# Patient Record
Sex: FEMALE | Race: WHITE | Employment: OTHER | URBAN - METROPOLITAN AREA
[De-identification: names, ages, dates, MRNs, and addresses within clinical notes are randomized per-mention and may not be internally consistent; named-entity substitution may affect disease eponyms.]

---

## 2017-02-21 ENCOUNTER — TELEPHONE (OUTPATIENT)
Dept: ORTHOPEDIC SURGERY | Age: 72
End: 2017-02-21

## 2017-04-11 ENCOUNTER — OFFICE VISIT (OUTPATIENT)
Dept: ORTHOPEDIC SURGERY | Age: 72
End: 2017-04-11

## 2017-04-11 VITALS — HEIGHT: 66 IN | BODY MASS INDEX: 41.77 KG/M2 | WEIGHT: 259.92 LBS

## 2017-04-11 DIAGNOSIS — M43.06 SPONDYLOLYSIS OF LUMBAR REGION: ICD-10-CM

## 2017-04-11 DIAGNOSIS — M25.562 CHRONIC PAIN OF BOTH KNEES: ICD-10-CM

## 2017-04-11 DIAGNOSIS — G89.29 CHRONIC PAIN OF BOTH KNEES: ICD-10-CM

## 2017-04-11 DIAGNOSIS — M22.42 CHONDROMALACIA OF BOTH PATELLAE: ICD-10-CM

## 2017-04-11 DIAGNOSIS — M17.0 PRIMARY OSTEOARTHRITIS OF BOTH KNEES: Primary | ICD-10-CM

## 2017-04-11 DIAGNOSIS — E11.42 DIABETIC POLYNEUROPATHY ASSOCIATED WITH TYPE 2 DIABETES MELLITUS (HCC): ICD-10-CM

## 2017-04-11 DIAGNOSIS — M25.561 CHRONIC PAIN OF BOTH KNEES: ICD-10-CM

## 2017-04-11 DIAGNOSIS — M22.41 CHONDROMALACIA OF BOTH PATELLAE: ICD-10-CM

## 2017-04-11 DIAGNOSIS — G89.29 CHRONIC BILATERAL LOW BACK PAIN WITHOUT SCIATICA: ICD-10-CM

## 2017-04-11 DIAGNOSIS — M54.50 CHRONIC BILATERAL LOW BACK PAIN WITHOUT SCIATICA: ICD-10-CM

## 2017-04-11 PROCEDURE — 99213 OFFICE O/P EST LOW 20 MIN: CPT | Performed by: FAMILY MEDICINE

## 2017-04-11 RX ORDER — METHYLPREDNISOLONE 4 MG/1
TABLET ORAL
Qty: 21 KIT | Refills: 0 | Status: SHIPPED | OUTPATIENT
Start: 2017-04-11 | End: 2019-08-01

## 2018-06-20 ENCOUNTER — ORTHOTIC/BRACE ENCOUNTER (OUTPATIENT)
Dept: ORTHOPEDIC SURGERY | Age: 73
End: 2018-06-20

## 2018-10-04 ENCOUNTER — OFFICE VISIT (OUTPATIENT)
Dept: ORTHOPEDIC SURGERY | Age: 73
End: 2018-10-04
Payer: MEDICARE

## 2018-10-04 ENCOUNTER — TELEPHONE (OUTPATIENT)
Dept: ORTHOPEDIC SURGERY | Age: 73
End: 2018-10-04

## 2018-10-04 VITALS — HEIGHT: 67 IN | BODY MASS INDEX: 39.24 KG/M2 | WEIGHT: 250 LBS

## 2018-10-04 DIAGNOSIS — M17.11 PRIMARY OSTEOARTHRITIS OF RIGHT KNEE: ICD-10-CM

## 2018-10-04 DIAGNOSIS — M25.561 CHRONIC PAIN OF RIGHT KNEE: Primary | ICD-10-CM

## 2018-10-04 DIAGNOSIS — M22.41 CHONDROMALACIA OF RIGHT PATELLA: ICD-10-CM

## 2018-10-04 DIAGNOSIS — G89.29 CHRONIC PAIN OF RIGHT KNEE: Primary | ICD-10-CM

## 2018-10-04 PROCEDURE — 4040F PNEUMOC VAC/ADMIN/RCVD: CPT | Performed by: FAMILY MEDICINE

## 2018-10-04 PROCEDURE — G8400 PT W/DXA NO RESULTS DOC: HCPCS | Performed by: FAMILY MEDICINE

## 2018-10-04 PROCEDURE — G8484 FLU IMMUNIZE NO ADMIN: HCPCS | Performed by: FAMILY MEDICINE

## 2018-10-04 PROCEDURE — 1101F PT FALLS ASSESS-DOCD LE1/YR: CPT | Performed by: FAMILY MEDICINE

## 2018-10-04 PROCEDURE — 1123F ACP DISCUSS/DSCN MKR DOCD: CPT | Performed by: FAMILY MEDICINE

## 2018-10-04 PROCEDURE — G8417 CALC BMI ABV UP PARAM F/U: HCPCS | Performed by: FAMILY MEDICINE

## 2018-10-04 PROCEDURE — G8427 DOCREV CUR MEDS BY ELIG CLIN: HCPCS | Performed by: FAMILY MEDICINE

## 2018-10-04 PROCEDURE — 3017F COLORECTAL CA SCREEN DOC REV: CPT | Performed by: FAMILY MEDICINE

## 2018-10-04 PROCEDURE — 1036F TOBACCO NON-USER: CPT | Performed by: FAMILY MEDICINE

## 2018-10-04 PROCEDURE — 99214 OFFICE O/P EST MOD 30 MIN: CPT | Performed by: FAMILY MEDICINE

## 2018-10-04 PROCEDURE — 1090F PRES/ABSN URINE INCON ASSESS: CPT | Performed by: FAMILY MEDICINE

## 2018-10-04 PROCEDURE — 20610 DRAIN/INJ JOINT/BURSA W/O US: CPT | Performed by: FAMILY MEDICINE

## 2018-10-04 NOTE — PROGRESS NOTES
symptoms have only improved minimally. She continues to have difficulty with squatting and getting up from a kneeling position. She is unable to exercise and reports no substantial pain with routine walking. It is simply with squatting and kneeling. She has been utilizing her topical diclofenac gel. Her sugars are under excellent control. She has had to modify some of her home exercises for VMO strengthening and hamstring flexibility. She is not really complaining of locking or catching. She was seen last in the office on 4/7/2016 for follow-up on her right knee severe chondromalacia patella with possible occult meniscus. She was continuing to have some difficulties however she could not afford an MRI or Visco supplementation and she states that her right knee symptoms are doing reasonably well with home exercises and use of her diclofenac gel. Her new complaint today is of ongoing pain to the anterior and lesser degree lateral portion of her left knee. She states that on 7/27/2016 she was sleeping and night and developed a charley horse and felt pain to the anterior lateral portion of her left knee. She was able to get back to sleep and the following day she awoke with limited ability to bear weight and swelling in her knee. She was having pain in the range of 7-8 out of 10 primarily anteriorly but also somewhat laterally. There is no history of trauma or fall. She did treat herself with icing consistently as well as her topical diclofenac. Her symptoms have improved substantially at least 80% but she does have ongoing pain with any attempted squatting and prolonged standing and climbing stairs. She is not complaining of active locking or catching. She is seen today for orthopedic and sports consultation. She was seen in the office on 8/4/2016 primarily for her left knee which was bothering her. She did receive a steroid injection states her left knee symptoms are doing very well.   Denies locking catching swelling or instability. Right knee was evaluated earlier this year and is known to have severe chondromalacia patella and concerns were raised earlier this year for a possible occult meniscus. Her symptoms did improve with conservative treatment. She states that on 10/10/2016 she was going down some stairs carrying laundry when she twisted awkwardly while wearing socks and sustained a weightbearing rotational injury involving her right knee. She is uncertain as to whether operative is a pop or crack of the she did have immediate pain. This is followed by swelling. Since that time she's had increasing pain about the anterior medial portion of her knee. She feels very stiff and is having achy pain at 4-5 out of 10 with sharper 8-9 out of 10 pain with attempted weightbearing and pivoting. Positional changes and getting up from a seated position or painful. She does have a history of renal disease and was advised not to take anti-inflammatory's. She has been taking Tylenol in her sugars are well controlled. She is also on Neurontin. She is having difficulty with sleeping. Due to her recurrent injury she is seen today for orthopedic and sports consultation with imaging. She has not had MRI imaging of his right knee and we had tried to get an MRI earlier this year but she could not afford it. She was last seen in the office on 10/13/2016 and was sent for an MRI of her knee given the ongoing pain to her right greater than left knee. Her MRI was obtained and did show evidence of grade 4 chondromalacia patella most prominent to the lateral patellofemoral facet. There was chronic tendinopathy to the quad tendon. As for now there was moderate chondral thinning to the medial compartment there is no evidence of displaced meniscus tear or ligamentous injury.   She continues to have pain with stiffness in motion loss and has been attempting to stretch and perform her home based exercises she

## 2018-10-05 ENCOUNTER — TELEPHONE (OUTPATIENT)
Dept: ORTHOPEDIC SURGERY | Age: 73
End: 2018-10-05

## 2018-10-11 ENCOUNTER — OFFICE VISIT (OUTPATIENT)
Dept: ORTHOPEDIC SURGERY | Age: 73
End: 2018-10-11
Payer: MEDICARE

## 2018-10-11 VITALS
WEIGHT: 250 LBS | BODY MASS INDEX: 39.24 KG/M2 | SYSTOLIC BLOOD PRESSURE: 121 MMHG | HEIGHT: 67 IN | DIASTOLIC BLOOD PRESSURE: 66 MMHG | HEART RATE: 80 BPM

## 2018-10-11 DIAGNOSIS — M25.561 CHRONIC PAIN OF RIGHT KNEE: ICD-10-CM

## 2018-10-11 DIAGNOSIS — M22.41 CHONDROMALACIA OF RIGHT PATELLA: ICD-10-CM

## 2018-10-11 DIAGNOSIS — G89.29 CHRONIC PAIN OF RIGHT KNEE: ICD-10-CM

## 2018-10-11 DIAGNOSIS — M17.11 PRIMARY OSTEOARTHRITIS OF RIGHT KNEE: Primary | ICD-10-CM

## 2018-10-11 PROCEDURE — G8417 CALC BMI ABV UP PARAM F/U: HCPCS | Performed by: FAMILY MEDICINE

## 2018-10-11 PROCEDURE — G8400 PT W/DXA NO RESULTS DOC: HCPCS | Performed by: FAMILY MEDICINE

## 2018-10-11 PROCEDURE — G8427 DOCREV CUR MEDS BY ELIG CLIN: HCPCS | Performed by: FAMILY MEDICINE

## 2018-10-11 PROCEDURE — 99213 OFFICE O/P EST LOW 20 MIN: CPT | Performed by: FAMILY MEDICINE

## 2018-10-11 PROCEDURE — G8484 FLU IMMUNIZE NO ADMIN: HCPCS | Performed by: FAMILY MEDICINE

## 2018-10-11 PROCEDURE — 4040F PNEUMOC VAC/ADMIN/RCVD: CPT | Performed by: FAMILY MEDICINE

## 2018-10-11 PROCEDURE — 1123F ACP DISCUSS/DSCN MKR DOCD: CPT | Performed by: FAMILY MEDICINE

## 2018-10-11 PROCEDURE — 3017F COLORECTAL CA SCREEN DOC REV: CPT | Performed by: FAMILY MEDICINE

## 2018-10-11 PROCEDURE — 1101F PT FALLS ASSESS-DOCD LE1/YR: CPT | Performed by: FAMILY MEDICINE

## 2018-10-11 PROCEDURE — 1090F PRES/ABSN URINE INCON ASSESS: CPT | Performed by: FAMILY MEDICINE

## 2018-10-11 PROCEDURE — 1036F TOBACCO NON-USER: CPT | Performed by: FAMILY MEDICINE

## 2018-10-11 PROCEDURE — 20610 DRAIN/INJ JOINT/BURSA W/O US: CPT | Performed by: FAMILY MEDICINE

## 2018-10-11 NOTE — PROGRESS NOTES
[address] [facility] 15mg/ML  [address]: [id number]  LOT #: [id number]  EXP: 2/29/20  LOCATION: Right Knee

## 2018-10-11 NOTE — PROGRESS NOTES
Chief Complaint  Knee Pain (Orthovisc #1 Right Knee)      Follow-up bilateral knee pain with known history of bilateral knee osteoarthritis and advanced chondromalacia status post completion of Visco supplementation 12/8/2016 with recurrent pain status post recent lumbar fusion 9/12/2018 by Dr. Susan Hernandez    History of Present Illness:  Yue Prakash is a 72 y.o. female is a very pleasant white female very nice patient of  Dr. Yennifer Calderon who is a reasonably well-controlled diabetic with her last hemoglobin A1c at 6.5 who has a history of renal disease and neuropathy as well who is being seen today in kind consultation from Dr. Yennifer Calderon for evaluation of a new orthopedic condition. Apparently several weeks ago she was kneeling down on her hands and knees to dust under an end table when she began to notice a very sharp pain to the retropatellar portion of her right knee. It is no definite history of injury or new activity. She is having pain in the range of 5-6 out of 10 really with only squatting and kneeling. When she is up and walking and exercising her knee is not causing her great difficulty although she has noticed patellofemoral crepitation. She's had some mild swelling. She cannot be on oral anti-inflammatories due to her history of renal disease. She was previously given topical diclofenac which she has been using. She is not really complaining of locking catching or instability. She does have very occasional pseudo-buckling but once again the majority of her pain is with squatting and kneeling. She reports little in the way of rest or night pain. She is seen today for orthopedic and sports consultation. She was seen in the office on 3/10/2016 and was started on conservative treatment for suspected right knee aggravation chondromalacia patella with mild underlying osteoarthritis with possible occult nonmechanical meniscus.   She presents back today stating that her symptoms have only swelling or instability. Right knee was evaluated earlier this year and is known to have severe chondromalacia patella and concerns were raised earlier this year for a possible occult meniscus. Her symptoms did improve with conservative treatment. She states that on 10/10/2016 she was going down some stairs carrying laundry when she twisted awkwardly while wearing socks and sustained a weightbearing rotational injury involving her right knee. She is uncertain as to whether operative is a pop or crack of the she did have immediate pain. This is followed by swelling. Since that time she's had increasing pain about the anterior medial portion of her knee. She feels very stiff and is having achy pain at 4-5 out of 10 with sharper 8-9 out of 10 pain with attempted weightbearing and pivoting. Positional changes and getting up from a seated position or painful. She does have a history of renal disease and was advised not to take anti-inflammatory's. She has been taking Tylenol in her sugars are well controlled. She is also on Neurontin. She is having difficulty with sleeping. Due to her recurrent injury she is seen today for orthopedic and sports consultation with imaging. She has not had MRI imaging of his right knee and we had tried to get an MRI earlier this year but she could not afford it. She was last seen in the office on 10/13/2016 and was sent for an MRI of her knee given the ongoing pain to her right greater than left knee. Her MRI was obtained and did show evidence of grade 4 chondromalacia patella most prominent to the lateral patellofemoral facet. There was chronic tendinopathy to the quad tendon. As for now there was moderate chondral thinning to the medial compartment there is no evidence of displaced meniscus tear or ligamentous injury.   She continues to have pain with stiffness in motion loss and has been attempting to stretch and perform her home based exercises she has been using her

## 2018-10-18 ENCOUNTER — OFFICE VISIT (OUTPATIENT)
Dept: ORTHOPEDIC SURGERY | Age: 73
End: 2018-10-18
Payer: MEDICARE

## 2018-10-18 VITALS
HEART RATE: 78 BPM | BODY MASS INDEX: 39.24 KG/M2 | WEIGHT: 250 LBS | DIASTOLIC BLOOD PRESSURE: 67 MMHG | SYSTOLIC BLOOD PRESSURE: 122 MMHG | HEIGHT: 67 IN

## 2018-10-18 DIAGNOSIS — M17.11 PRIMARY OSTEOARTHRITIS OF RIGHT KNEE: Primary | ICD-10-CM

## 2018-10-18 DIAGNOSIS — G89.29 CHRONIC PAIN OF RIGHT KNEE: ICD-10-CM

## 2018-10-18 DIAGNOSIS — M25.561 CHRONIC PAIN OF RIGHT KNEE: ICD-10-CM

## 2018-10-18 DIAGNOSIS — M22.41 CHONDROMALACIA OF RIGHT PATELLA: ICD-10-CM

## 2018-10-18 PROCEDURE — 20610 DRAIN/INJ JOINT/BURSA W/O US: CPT | Performed by: FAMILY MEDICINE

## 2018-10-18 PROCEDURE — 99999 PR OFFICE/OUTPT VISIT,PROCEDURE ONLY: CPT | Performed by: FAMILY MEDICINE

## 2018-10-25 ENCOUNTER — OFFICE VISIT (OUTPATIENT)
Dept: ORTHOPEDIC SURGERY | Age: 73
End: 2018-10-25
Payer: MEDICARE

## 2018-10-25 VITALS
HEART RATE: 76 BPM | WEIGHT: 250 LBS | HEIGHT: 67 IN | BODY MASS INDEX: 39.24 KG/M2 | DIASTOLIC BLOOD PRESSURE: 66 MMHG | SYSTOLIC BLOOD PRESSURE: 124 MMHG

## 2018-10-25 DIAGNOSIS — M22.41 CHONDROMALACIA OF RIGHT PATELLA: ICD-10-CM

## 2018-10-25 DIAGNOSIS — G89.29 CHRONIC PAIN OF RIGHT KNEE: ICD-10-CM

## 2018-10-25 DIAGNOSIS — M17.11 PRIMARY OSTEOARTHRITIS OF RIGHT KNEE: Primary | ICD-10-CM

## 2018-10-25 DIAGNOSIS — M25.561 CHRONIC PAIN OF RIGHT KNEE: ICD-10-CM

## 2018-10-25 PROCEDURE — 99999 PR OFFICE/OUTPT VISIT,PROCEDURE ONLY: CPT | Performed by: FAMILY MEDICINE

## 2018-10-25 PROCEDURE — 20610 DRAIN/INJ JOINT/BURSA W/O US: CPT | Performed by: FAMILY MEDICINE

## 2019-02-28 DIAGNOSIS — M25.561 CHRONIC PAIN OF RIGHT KNEE: ICD-10-CM

## 2019-02-28 DIAGNOSIS — M22.41 CHONDROMALACIA OF RIGHT PATELLA: ICD-10-CM

## 2019-02-28 DIAGNOSIS — M17.11 PRIMARY OSTEOARTHRITIS OF RIGHT KNEE: Primary | ICD-10-CM

## 2019-02-28 DIAGNOSIS — G89.29 CHRONIC PAIN OF RIGHT KNEE: ICD-10-CM

## 2019-06-21 DIAGNOSIS — M25.561 CHRONIC PAIN OF RIGHT KNEE: ICD-10-CM

## 2019-06-21 DIAGNOSIS — G89.29 CHRONIC PAIN OF RIGHT KNEE: ICD-10-CM

## 2019-06-21 DIAGNOSIS — M17.11 PRIMARY OSTEOARTHRITIS OF RIGHT KNEE: ICD-10-CM

## 2019-06-21 DIAGNOSIS — M22.41 CHONDROMALACIA OF RIGHT PATELLA: ICD-10-CM

## 2019-08-01 ENCOUNTER — APPOINTMENT (OUTPATIENT)
Dept: GENERAL RADIOLOGY | Age: 74
DRG: 871 | End: 2019-08-01
Payer: MEDICARE

## 2019-08-01 ENCOUNTER — HOSPITAL ENCOUNTER (INPATIENT)
Age: 74
LOS: 3 days | Discharge: HOME | DRG: 871 | End: 2019-08-04
Attending: EMERGENCY MEDICINE | Admitting: INTERNAL MEDICINE
Payer: MEDICARE

## 2019-08-01 DIAGNOSIS — N39.0 URINARY TRACT INFECTION WITHOUT HEMATURIA, SITE UNSPECIFIED: ICD-10-CM

## 2019-08-01 DIAGNOSIS — R65.20 SEVERE SEPSIS (HCC): Primary | ICD-10-CM

## 2019-08-01 DIAGNOSIS — A41.9 SEVERE SEPSIS (HCC): Primary | ICD-10-CM

## 2019-08-01 DIAGNOSIS — N17.9 AKI (ACUTE KIDNEY INJURY) (HCC): ICD-10-CM

## 2019-08-01 DIAGNOSIS — M17.0 PRIMARY OSTEOARTHRITIS OF BOTH KNEES: ICD-10-CM

## 2019-08-01 PROBLEM — R65.21 SEPTIC SHOCK (HCC): Status: ACTIVE | Noted: 2019-08-01

## 2019-08-01 PROBLEM — R57.9 SHOCK (HCC): Status: ACTIVE | Noted: 2019-08-01

## 2019-08-01 LAB
ANION GAP SERPL CALCULATED.3IONS-SCNC: 12 MMOL/L (ref 3–16)
ANION GAP SERPL CALCULATED.3IONS-SCNC: 16 MMOL/L (ref 3–16)
BACTERIA: ABNORMAL /HPF
BASOPHILS ABSOLUTE: 0 K/UL (ref 0–0.2)
BASOPHILS ABSOLUTE: 0 K/UL (ref 0–0.2)
BASOPHILS RELATIVE PERCENT: 0.2 %
BASOPHILS RELATIVE PERCENT: 0.2 %
BILIRUBIN URINE: NEGATIVE
BLOOD, URINE: ABNORMAL
BUN BLDV-MCNC: 60 MG/DL (ref 7–20)
BUN BLDV-MCNC: 64 MG/DL (ref 7–20)
CALCIUM SERPL-MCNC: 8.8 MG/DL (ref 8.3–10.6)
CALCIUM SERPL-MCNC: 9.7 MG/DL (ref 8.3–10.6)
CHLORIDE BLD-SCNC: 104 MMOL/L (ref 99–110)
CHLORIDE BLD-SCNC: 105 MMOL/L (ref 99–110)
CLARITY: ABNORMAL
CO2: 17 MMOL/L (ref 21–32)
CO2: 18 MMOL/L (ref 21–32)
COLOR: YELLOW
CREAT SERPL-MCNC: 3.4 MG/DL (ref 0.6–1.2)
CREAT SERPL-MCNC: 3.8 MG/DL (ref 0.6–1.2)
EKG ATRIAL RATE: 107 BPM
EKG DIAGNOSIS: NORMAL
EKG P AXIS: 60 DEGREES
EKG P-R INTERVAL: 144 MS
EKG Q-T INTERVAL: 326 MS
EKG QRS DURATION: 74 MS
EKG QTC CALCULATION (BAZETT): 435 MS
EKG R AXIS: -5 DEGREES
EKG T AXIS: 34 DEGREES
EKG VENTRICULAR RATE: 107 BPM
EOSINOPHILS ABSOLUTE: 0 K/UL (ref 0–0.6)
EOSINOPHILS ABSOLUTE: 0 K/UL (ref 0–0.6)
EOSINOPHILS RELATIVE PERCENT: 0.1 %
EOSINOPHILS RELATIVE PERCENT: 0.1 %
EPITHELIAL CELLS, UA: 0 /HPF (ref 0–5)
GFR AFRICAN AMERICAN: 14
GFR AFRICAN AMERICAN: 16
GFR NON-AFRICAN AMERICAN: 12
GFR NON-AFRICAN AMERICAN: 13
GLUCOSE BLD-MCNC: 124 MG/DL (ref 70–99)
GLUCOSE BLD-MCNC: 156 MG/DL (ref 70–99)
GLUCOSE BLD-MCNC: 254 MG/DL (ref 70–99)
GLUCOSE URINE: NEGATIVE MG/DL
HCT VFR BLD CALC: 27.9 % (ref 36–48)
HCT VFR BLD CALC: 33.4 % (ref 36–48)
HEMOGLOBIN: 11.2 G/DL (ref 12–16)
HEMOGLOBIN: 9.3 G/DL (ref 12–16)
HYALINE CASTS: 1 /LPF (ref 0–8)
KETONES, URINE: NEGATIVE MG/DL
LACTIC ACID, SEPSIS: 0.8 MMOL/L (ref 0.4–1.9)
LACTIC ACID, SEPSIS: 1.3 MMOL/L (ref 0.4–1.9)
LEUKOCYTE ESTERASE, URINE: ABNORMAL
LYMPHOCYTES ABSOLUTE: 0.3 K/UL (ref 1–5.1)
LYMPHOCYTES ABSOLUTE: 1.2 K/UL (ref 1–5.1)
LYMPHOCYTES RELATIVE PERCENT: 12.7 %
LYMPHOCYTES RELATIVE PERCENT: 3.9 %
MAGNESIUM: 2.2 MG/DL (ref 1.8–2.4)
MCH RBC QN AUTO: 31.3 PG (ref 26–34)
MCH RBC QN AUTO: 31.3 PG (ref 26–34)
MCHC RBC AUTO-ENTMCNC: 33.3 G/DL (ref 31–36)
MCHC RBC AUTO-ENTMCNC: 33.6 G/DL (ref 31–36)
MCV RBC AUTO: 93.1 FL (ref 80–100)
MCV RBC AUTO: 94 FL (ref 80–100)
MICROSCOPIC EXAMINATION: YES
MONOCYTES ABSOLUTE: 0.5 K/UL (ref 0–1.3)
MONOCYTES ABSOLUTE: 0.9 K/UL (ref 0–1.3)
MONOCYTES RELATIVE PERCENT: 6.2 %
MONOCYTES RELATIVE PERCENT: 9.5 %
NEUTROPHILS ABSOLUTE: 7.2 K/UL (ref 1.7–7.7)
NEUTROPHILS ABSOLUTE: 7.3 K/UL (ref 1.7–7.7)
NEUTROPHILS RELATIVE PERCENT: 77.5 %
NEUTROPHILS RELATIVE PERCENT: 89.6 %
NITRITE, URINE: NEGATIVE
PDW BLD-RTO: 16.6 % (ref 12.4–15.4)
PDW BLD-RTO: 16.8 % (ref 12.4–15.4)
PERFORMED ON: ABNORMAL
PH UA: 5 (ref 5–8)
PHOSPHORUS: 3.9 MG/DL (ref 2.5–4.9)
PLATELET # BLD: 196 K/UL (ref 135–450)
PLATELET # BLD: 214 K/UL (ref 135–450)
PMV BLD AUTO: 8.8 FL (ref 5–10.5)
PMV BLD AUTO: 8.8 FL (ref 5–10.5)
POTASSIUM REFLEX MAGNESIUM: 5.1 MMOL/L (ref 3.5–5.1)
POTASSIUM SERPL-SCNC: 5 MMOL/L (ref 3.5–5.1)
PRO-BNP: 819 PG/ML (ref 0–124)
PROTEIN UA: 30 MG/DL
RBC # BLD: 2.97 M/UL (ref 4–5.2)
RBC # BLD: 3.59 M/UL (ref 4–5.2)
RBC UA: 1 /HPF (ref 0–4)
SODIUM BLD-SCNC: 135 MMOL/L (ref 136–145)
SODIUM BLD-SCNC: 137 MMOL/L (ref 136–145)
SPECIFIC GRAVITY UA: 1.02 (ref 1–1.03)
TROPONIN: 0.03 NG/ML
URINE REFLEX TO CULTURE: YES
URINE TYPE: ABNORMAL
UROBILINOGEN, URINE: 0.2 E.U./DL
WBC # BLD: 8 K/UL (ref 4–11)
WBC # BLD: 9.5 K/UL (ref 4–11)
WBC UA: 37 /HPF (ref 0–5)

## 2019-08-01 PROCEDURE — 99291 CRITICAL CARE FIRST HOUR: CPT | Performed by: INTERNAL MEDICINE

## 2019-08-01 PROCEDURE — 71045 X-RAY EXAM CHEST 1 VIEW: CPT

## 2019-08-01 PROCEDURE — 85025 COMPLETE CBC W/AUTO DIFF WBC: CPT

## 2019-08-01 PROCEDURE — 94761 N-INVAS EAR/PLS OXIMETRY MLT: CPT

## 2019-08-01 PROCEDURE — 2580000003 HC RX 258: Performed by: NURSE PRACTITIONER

## 2019-08-01 PROCEDURE — 93010 ELECTROCARDIOGRAM REPORT: CPT | Performed by: INTERNAL MEDICINE

## 2019-08-01 PROCEDURE — 83735 ASSAY OF MAGNESIUM: CPT

## 2019-08-01 PROCEDURE — 87641 MR-STAPH DNA AMP PROBE: CPT

## 2019-08-01 PROCEDURE — 2500000003 HC RX 250 WO HCPCS: Performed by: INTERNAL MEDICINE

## 2019-08-01 PROCEDURE — 84484 ASSAY OF TROPONIN QUANT: CPT

## 2019-08-01 PROCEDURE — 6370000000 HC RX 637 (ALT 250 FOR IP): Performed by: INTERNAL MEDICINE

## 2019-08-01 PROCEDURE — 2580000003 HC RX 258: Performed by: EMERGENCY MEDICINE

## 2019-08-01 PROCEDURE — 96365 THER/PROPH/DIAG IV INF INIT: CPT

## 2019-08-01 PROCEDURE — 96361 HYDRATE IV INFUSION ADD-ON: CPT

## 2019-08-01 PROCEDURE — 83880 ASSAY OF NATRIURETIC PEPTIDE: CPT

## 2019-08-01 PROCEDURE — 84100 ASSAY OF PHOSPHORUS: CPT

## 2019-08-01 PROCEDURE — 71046 X-RAY EXAM CHEST 2 VIEWS: CPT

## 2019-08-01 PROCEDURE — 4500000026 HC ED CRITICAL CARE PROCEDURE

## 2019-08-01 PROCEDURE — 2000000000 HC ICU R&B

## 2019-08-01 PROCEDURE — 6370000000 HC RX 637 (ALT 250 FOR IP): Performed by: EMERGENCY MEDICINE

## 2019-08-01 PROCEDURE — 83605 ASSAY OF LACTIC ACID: CPT

## 2019-08-01 PROCEDURE — 51702 INSERT TEMP BLADDER CATH: CPT | Performed by: NURSE PRACTITIONER

## 2019-08-01 PROCEDURE — 51702 INSERT TEMP BLADDER CATH: CPT

## 2019-08-01 PROCEDURE — 81001 URINALYSIS AUTO W/SCOPE: CPT

## 2019-08-01 PROCEDURE — 96367 TX/PROPH/DG ADDL SEQ IV INF: CPT

## 2019-08-01 PROCEDURE — 87086 URINE CULTURE/COLONY COUNT: CPT

## 2019-08-01 PROCEDURE — 99291 CRITICAL CARE FIRST HOUR: CPT

## 2019-08-01 PROCEDURE — 80048 BASIC METABOLIC PNL TOTAL CA: CPT

## 2019-08-01 PROCEDURE — 2580000003 HC RX 258: Performed by: INTERNAL MEDICINE

## 2019-08-01 PROCEDURE — 93005 ELECTROCARDIOGRAM TRACING: CPT | Performed by: EMERGENCY MEDICINE

## 2019-08-01 PROCEDURE — 87040 BLOOD CULTURE FOR BACTERIA: CPT

## 2019-08-01 PROCEDURE — 36556 INSERT NON-TUNNEL CV CATH: CPT | Performed by: NURSE PRACTITIONER

## 2019-08-01 PROCEDURE — 6360000002 HC RX W HCPCS: Performed by: NURSE PRACTITIONER

## 2019-08-01 PROCEDURE — 6360000002 HC RX W HCPCS: Performed by: EMERGENCY MEDICINE

## 2019-08-01 RX ORDER — SODIUM CHLORIDE 9 MG/ML
INJECTION, SOLUTION INTRAVENOUS CONTINUOUS
Status: DISCONTINUED | OUTPATIENT
Start: 2019-08-01 | End: 2019-08-03

## 2019-08-01 RX ORDER — ACETAMINOPHEN 325 MG/1
650 TABLET ORAL ONCE
Status: COMPLETED | OUTPATIENT
Start: 2019-08-01 | End: 2019-08-01

## 2019-08-01 RX ORDER — HEPARIN SODIUM 5000 [USP'U]/ML
5000 INJECTION, SOLUTION INTRAVENOUS; SUBCUTANEOUS EVERY 8 HOURS SCHEDULED
Status: DISCONTINUED | OUTPATIENT
Start: 2019-08-01 | End: 2019-08-04 | Stop reason: HOSPADM

## 2019-08-01 RX ORDER — 0.9 % SODIUM CHLORIDE 0.9 %
30 INTRAVENOUS SOLUTION INTRAVENOUS ONCE
Status: COMPLETED | OUTPATIENT
Start: 2019-08-01 | End: 2019-08-01

## 2019-08-01 RX ORDER — GABAPENTIN 300 MG/1
600 CAPSULE ORAL NIGHTLY
Status: DISCONTINUED | OUTPATIENT
Start: 2019-08-01 | End: 2019-08-01

## 2019-08-01 RX ORDER — INSULIN ASPART 100 [IU]/ML
12 INJECTION, SOLUTION INTRAVENOUS; SUBCUTANEOUS
COMMUNITY
Start: 2019-05-27

## 2019-08-01 RX ORDER — SODIUM CHLORIDE 0.9 % (FLUSH) 0.9 %
10 SYRINGE (ML) INJECTION PRN
Status: DISCONTINUED | OUTPATIENT
Start: 2019-08-01 | End: 2019-08-04 | Stop reason: HOSPADM

## 2019-08-01 RX ORDER — GABAPENTIN 100 MG/1
200 CAPSULE ORAL NIGHTLY
Status: DISCONTINUED | OUTPATIENT
Start: 2019-08-01 | End: 2019-08-04 | Stop reason: HOSPADM

## 2019-08-01 RX ORDER — SODIUM CHLORIDE 0.9 % (FLUSH) 0.9 %
10 SYRINGE (ML) INJECTION EVERY 12 HOURS SCHEDULED
Status: DISCONTINUED | OUTPATIENT
Start: 2019-08-01 | End: 2019-08-04 | Stop reason: HOSPADM

## 2019-08-01 RX ORDER — INSULIN GLARGINE 100 [IU]/ML
30 INJECTION, SOLUTION SUBCUTANEOUS 2 TIMES DAILY
Status: DISCONTINUED | OUTPATIENT
Start: 2019-08-01 | End: 2019-08-04 | Stop reason: HOSPADM

## 2019-08-01 RX ORDER — LISINOPRIL 5 MG/1
5 TABLET ORAL DAILY
Status: DISCONTINUED | OUTPATIENT
Start: 2019-08-01 | End: 2019-08-01

## 2019-08-01 RX ORDER — ATORVASTATIN CALCIUM 40 MG/1
40 TABLET, FILM COATED ORAL DAILY
COMMUNITY
Start: 2019-07-09 | End: 2020-07-21

## 2019-08-01 RX ORDER — 0.9 % SODIUM CHLORIDE 0.9 %
1000 INTRAVENOUS SOLUTION INTRAVENOUS ONCE
Status: COMPLETED | OUTPATIENT
Start: 2019-08-01 | End: 2019-08-01

## 2019-08-01 RX ADMIN — CEFEPIME HYDROCHLORIDE 2 G: 2 INJECTION, POWDER, FOR SOLUTION INTRAVENOUS at 13:04

## 2019-08-01 RX ADMIN — VANCOMYCIN HYDROCHLORIDE 1250 MG: 10 INJECTION, POWDER, LYOPHILIZED, FOR SOLUTION INTRAVENOUS at 17:46

## 2019-08-01 RX ADMIN — ACETAMINOPHEN 650 MG: 325 TABLET ORAL at 12:40

## 2019-08-01 RX ADMIN — SODIUM CHLORIDE 3501 ML: 9 INJECTION, SOLUTION INTRAVENOUS at 12:39

## 2019-08-01 RX ADMIN — INSULIN GLARGINE 30 UNITS: 100 INJECTION, SOLUTION SUBCUTANEOUS at 21:26

## 2019-08-01 RX ADMIN — Medication 5 MCG/MIN: at 15:40

## 2019-08-01 RX ADMIN — SODIUM CHLORIDE, PRESERVATIVE FREE 10 ML: 5 INJECTION INTRAVENOUS at 21:26

## 2019-08-01 RX ADMIN — GABAPENTIN 200 MG: 100 CAPSULE ORAL at 21:26

## 2019-08-01 RX ADMIN — SODIUM CHLORIDE: 9 INJECTION, SOLUTION INTRAVENOUS at 16:59

## 2019-08-01 RX ADMIN — SODIUM CHLORIDE 1000 ML: 9 INJECTION, SOLUTION INTRAVENOUS at 17:27

## 2019-08-01 RX ADMIN — HEPARIN SODIUM 5000 UNITS: 5000 INJECTION INTRAVENOUS; SUBCUTANEOUS at 21:26

## 2019-08-01 ASSESSMENT — PAIN SCALES - GENERAL
PAINLEVEL_OUTOF10: 0

## 2019-08-01 NOTE — PROGRESS NOTES
4 Eyes Skin Assessment     The patient is being assess for  Admission    I agree that 2 RN's have performed a thorough Head to Toe Skin Assessment on the patient. ALL assessment sites listed below have been assessed. Areas assessed by both nurses:   [x]   Head, Face, and Ears   [x]   Shoulders, Back, and Chest  [x]   Arms, Elbows, and Hands   [x]   Coccyx, Sacrum, and IschIum  [x]   Legs, Feet, and Heels        Does the Patient have Skin Breakdown?   No         Oscar Prevention initiated:  Yes   Wound Care Orders initiated:  No      [facility] nurse consulted for Pressure Injury (Stage 3,4, Unstageable, DTI, NWPT, and Complex wounds), New and Established Ostomies:  No      Nurse 1 eSignature: Electronically signed by Purnima Manuel RN on 8/1/19 at 5:06 PM    **SHARE this note so that the co-signing nurse is able to place an eSignature**    Nurse 2 eSignature: Electronically signed by Gege Beasley RN on 8/1/19 at 6:57 PM

## 2019-08-01 NOTE — ED PROVIDER NOTES
Transportation needs:     Medical: Not on file     Non-medical: Not on file   Tobacco Use    Smoking status: Never Smoker    Smokeless tobacco: Never Used   Substance and Sexual Activity    Alcohol use: No     Alcohol/week: 0.0 standard drinks    Drug use: No    Sexual activity: Not Currently   Lifestyle    Physical activity:     Days per week: Not on file     Minutes per session: Not on file    Stress: Not on file   Relationships    Social connections:     Talks on phone: Not on file     Gets together: Not on file     Attends Scientologist service: Not on file     Active member of club or organization: Not on file     Attends meetings of clubs or organizations: Not on file     Relationship status: Not on file    Intimate partner violence:     Fear of current or ex partner: Not on file     Emotionally abused: Not on file     Physically abused: Not on file     Forced sexual activity: Not on file   Other Topics Concern    Not on file   Social History Narrative    Not on file     No current facility-administered medications for this encounter. Current Outpatient Medications   Medication Sig Dispense Refill    atorvastatin (LIPITOR) 40 MG tablet Take 40 mg by mouth daily      NOVOLOG FLEXPEN 100 UNIT/ML injection pen Inject 3-10 Units into the skin 3 times daily (with meals)      diclofenac (PENNSAID) 2 % SOLN Place 40 g onto the skin 2 times daily 2 pumps      lisinopril (PRINIVIL;ZESTRIL) 5 MG tablet Take 5 mg by mouth daily       LANTUS SOLOSTAR 100 UNIT/ML injection pen Inject 76 Units into the skin 2 times daily       gabapentin (NEURONTIN) 300 MG capsule Take 600 mg by mouth nightly.        fenofibrate 160 MG tablet Take 160 mg by mouth daily       BD PEN NEEDLE FANNIE U/F 32G X 4 MM MISC        Allergies   Allergen Reactions    Ace Inhibitors      Other reaction(s): Kidney Problem   Ok to take small dose of lisinopril    Oxycodone Itching       REVIEW OF SYSTEMS  10 systems reviewed, pertinent

## 2019-08-01 NOTE — ED NOTES
Dr. Kelle Kang and Dr. Alok Rivers made aware of low BP     EDMD at bedside      Orlin Ahumada, RN  08/01/19 0325

## 2019-08-01 NOTE — ED NOTES
Central line place and pt tolerated well pt awaiting chest xray to verify placement.       Hi Martini, RN  08/01/19 1100

## 2019-08-01 NOTE — ED NOTES
Pharmacy Medication Reconciliation Note     List of medications patient is currently taking is complete. Allergies:  Ace inhibitors and Oxycodone    Source of information:   1. Dispensing record  2. patient    Notes regarding home medications:   1. Reports taking her AM medications today, including both insulin types. 2. She is unsure why she has a reported allergy to ACE inhibitors. Believes the higher doses were problematic and now is just on 5 mg of lisinopril because of that.      Denies taking any other OTC or herbal medications    Miley Jain PharmD, BCPS  8/1/2019  2:41 PM

## 2019-08-01 NOTE — H&P
SOLOSTAR 100 UNIT/ML injection pen Inject 76 Units into the skin 2 times daily  12/11/14  Yes Historical Provider, MD   gabapentin (NEURONTIN) 300 MG capsule Take 600 mg by mouth nightly. 3/4/15  Yes Historical Provider, MD   fenofibrate 160 MG tablet Take 160 mg by mouth daily  2/12/15  Yes Historical Provider, MD   BD PEN NEEDLE FANNIE U/F 32G X 4 MM MISC  3/4/15   Historical Provider, MD       Allergies:  Ace inhibitors and Oxycodone    Social History:      The patient currently lives with family    TOBACCO:   reports that she has never smoked. She has never used smokeless tobacco.  ETOH:   reports that she does not drink alcohol. Family History:       Reviewed in detail and negative for DM, CAD, Cancer, CVA. Positive as follows:    History reviewed. No pertinent family history. REVIEW OF SYSTEMS:   Pertinent positives as noted in the HPI. All other systems reviewed and negative. PHYSICAL EXAM PERFORMED:    BP (!) 116/51   Pulse 76   Temp 98.1 °F (36.7 °C) (Oral)   Resp 16   Ht 5' 7\" (1.702 m)   Wt 260 lb 12.9 oz (118.3 kg)   SpO2 99%   BMI 40.85 kg/m²     General appearance:  No apparent distress, appears stated age and cooperative. HEENT:  Normal cephalic, atraumatic without obvious deformity. Pupils equal, round, and reactive to light. Extra ocular muscles intact. Conjunctivae/corneas clear. Oral cavity dry tacky mucous membranes  Neck: Supple, with full range of motion. No jugular venous distention. Trachea midline. Respiratory:  Normal respiratory effort. Clear to auscultation, bilaterally without Rales/Wheezes/Rhonchi. Cardiovascular:  Regular rate and rhythm with normal S1/S2 without murmurs, rubs or gallops. Abdomen: Soft, non-tender, non-distended with normal bowel sounds. Musculoskeletal:  No clubbing, cyanosis or edema bilaterally. Full range of motion without deformity. Skin: Skin color, texture, turgor normal.  No rashes or lesions.   Neurologic:  Neurovascularly intact without any focal sensory/motor deficits. Cranial nerves: II-XII intact, grossly non-focal.  Psychiatric:  Alert and oriented, thought content appropriate, normal insight  Capillary Refill: Brisk,< 3 seconds   Peripheral Pulses: +2 palpable, equal bilaterally       Labs:     Recent Labs     08/01/19  1207   WBC 8.0   HGB 11.2*   HCT 33.4*        Recent Labs     08/01/19  1208      K 5.1      CO2 17*   BUN 64*   CREATININE 3.8*   CALCIUM 9.7     No results for input(s): AST, ALT, BILIDIR, BILITOT, ALKPHOS in the last 72 hours. No results for input(s): INR in the last 72 hours. Recent Labs     08/01/19  1208   TROPONINI 0.03*       Urinalysis:      Lab Results   Component Value Date    NITRU Negative 08/01/2019    WBCUA 37 08/01/2019    BACTERIA RARE 08/01/2019    RBCUA 1 08/01/2019    BLOODU MODERATE 08/01/2019    SPECGRAV 1.017 08/01/2019    GLUCOSEU Negative 08/01/2019       Radiology:     XR CHEST PORTABLE   Final Result   Placement of right IJ central venous catheter with tip in the proximal SVC. No pneumothorax. Stable mild pulmonary vascular congestion without overt pulmonary edema. Stable cardiomegaly. XR CHEST STANDARD (2 VW)   Final Result   No acute process. ASSESSMENT:    Septic shock-possibly related to urinary source or other unidentified source. Fevers chills and hypotension seem to suggest infectious etiology    Plan  Continue broad-spectrum antibiotics cefepime  Await culture results of blood and urine  Serial lactates  Renew Levophed for now titrate for MAP greater than 60  Continue fluid resuscitation    Acute on chronic renal failure-patient's creatinine is elevated compared to baseline. Last creatinine 6/2019 was 1.6.   Patient sees Dr. Abi Orr from [facility]  Case discussed with Dr. Abi Orr  ACE inhibitor held diuretics on hold  He will see the patient in the morning  Continue to monitor urine output and avoid nephrotoxins    Diabetes mellitus type 2  Continue correction insulin per sliding scale            DVT Prophylaxis: heparin 5000 tid  Diet: DIET CARB CONTROL;  Code Status: Full Code        Dispo - 2-3 d       Louann White MD    Thank you Song Borrego MD for the opportunity to be involved in this patient's care. If you have any questions or concerns please feel free to contact me at [phone].

## 2019-08-02 LAB
ANION GAP SERPL CALCULATED.3IONS-SCNC: 13 MMOL/L (ref 3–16)
BASOPHILS ABSOLUTE: 0 K/UL (ref 0–0.2)
BASOPHILS RELATIVE PERCENT: 0.3 %
BUN BLDV-MCNC: 48 MG/DL (ref 7–20)
CALCIUM SERPL-MCNC: 8.8 MG/DL (ref 8.3–10.6)
CHLORIDE BLD-SCNC: 109 MMOL/L (ref 99–110)
CO2: 18 MMOL/L (ref 21–32)
CREAT SERPL-MCNC: 2.9 MG/DL (ref 0.6–1.2)
CREATININE URINE: 51.4 MG/DL (ref 28–259)
EOSINOPHILS ABSOLUTE: 0 K/UL (ref 0–0.6)
EOSINOPHILS RELATIVE PERCENT: 0 %
GFR AFRICAN AMERICAN: 19
GFR NON-AFRICAN AMERICAN: 16
GLUCOSE BLD-MCNC: 106 MG/DL (ref 70–99)
GLUCOSE BLD-MCNC: 110 MG/DL (ref 70–99)
GLUCOSE BLD-MCNC: 134 MG/DL (ref 70–99)
GLUCOSE BLD-MCNC: 151 MG/DL (ref 70–99)
GLUCOSE BLD-MCNC: 161 MG/DL (ref 70–99)
HCT VFR BLD CALC: 28.7 % (ref 36–48)
HEMOGLOBIN: 9.4 G/DL (ref 12–16)
LV EF: 58 %
LVEF MODALITY: NORMAL
LYMPHOCYTES ABSOLUTE: 1.4 K/UL (ref 1–5.1)
LYMPHOCYTES RELATIVE PERCENT: 14.1 %
MAGNESIUM: 2.1 MG/DL (ref 1.8–2.4)
MCH RBC QN AUTO: 30.4 PG (ref 26–34)
MCHC RBC AUTO-ENTMCNC: 32.6 G/DL (ref 31–36)
MCV RBC AUTO: 93.2 FL (ref 80–100)
MONOCYTES ABSOLUTE: 1.2 K/UL (ref 0–1.3)
MONOCYTES RELATIVE PERCENT: 12 %
MRSA SCREEN RT-PCR: NORMAL
NEUTROPHILS ABSOLUTE: 7.1 K/UL (ref 1.7–7.7)
NEUTROPHILS RELATIVE PERCENT: 73.6 %
PDW BLD-RTO: 16.6 % (ref 12.4–15.4)
PERFORMED ON: ABNORMAL
PHOSPHORUS: 3.1 MG/DL (ref 2.5–4.9)
PLATELET # BLD: 208 K/UL (ref 135–450)
PMV BLD AUTO: 9.1 FL (ref 5–10.5)
POTASSIUM SERPL-SCNC: 4.9 MMOL/L (ref 3.5–5.1)
RBC # BLD: 3.08 M/UL (ref 4–5.2)
SODIUM BLD-SCNC: 140 MMOL/L (ref 136–145)
SODIUM URINE: 126 MMOL/L
UREA NITROGEN, UR: 411.9 MG/DL (ref 800–1666)
URINE CULTURE, ROUTINE: NORMAL
WBC # BLD: 9.6 K/UL (ref 4–11)

## 2019-08-02 PROCEDURE — 2580000003 HC RX 258: Performed by: INTERNAL MEDICINE

## 2019-08-02 PROCEDURE — 97165 OT EVAL LOW COMPLEX 30 MIN: CPT

## 2019-08-02 PROCEDURE — 84540 ASSAY OF URINE/UREA-N: CPT

## 2019-08-02 PROCEDURE — 84100 ASSAY OF PHOSPHORUS: CPT

## 2019-08-02 PROCEDURE — 84300 ASSAY OF URINE SODIUM: CPT

## 2019-08-02 PROCEDURE — 83735 ASSAY OF MAGNESIUM: CPT

## 2019-08-02 PROCEDURE — 99291 CRITICAL CARE FIRST HOUR: CPT | Performed by: INTERNAL MEDICINE

## 2019-08-02 PROCEDURE — 1200000000 HC SEMI PRIVATE

## 2019-08-02 PROCEDURE — 82570 ASSAY OF URINE CREATININE: CPT

## 2019-08-02 PROCEDURE — 6370000000 HC RX 637 (ALT 250 FOR IP): Performed by: INTERNAL MEDICINE

## 2019-08-02 PROCEDURE — 85025 COMPLETE CBC W/AUTO DIFF WBC: CPT

## 2019-08-02 PROCEDURE — 6360000002 HC RX W HCPCS: Performed by: NURSE PRACTITIONER

## 2019-08-02 PROCEDURE — 94761 N-INVAS EAR/PLS OXIMETRY MLT: CPT

## 2019-08-02 PROCEDURE — 80048 BASIC METABOLIC PNL TOTAL CA: CPT

## 2019-08-02 PROCEDURE — 97530 THERAPEUTIC ACTIVITIES: CPT

## 2019-08-02 PROCEDURE — 93306 TTE W/DOPPLER COMPLETE: CPT

## 2019-08-02 PROCEDURE — 6360000002 HC RX W HCPCS: Performed by: INTERNAL MEDICINE

## 2019-08-02 RX ORDER — NICOTINE POLACRILEX 4 MG
15 LOZENGE BUCCAL PRN
Status: DISCONTINUED | OUTPATIENT
Start: 2019-08-02 | End: 2019-08-02

## 2019-08-02 RX ORDER — DEXTROSE MONOHYDRATE 25 G/50ML
12.5 INJECTION, SOLUTION INTRAVENOUS PRN
Status: DISCONTINUED | OUTPATIENT
Start: 2019-08-02 | End: 2019-08-02

## 2019-08-02 RX ORDER — DEXTROSE MONOHYDRATE 50 MG/ML
100 INJECTION, SOLUTION INTRAVENOUS PRN
Status: DISCONTINUED | OUTPATIENT
Start: 2019-08-02 | End: 2019-08-02

## 2019-08-02 RX ORDER — NICOTINE POLACRILEX 4 MG
15 LOZENGE BUCCAL PRN
Status: DISCONTINUED | OUTPATIENT
Start: 2019-08-02 | End: 2019-08-04 | Stop reason: HOSPADM

## 2019-08-02 RX ORDER — DEXTROSE MONOHYDRATE 25 G/50ML
12.5 INJECTION, SOLUTION INTRAVENOUS PRN
Status: DISCONTINUED | OUTPATIENT
Start: 2019-08-02 | End: 2019-08-04 | Stop reason: HOSPADM

## 2019-08-02 RX ORDER — DEXTROSE MONOHYDRATE 50 MG/ML
100 INJECTION, SOLUTION INTRAVENOUS PRN
Status: DISCONTINUED | OUTPATIENT
Start: 2019-08-02 | End: 2019-08-04 | Stop reason: HOSPADM

## 2019-08-02 RX ORDER — ACETAMINOPHEN 325 MG/1
650 TABLET ORAL EVERY 4 HOURS PRN
Status: DISCONTINUED | OUTPATIENT
Start: 2019-08-02 | End: 2019-08-04 | Stop reason: HOSPADM

## 2019-08-02 RX ADMIN — ACETAMINOPHEN 650 MG: 325 TABLET ORAL at 18:41

## 2019-08-02 RX ADMIN — CEFEPIME HYDROCHLORIDE 1 G: 1 INJECTION, POWDER, FOR SOLUTION INTRAMUSCULAR; INTRAVENOUS at 12:27

## 2019-08-02 RX ADMIN — SODIUM CHLORIDE: 9 INJECTION, SOLUTION INTRAVENOUS at 19:43

## 2019-08-02 RX ADMIN — GABAPENTIN 200 MG: 100 CAPSULE ORAL at 21:15

## 2019-08-02 RX ADMIN — HEPARIN SODIUM 5000 UNITS: 5000 INJECTION INTRAVENOUS; SUBCUTANEOUS at 12:27

## 2019-08-02 RX ADMIN — INSULIN GLARGINE 30 UNITS: 100 INJECTION, SOLUTION SUBCUTANEOUS at 08:56

## 2019-08-02 RX ADMIN — INSULIN GLARGINE 30 UNITS: 100 INJECTION, SOLUTION SUBCUTANEOUS at 21:16

## 2019-08-02 RX ADMIN — HEPARIN SODIUM 5000 UNITS: 5000 INJECTION INTRAVENOUS; SUBCUTANEOUS at 21:15

## 2019-08-02 RX ADMIN — HEPARIN SODIUM 5000 UNITS: 5000 INJECTION INTRAVENOUS; SUBCUTANEOUS at 06:14

## 2019-08-02 RX ADMIN — SODIUM CHLORIDE, PRESERVATIVE FREE 10 ML: 5 INJECTION INTRAVENOUS at 08:41

## 2019-08-02 RX ADMIN — INSULIN LISPRO 1 UNITS: 100 INJECTION, SOLUTION INTRAVENOUS; SUBCUTANEOUS at 17:28

## 2019-08-02 RX ADMIN — CEFEPIME HYDROCHLORIDE 1 G: 1 INJECTION, POWDER, FOR SOLUTION INTRAMUSCULAR; INTRAVENOUS at 00:41

## 2019-08-02 RX ADMIN — SODIUM CHLORIDE, PRESERVATIVE FREE 10 ML: 5 INJECTION INTRAVENOUS at 21:24

## 2019-08-02 RX ADMIN — SODIUM CHLORIDE: 9 INJECTION, SOLUTION INTRAVENOUS at 08:40

## 2019-08-02 ASSESSMENT — PAIN SCALES - GENERAL
PAINLEVEL_OUTOF10: 0
PAINLEVEL_OUTOF10: 0
PAINLEVEL_OUTOF10: 4
PAINLEVEL_OUTOF10: 0
PAINLEVEL_OUTOF10: 2

## 2019-08-02 ASSESSMENT — PAIN DESCRIPTION - PAIN TYPE: TYPE: ACUTE PAIN

## 2019-08-02 ASSESSMENT — PAIN DESCRIPTION - LOCATION: LOCATION: HEAD

## 2019-08-02 ASSESSMENT — PAIN DESCRIPTION - DESCRIPTORS: DESCRIPTORS: NAGGING;HEADACHE

## 2019-08-02 NOTE — PROGRESS NOTES
0612: Shift handoff completed with Concepcion Ugalde nightshift RN. No 4 eye skin assessment required, rey score >18. Pt resting quietly in bed.    0800: Pt's dgtr called, update given. Stated pt will be upset if she is not discharged today, aware that pt is still on BP support and likely cannot be discharged today. 6634: Critical care rounds completed with Dr Vinicio Hurst. Update given, will actively wean levo, MAP currently 70s-80s.     0856: AM meds administered. No AM SSI required. Pt encouraged to eat breakfast, rolled her eyes and went back to sleep. Upset she may not go home today. 0857: VSS on RA. SR on tele. Low dose levo, will wean. No S&S of pain at rest. Pt laying on left side, no interest in eating breakfast at this time. 1780: AM assessment completed. Bed alarm on to assure pt does not get OOB herself since she is ambulatory and independent at home and upset about no plan for discharge. 1012: Called echo to assure echo gets done this AM in case pt could be discharged later today. 1100: PCA warmed up pt breakfast, pt did not eat it. 1210: Dr Abi Orr met with pt. Update given. 1213: Reassessment completed. VSS on RA. Off levo. SR on tele with PVCs. Pt awake and laughing, ordered lunch. Pt denies pain. 1225: Perfect Serve paged Dr Fallon Herr to discuss downgrade, new orders received shortly after. 1255: Conley leaked. Changed linens. Malissa care and conley care provided. 1330: Dr Fallon Herr met with pt. No new orders, pt aware she is downgraded and could move rooms. 1400: Pt had incontinent stool, PCA cleaned pt up.     1430: Pt assisted OOBTC with occupational therapy. Chair alarm active. 1515: Pt's son at bedside. Update given. SOB normal per pt and pt's son. 1641: VSS on RA. SR on tele. No reassessment completed, MS tele border, stable. 1700: Pt to BSC for BM. Small incontinence prior to getting on BSC. Black stool, pt states she takes iron at home and this is normal for her.      1730: Assisted pt back to bed per her request.    1800: Pt resting quietly with eyes closed. 1830: Pt conley leaked again despite draining over 400 ml recently. Malissa care provided and bed pad changed. Pt set up for dinner. Pt c/o mild headache, PRN tylenol administered. 1929: Shift handoff completed with Marv Tilley, nightshift RN. No 4 eye skin assessment required, rey score >18. Pt resting quietly in bed.    1950: Pt has chills, temp checked, 98.6 orally, 98.9 axillary. HR low 100s. RR mid 20s. Notified primary RN Marv Tilley.     Electronically signed by Claudetta Gutter, RN on 8/2/2019 at 7:27 PM

## 2019-08-02 NOTE — PROGRESS NOTES
Pulmonary Progress Note    CC:  Follow up shock, MICHELLE    Subjective:  Remains on levophed  Urine output adequate  Cr improved  Afebrile  Feels very tired  NO new issues         Intake/Output Summary (Last 24 hours) at 8/2/2019 0700  Last data filed at 8/2/2019 0600  Gross per 24 hour   Intake 5976.23 ml   Output 2840 ml   Net 3136.23 ml         PHYSICAL EXAM:  Blood pressure 129/66, pulse 83, temperature 98.7 °F (37.1 °C), temperature source Oral, resp. rate 17, height 5' 7\" (1.702 m), weight 263 lb 14.3 oz (119.7 kg), SpO2 94 %.'  Gen: No distress. pale  Eyes: PERRL. No sclera icterus. No conjunctival injection. ENT: No discharge. Pharynx clear. External appearance of ears and nose normal.  Neck: Trachea midline. No obvious mass. Resp: No crackles. No wheezes. No rhonchi. No dullness on percussion. CV: Regular rate. Regular rhythm. No murmur or rub. GI: Non-tender. Non-distended. No hernia. Skin: Warm, dry, normal texture and turgor. No nodule on exposed extremities. Lymph: No cervical LAD. No supraclavicular LAD. M/S: No cyanosis. No clubbing. No joint deformity. Neuro: Moves all four extremities. CN 2-12 tested, no defect noted.   Ext:   + edema    Medications:    Scheduled Meds:   sodium chloride flush  10 mL Intravenous 2 times per day    cefepime  1 g Intravenous Q12H    heparin (porcine)  5,000 Units Subcutaneous 3 times per day    gabapentin  200 mg Oral Nightly    insulin glargine  30 Units Subcutaneous BID       Continuous Infusions:   sodium chloride 100 mL/hr at 08/01/19 1727    norepinephrine 5 mcg/min (08/02/19 0613)       PRN Meds:  sodium chloride flush, perflutren lipid microspheres    Labs:  CBC:   Recent Labs     08/01/19  1207 08/01/19 2009 08/02/19 0621   WBC 8.0 9.5 9.6   HGB 11.2* 9.3* 9.4*   HCT 33.4* 27.9* 28.7*   MCV 93.1 94.0 93.2    196 208     BMP:   Recent Labs     08/01/19  1208 08/01/19 2009    135*   K 5.1 5.0    105   CO2 17* 18*   PHOS

## 2019-08-03 ENCOUNTER — APPOINTMENT (OUTPATIENT)
Dept: GENERAL RADIOLOGY | Age: 74
DRG: 871 | End: 2019-08-03
Payer: MEDICARE

## 2019-08-03 LAB
ANION GAP SERPL CALCULATED.3IONS-SCNC: 17 MMOL/L (ref 3–16)
BASE EXCESS ARTERIAL: -8.8 MMOL/L (ref -3–3)
BASOPHILS ABSOLUTE: 0 K/UL (ref 0–0.2)
BASOPHILS RELATIVE PERCENT: 0.3 %
BUN BLDV-MCNC: 35 MG/DL (ref 7–20)
CALCIUM SERPL-MCNC: 9.3 MG/DL (ref 8.3–10.6)
CARBOXYHEMOGLOBIN ARTERIAL: 1.2 % (ref 0–1.5)
CHLORIDE BLD-SCNC: 107 MMOL/L (ref 99–110)
CO2: 16 MMOL/L (ref 21–32)
CREAT SERPL-MCNC: 2.1 MG/DL (ref 0.6–1.2)
EOSINOPHILS ABSOLUTE: 0 K/UL (ref 0–0.6)
EOSINOPHILS RELATIVE PERCENT: 0.3 %
FERRITIN: 519.4 NG/ML (ref 15–150)
GFR AFRICAN AMERICAN: 28
GFR NON-AFRICAN AMERICAN: 23
GLUCOSE BLD-MCNC: 112 MG/DL (ref 70–99)
GLUCOSE BLD-MCNC: 143 MG/DL (ref 70–99)
GLUCOSE BLD-MCNC: 147 MG/DL (ref 70–99)
GLUCOSE BLD-MCNC: 181 MG/DL (ref 70–99)
GLUCOSE BLD-MCNC: 185 MG/DL (ref 70–99)
HCO3 ARTERIAL: 15.6 MMOL/L (ref 21–29)
HCT VFR BLD CALC: 32 % (ref 36–48)
HEMOGLOBIN, ART, EXTENDED: 10.2 G/DL (ref 12–16)
HEMOGLOBIN: 10.6 G/DL (ref 12–16)
IRON SATURATION: 6 % (ref 15–50)
IRON: 14 UG/DL (ref 37–145)
LYMPHOCYTES ABSOLUTE: 0.9 K/UL (ref 1–5.1)
LYMPHOCYTES RELATIVE PERCENT: 9.6 %
MAGNESIUM: 1.8 MG/DL (ref 1.8–2.4)
MCH RBC QN AUTO: 30.7 PG (ref 26–34)
MCHC RBC AUTO-ENTMCNC: 33 G/DL (ref 31–36)
MCV RBC AUTO: 93.1 FL (ref 80–100)
METHEMOGLOBIN ARTERIAL: 0.7 %
MONOCYTES ABSOLUTE: 0.2 K/UL (ref 0–1.3)
MONOCYTES RELATIVE PERCENT: 2.7 %
NEUTROPHILS ABSOLUTE: 7.7 K/UL (ref 1.7–7.7)
NEUTROPHILS RELATIVE PERCENT: 87.1 %
O2 CONTENT ARTERIAL: 14 ML/DL
O2 SAT, ARTERIAL: 98.2 %
O2 THERAPY: ABNORMAL
PCO2 ARTERIAL: 29.9 MMHG (ref 35–45)
PDW BLD-RTO: 16.4 % (ref 12.4–15.4)
PERFORMED ON: ABNORMAL
PH ARTERIAL: 7.34 (ref 7.35–7.45)
PHOSPHORUS: 3.1 MG/DL (ref 2.5–4.9)
PLATELET # BLD: 200 K/UL (ref 135–450)
PMV BLD AUTO: 8.6 FL (ref 5–10.5)
PO2 ARTERIAL: 106 MMHG (ref 75–108)
POTASSIUM SERPL-SCNC: 4.5 MMOL/L (ref 3.5–5.1)
RBC # BLD: 3.44 M/UL (ref 4–5.2)
SODIUM BLD-SCNC: 140 MMOL/L (ref 136–145)
TCO2 ARTERIAL: 16.5 MMOL/L
TOTAL IRON BINDING CAPACITY: 224 UG/DL (ref 260–445)
WBC # BLD: 8.9 K/UL (ref 4–11)

## 2019-08-03 PROCEDURE — 83550 IRON BINDING TEST: CPT

## 2019-08-03 PROCEDURE — 36600 WITHDRAWAL OF ARTERIAL BLOOD: CPT

## 2019-08-03 PROCEDURE — 97530 THERAPEUTIC ACTIVITIES: CPT

## 2019-08-03 PROCEDURE — 94640 AIRWAY INHALATION TREATMENT: CPT

## 2019-08-03 PROCEDURE — 6360000002 HC RX W HCPCS: Performed by: HOSPITALIST

## 2019-08-03 PROCEDURE — 83735 ASSAY OF MAGNESIUM: CPT

## 2019-08-03 PROCEDURE — 84100 ASSAY OF PHOSPHORUS: CPT

## 2019-08-03 PROCEDURE — 6370000000 HC RX 637 (ALT 250 FOR IP): Performed by: INTERNAL MEDICINE

## 2019-08-03 PROCEDURE — 80048 BASIC METABOLIC PNL TOTAL CA: CPT

## 2019-08-03 PROCEDURE — 6360000002 HC RX W HCPCS: Performed by: INTERNAL MEDICINE

## 2019-08-03 PROCEDURE — 83540 ASSAY OF IRON: CPT

## 2019-08-03 PROCEDURE — 2580000003 HC RX 258: Performed by: INTERNAL MEDICINE

## 2019-08-03 PROCEDURE — 71045 X-RAY EXAM CHEST 1 VIEW: CPT

## 2019-08-03 PROCEDURE — 82728 ASSAY OF FERRITIN: CPT

## 2019-08-03 PROCEDURE — 6360000002 HC RX W HCPCS: Performed by: NURSE PRACTITIONER

## 2019-08-03 PROCEDURE — 97116 GAIT TRAINING THERAPY: CPT

## 2019-08-03 PROCEDURE — 1200000000 HC SEMI PRIVATE

## 2019-08-03 PROCEDURE — 85025 COMPLETE CBC W/AUTO DIFF WBC: CPT

## 2019-08-03 PROCEDURE — 97162 PT EVAL MOD COMPLEX 30 MIN: CPT

## 2019-08-03 PROCEDURE — 94760 N-INVAS EAR/PLS OXIMETRY 1: CPT

## 2019-08-03 PROCEDURE — 82803 BLOOD GASES ANY COMBINATION: CPT

## 2019-08-03 RX ORDER — FUROSEMIDE 10 MG/ML
20 INJECTION INTRAMUSCULAR; INTRAVENOUS ONCE
Status: COMPLETED | OUTPATIENT
Start: 2019-08-03 | End: 2019-08-03

## 2019-08-03 RX ORDER — MAGNESIUM SULFATE IN WATER 40 MG/ML
2 INJECTION, SOLUTION INTRAVENOUS ONCE
Status: COMPLETED | OUTPATIENT
Start: 2019-08-03 | End: 2019-08-03

## 2019-08-03 RX ORDER — ALBUTEROL SULFATE 2.5 MG/3ML
2.5 SOLUTION RESPIRATORY (INHALATION) EVERY 6 HOURS PRN
Status: DISCONTINUED | OUTPATIENT
Start: 2019-08-03 | End: 2019-08-04 | Stop reason: HOSPADM

## 2019-08-03 RX ADMIN — ACETAMINOPHEN 650 MG: 325 TABLET ORAL at 00:41

## 2019-08-03 RX ADMIN — MAGNESIUM SULFATE HEPTAHYDRATE 2 G: 40 INJECTION, SOLUTION INTRAVENOUS at 08:46

## 2019-08-03 RX ADMIN — CEFTRIAXONE 1 G: 1 INJECTION, POWDER, FOR SOLUTION INTRAMUSCULAR; INTRAVENOUS at 12:57

## 2019-08-03 RX ADMIN — INSULIN LISPRO 1 UNITS: 100 INJECTION, SOLUTION INTRAVENOUS; SUBCUTANEOUS at 08:46

## 2019-08-03 RX ADMIN — ALBUTEROL SULFATE 2.5 MG: 2.5 SOLUTION RESPIRATORY (INHALATION) at 06:37

## 2019-08-03 RX ADMIN — FUROSEMIDE 20 MG: 10 INJECTION, SOLUTION INTRAMUSCULAR; INTRAVENOUS at 06:27

## 2019-08-03 RX ADMIN — SODIUM CHLORIDE: 9 INJECTION, SOLUTION INTRAVENOUS at 07:32

## 2019-08-03 RX ADMIN — INSULIN LISPRO 1 UNITS: 100 INJECTION, SOLUTION INTRAVENOUS; SUBCUTANEOUS at 18:26

## 2019-08-03 RX ADMIN — HEPARIN SODIUM 5000 UNITS: 5000 INJECTION INTRAVENOUS; SUBCUTANEOUS at 06:27

## 2019-08-03 RX ADMIN — INSULIN GLARGINE 30 UNITS: 100 INJECTION, SOLUTION SUBCUTANEOUS at 08:46

## 2019-08-03 RX ADMIN — HEPARIN SODIUM 5000 UNITS: 5000 INJECTION INTRAVENOUS; SUBCUTANEOUS at 12:58

## 2019-08-03 RX ADMIN — ACETAMINOPHEN 650 MG: 325 TABLET ORAL at 23:09

## 2019-08-03 RX ADMIN — INSULIN GLARGINE 30 UNITS: 100 INJECTION, SOLUTION SUBCUTANEOUS at 22:59

## 2019-08-03 RX ADMIN — GABAPENTIN 200 MG: 100 CAPSULE ORAL at 23:09

## 2019-08-03 RX ADMIN — INSULIN LISPRO 1 UNITS: 100 INJECTION, SOLUTION INTRAVENOUS; SUBCUTANEOUS at 12:57

## 2019-08-03 RX ADMIN — INSULIN LISPRO 1 UNITS: 100 INJECTION, SOLUTION INTRAVENOUS; SUBCUTANEOUS at 23:00

## 2019-08-03 RX ADMIN — SODIUM CHLORIDE, PRESERVATIVE FREE 10 ML: 5 INJECTION INTRAVENOUS at 22:59

## 2019-08-03 RX ADMIN — HEPARIN SODIUM 5000 UNITS: 5000 INJECTION INTRAVENOUS; SUBCUTANEOUS at 23:00

## 2019-08-03 RX ADMIN — CEFEPIME HYDROCHLORIDE 1 G: 1 INJECTION, POWDER, FOR SOLUTION INTRAMUSCULAR; INTRAVENOUS at 00:37

## 2019-08-03 ASSESSMENT — PAIN SCALES - GENERAL
PAINLEVEL_OUTOF10: 0
PAINLEVEL_OUTOF10: 3
PAINLEVEL_OUTOF10: 0
PAINLEVEL_OUTOF10: 2
PAINLEVEL_OUTOF10: 0

## 2019-08-03 ASSESSMENT — PAIN DESCRIPTION - DESCRIPTORS
DESCRIPTORS: HEADACHE
DESCRIPTORS: NAGGING;HEADACHE

## 2019-08-03 ASSESSMENT — PAIN DESCRIPTION - LOCATION
LOCATION: HEAD
LOCATION: HEAD

## 2019-08-03 ASSESSMENT — PAIN DESCRIPTION - PAIN TYPE
TYPE: ACUTE PAIN
TYPE: ACUTE PAIN

## 2019-08-03 NOTE — PROGRESS NOTES
1915:  Received report/handoff from Marinell Humbles, [place]. Patient is sitting in bed, semi-fowlers. Denies c/o distress or discomfort. 2000:  Shift assessment completed. 2245:  incont of med amount of dark BM. Malissa-care/conley cath care completed. Patient tolerated well. 0000: patient is dozing, awakens easily. 0215:  Patient incont of small amount of BM. Malissa-care/conley cath care completed. Patient tolerated well. 0530:  Patient with c/o sob. RR 30. SpO2 05%. Lungs with faint, mod pitched wheezes. Placed on Oxygen 2L/nc, elevated HOB to high fowlers. 0540: perfect served Hospitalist.  Received orders for CXR, breathing TX, ABG. Contacted resp TX to advise of new orders. Contacted imaging to advise of new CXR order. 3851:  Dr Jessica Santiago at bedside. 0615: ABG completed and sent. 0903:  CXR completed. Patient tolerated well, RR 20's. SpO2 100%. 7974:  Lasix 20mg administered. 0700:  Patient is lying on her side, resp reg/easy, no wheezing noted. Pt is dozing intermittently.

## 2019-08-03 NOTE — PROGRESS NOTES
Current Treatment Recommendations: Functional Mobility Training  Safety Devices  Type of devices: Left in chair, Nurse notified, Chair alarm in place, Call light within reach, Patient at risk for falls     AM-PAC Score  AM-PAC Inpatient Mobility Raw Score : 18 (08/03/19 1435)  AM-PAC Inpatient T-Scale Score : 43.63 (08/03/19 1435)  Mobility Inpatient CMS 0-100% Score: 46.58 (08/03/19 1435)  Mobility Inpatient CMS G-Code Modifier : CK (08/03/19 1435)    Cynthia Kunz Credit scored a 18/24 on the AM-PAC short mobility form. Current research shows that an AM-PAC score of 18 or greater is typically associated with a discharge to the patient's home setting. Based on the patients AM-PAC score and their current functional mobility deficits, it is recommended that the patient have 2-3 sessions per week of Physical Therapy at d/c to increase the patients independence. Goals  Short term goals  Time Frame for Short term goals: by acute d/c:  Short term goal 1: bed mobiltiy S   Short term goal 2: transfers S   Short term goal 3: amb > 150 ft with RW and S, no lob. Short term goal 4: assess steps as needed for d/c     Patient Goals   Patient goals : pt's goal is home ASAP/TODAY! Therapy Time   Individual Concurrent Group Co-treatment   Time In 4786         Time Out 1435         Minutes 55           If patient is discharged prior to the next Physical Therapy visit, please see last written PT note for discharge status.     Sheldon Mas PT Electronically signed by Sheldon Mas PT on 8/3/2019 at 2:39 PM

## 2019-08-03 NOTE — PROGRESS NOTES
turgor normal.  No rashes or lesions. Neurologic:  Neurovascularly intact without any focal sensory/motor deficits. Cranial nerves: II-XII intact, grossly non-focal.  Psychiatric: Alert and oriented, thought content appropriate, normal insight  Capillary Refill: Brisk,< 3 seconds   Peripheral Pulses: +2 palpable, equal bilaterally       Labs:   Recent Labs     08/01/19 2009 08/02/19  0621 08/03/19  0550   WBC 9.5 9.6 8.9   HGB 9.3* 9.4* 10.6*   HCT 27.9* 28.7* 32.0*    208 200     Recent Labs     08/01/19 2009 08/02/19  0621 08/03/19  0550   * 140 140   K 5.0 4.9 4.5    109 107   CO2 18* 18* 16*   BUN 60* 48* 35*   CREATININE 3.4* 2.9* 2.1*   CALCIUM 8.8 8.8 9.3   PHOS 3.9 3.1 3.1     No results for input(s): AST, ALT, BILIDIR, BILITOT, ALKPHOS in the last 72 hours. No results for input(s): INR in the last 72 hours. Recent Labs     08/01/19  1208   TROPONINI 0.03*       Urinalysis:      Lab Results   Component Value Date    NITRU Negative 08/01/2019    WBCUA 37 08/01/2019    BACTERIA RARE 08/01/2019    RBCUA 1 08/01/2019    BLOODU MODERATE 08/01/2019    SPECGRAV 1.017 08/01/2019    GLUCOSEU Negative 08/01/2019       Radiology:  XR CHEST PORTABLE   Final Result   Findings are suggestive of pulmonary edema. Pneumonia is not excluded in the   appropriate clinical setting. XR CHEST PORTABLE   Final Result   Placement of right IJ central venous catheter with tip in the proximal SVC. No pneumothorax. Stable mild pulmonary vascular congestion without overt pulmonary edema. Stable cardiomegaly. XR CHEST STANDARD (2 VW)   Final Result   No acute process. Assessment/Plan:    Active Hospital Problems    Diagnosis    Sepsis ([address] [A41.9]    Shock ([address] [R57.9]     1  ARF on CRF-improved with ivf hydration  Follow renal recs     2  Sepsis-improved. Off pressors.  Continue abx      Poss uti as source or other    3  Dm type 2-continue insulin long acting and

## 2019-08-04 VITALS
HEART RATE: 60 BPM | OXYGEN SATURATION: 97 % | TEMPERATURE: 99.2 F | BODY MASS INDEX: 39.15 KG/M2 | WEIGHT: 249.4 LBS | DIASTOLIC BLOOD PRESSURE: 82 MMHG | SYSTOLIC BLOOD PRESSURE: 141 MMHG | HEIGHT: 67 IN | RESPIRATION RATE: 20 BRPM

## 2019-08-04 LAB
ANION GAP SERPL CALCULATED.3IONS-SCNC: 17 MMOL/L (ref 3–16)
BASOPHILS ABSOLUTE: 0 K/UL (ref 0–0.2)
BASOPHILS RELATIVE PERCENT: 0.3 %
BUN BLDV-MCNC: 35 MG/DL (ref 7–20)
CALCIUM SERPL-MCNC: 9.8 MG/DL (ref 8.3–10.6)
CHLORIDE BLD-SCNC: 103 MMOL/L (ref 99–110)
CO2: 18 MMOL/L (ref 21–32)
CREAT SERPL-MCNC: 2 MG/DL (ref 0.6–1.2)
EOSINOPHILS ABSOLUTE: 0.1 K/UL (ref 0–0.6)
EOSINOPHILS RELATIVE PERCENT: 0.5 %
GFR AFRICAN AMERICAN: 30
GFR NON-AFRICAN AMERICAN: 24
GLUCOSE BLD-MCNC: 199 MG/DL (ref 70–99)
GLUCOSE BLD-MCNC: 252 MG/DL (ref 70–99)
HCT VFR BLD CALC: 31.9 % (ref 36–48)
HEMOGLOBIN: 10.6 G/DL (ref 12–16)
LYMPHOCYTES ABSOLUTE: 0.6 K/UL (ref 1–5.1)
LYMPHOCYTES RELATIVE PERCENT: 5.6 %
MAGNESIUM: 2.2 MG/DL (ref 1.8–2.4)
MCH RBC QN AUTO: 30.7 PG (ref 26–34)
MCHC RBC AUTO-ENTMCNC: 33.3 G/DL (ref 31–36)
MCV RBC AUTO: 92.1 FL (ref 80–100)
MONOCYTES ABSOLUTE: 0.9 K/UL (ref 0–1.3)
MONOCYTES RELATIVE PERCENT: 8.2 %
NEUTROPHILS ABSOLUTE: 9.7 K/UL (ref 1.7–7.7)
NEUTROPHILS RELATIVE PERCENT: 85.4 %
PDW BLD-RTO: 15.9 % (ref 12.4–15.4)
PERFORMED ON: ABNORMAL
PHOSPHORUS: 2.8 MG/DL (ref 2.5–4.9)
PLATELET # BLD: 233 K/UL (ref 135–450)
PMV BLD AUTO: 8.5 FL (ref 5–10.5)
POTASSIUM SERPL-SCNC: 4.2 MMOL/L (ref 3.5–5.1)
RBC # BLD: 3.46 M/UL (ref 4–5.2)
SODIUM BLD-SCNC: 138 MMOL/L (ref 136–145)
WBC # BLD: 11.3 K/UL (ref 4–11)

## 2019-08-04 PROCEDURE — 6360000002 HC RX W HCPCS: Performed by: NURSE PRACTITIONER

## 2019-08-04 PROCEDURE — 6370000000 HC RX 637 (ALT 250 FOR IP): Performed by: INTERNAL MEDICINE

## 2019-08-04 PROCEDURE — 36415 COLL VENOUS BLD VENIPUNCTURE: CPT

## 2019-08-04 PROCEDURE — 83735 ASSAY OF MAGNESIUM: CPT

## 2019-08-04 PROCEDURE — 80048 BASIC METABOLIC PNL TOTAL CA: CPT

## 2019-08-04 PROCEDURE — 94760 N-INVAS EAR/PLS OXIMETRY 1: CPT

## 2019-08-04 PROCEDURE — 84100 ASSAY OF PHOSPHORUS: CPT

## 2019-08-04 PROCEDURE — 85025 COMPLETE CBC W/AUTO DIFF WBC: CPT

## 2019-08-04 RX ORDER — METHYLPREDNISOLONE 4 MG/1
TABLET ORAL
Qty: 21 KIT | Refills: 0 | Status: SHIPPED | OUTPATIENT
Start: 2019-08-04 | End: 2019-08-04 | Stop reason: HOSPADM

## 2019-08-04 RX ADMIN — INSULIN LISPRO 3 UNITS: 100 INJECTION, SOLUTION INTRAVENOUS; SUBCUTANEOUS at 09:33

## 2019-08-04 RX ADMIN — HEPARIN SODIUM 5000 UNITS: 5000 INJECTION INTRAVENOUS; SUBCUTANEOUS at 05:07

## 2019-08-04 RX ADMIN — INSULIN GLARGINE 30 UNITS: 100 INJECTION, SOLUTION SUBCUTANEOUS at 09:00

## 2019-08-04 ASSESSMENT — PAIN SCALES - GENERAL
PAINLEVEL_OUTOF10: 0
PAINLEVEL_OUTOF10: 0

## 2019-08-04 NOTE — DISCHARGE INSTR - COC
8/3/2019 2200  Gross per 24 hour   Intake 340 ml   Output 1750 ml   Net -1410 ml     I/O last 3 completed shifts: In:  [P.O.:240;  I.V.:1788; IV Piggyback:100]  Out: 2900 [Urine:2900]    Safety Concerns:     [address] W[address] Safety Concerns:715176934}    Impairments/Disabilities:      [address] MARVIN Impairments/Disabilities:905236414}    Nutrition Therapy:  Current Nutrition Therapy:   { MARVIN Diet List:968897665}    Routes of Feeding: {P DME Other Feedings:373880734}  Liquids: {Slp liquid thickness:92576}  Daily Fluid Restriction: {P DME Yes amt example:277704031}  Last Modified Barium Swallow with Video (Video Swallowing Test): {Done Not Done BEOL:350365614}    Treatments at the Time of Hospital Discharge:   Respiratory Treatments: ***  Oxygen Therapy:  {Therapy; copd oxygen:62277}  Ventilator:    { CC Vent SXVW:353897999}    Rehab Therapies: {THERAPEUTIC INTERVENTION:0633852023}  Weight Bearing Status/Restrictions: [address]  Weight Bearin}  Other Medical Equipment (for information only, NOT a DME order):  {EQUIPMENT:881686844}  Other Treatments: ***    Patient's personal belongings (please select all that are sent with patient):  {[facility] DME Belongings:102038039}    RN SIGNATURE:  {Esignature:174418490}    CASE MANAGEMENT/SOCIAL WORK SECTION    Inpatient Status Date: ***    Readmission Risk Assessment Score:  Readmission Risk              Risk of Unplanned Readmission:        14           Discharging to Facility/ Agency   · Name:   · Address:  · Phone:  · Fax:    Dialysis Facility (if applicable)   · Name:  · Address:  · Dialysis Schedule:  · Phone:  · Fax:    / signature: {Esignature:899510735}    PHYSICIAN SECTION    Prognosis: {Prognosis:2017125120}    Condition at Discharge: [address] Patient Condition:032480741}    Rehab Potential (if transferring to Rehab): {Prognosis:1205832322}    Recommended Labs or Other Treatments After Discharge: ***    Physician Certification: I certify the above

## 2019-08-06 LAB
BLOOD CULTURE, ROUTINE: NORMAL
CULTURE, BLOOD 2: NORMAL

## 2019-12-17 ENCOUNTER — OFFICE VISIT (OUTPATIENT)
Dept: ORTHOPEDIC SURGERY | Age: 74
End: 2019-12-17
Payer: MEDICARE

## 2019-12-17 VITALS — HEIGHT: 67 IN | WEIGHT: 249.34 LBS | BODY MASS INDEX: 39.13 KG/M2

## 2019-12-17 DIAGNOSIS — M17.12 PRIMARY OSTEOARTHRITIS OF LEFT KNEE: ICD-10-CM

## 2019-12-17 DIAGNOSIS — G89.29 CHRONIC PAIN OF LEFT KNEE: Primary | ICD-10-CM

## 2019-12-17 DIAGNOSIS — M25.562 CHRONIC PAIN OF LEFT KNEE: Primary | ICD-10-CM

## 2019-12-17 DIAGNOSIS — M22.42 CHONDROMALACIA PATELLAE OF LEFT KNEE: ICD-10-CM

## 2019-12-17 DIAGNOSIS — M25.561 ACUTE PAIN OF BOTH KNEES: ICD-10-CM

## 2019-12-17 DIAGNOSIS — M25.562 ACUTE PAIN OF BOTH KNEES: ICD-10-CM

## 2019-12-17 PROCEDURE — 1123F ACP DISCUSS/DSCN MKR DOCD: CPT | Performed by: FAMILY MEDICINE

## 2019-12-17 PROCEDURE — 20610 DRAIN/INJ JOINT/BURSA W/O US: CPT | Performed by: FAMILY MEDICINE

## 2019-12-17 PROCEDURE — 1090F PRES/ABSN URINE INCON ASSESS: CPT | Performed by: FAMILY MEDICINE

## 2019-12-17 PROCEDURE — G8417 CALC BMI ABV UP PARAM F/U: HCPCS | Performed by: FAMILY MEDICINE

## 2019-12-17 PROCEDURE — 1036F TOBACCO NON-USER: CPT | Performed by: FAMILY MEDICINE

## 2019-12-17 PROCEDURE — 99214 OFFICE O/P EST MOD 30 MIN: CPT | Performed by: FAMILY MEDICINE

## 2019-12-17 PROCEDURE — 4040F PNEUMOC VAC/ADMIN/RCVD: CPT | Performed by: FAMILY MEDICINE

## 2019-12-17 PROCEDURE — G8427 DOCREV CUR MEDS BY ELIG CLIN: HCPCS | Performed by: FAMILY MEDICINE

## 2019-12-17 PROCEDURE — 3017F COLORECTAL CA SCREEN DOC REV: CPT | Performed by: FAMILY MEDICINE

## 2019-12-17 PROCEDURE — G8400 PT W/DXA NO RESULTS DOC: HCPCS | Performed by: FAMILY MEDICINE

## 2019-12-17 PROCEDURE — G8484 FLU IMMUNIZE NO ADMIN: HCPCS | Performed by: FAMILY MEDICINE

## 2019-12-17 RX ORDER — BETAMETHASONE SODIUM PHOSPHATE AND BETAMETHASONE ACETATE 3; 3 MG/ML; MG/ML
12 INJECTION, SUSPENSION INTRA-ARTICULAR; INTRALESIONAL; INTRAMUSCULAR; SOFT TISSUE ONCE
Status: COMPLETED | OUTPATIENT
Start: 2019-12-17 | End: 2019-12-17

## 2019-12-17 RX ADMIN — BETAMETHASONE SODIUM PHOSPHATE AND BETAMETHASONE ACETATE 12 MG: 3; 3 INJECTION, SUSPENSION INTRA-ARTICULAR; INTRALESIONAL; INTRAMUSCULAR; SOFT TISSUE at 11:16

## 2020-01-14 ENCOUNTER — OFFICE VISIT (OUTPATIENT)
Dept: ORTHOPEDIC SURGERY | Age: 75
End: 2020-01-14
Payer: MEDICARE

## 2020-01-14 VITALS — WEIGHT: 249.34 LBS | BODY MASS INDEX: 39.13 KG/M2 | HEIGHT: 67 IN

## 2020-01-14 PROCEDURE — 1036F TOBACCO NON-USER: CPT | Performed by: FAMILY MEDICINE

## 2020-01-14 PROCEDURE — G8427 DOCREV CUR MEDS BY ELIG CLIN: HCPCS | Performed by: FAMILY MEDICINE

## 2020-01-14 PROCEDURE — 1123F ACP DISCUSS/DSCN MKR DOCD: CPT | Performed by: FAMILY MEDICINE

## 2020-01-14 PROCEDURE — G8484 FLU IMMUNIZE NO ADMIN: HCPCS | Performed by: FAMILY MEDICINE

## 2020-01-14 PROCEDURE — 1090F PRES/ABSN URINE INCON ASSESS: CPT | Performed by: FAMILY MEDICINE

## 2020-01-14 PROCEDURE — G8417 CALC BMI ABV UP PARAM F/U: HCPCS | Performed by: FAMILY MEDICINE

## 2020-01-14 PROCEDURE — 3017F COLORECTAL CA SCREEN DOC REV: CPT | Performed by: FAMILY MEDICINE

## 2020-01-14 PROCEDURE — G8400 PT W/DXA NO RESULTS DOC: HCPCS | Performed by: FAMILY MEDICINE

## 2020-01-14 PROCEDURE — 99213 OFFICE O/P EST LOW 20 MIN: CPT | Performed by: FAMILY MEDICINE

## 2020-01-14 PROCEDURE — 4040F PNEUMOC VAC/ADMIN/RCVD: CPT | Performed by: FAMILY MEDICINE

## 2020-01-14 RX ORDER — METHYLPREDNISOLONE 4 MG/1
TABLET ORAL
Qty: 21 KIT | Refills: 0 | Status: SHIPPED | OUTPATIENT
Start: 2020-01-14 | End: 2020-01-21 | Stop reason: ALTCHOICE

## 2020-01-14 NOTE — PROGRESS NOTES
Chief Complaint  Knee Pain (CK LEFT KNEE)      Janette Patel is a 69 y.o. female is a very pleasant white female very nice patient of  Dr. Jyoti Menon who is a reasonably well-controlled diabetic with her last hemoglobin A1c at 6.1 who has a history of renal disease and neuropathy as well who is being seen today in kind consultation from Dr. Jyoti Menon for evaluation of a new orthopedic condition to her left knee. We had seen her last in the office on 10/25/2018 will be completed Orthovisc to her right knee which is clinically doing well. History of Present Illness for Follow Up Patient:      Hamp Schlatter is being seen in follow up today for chronic left knee pain. The patient finds it difficult to rate their pain on a 0 to 10 scale today. She concludes her knee works but not without pain and difficulty with certain activities. Activities relieving current symptoms include rest and using topical compound cream. Treatment to date includes: rest, ice, NSAID- topical cream, physical therapy, home exercises, cortisone injection on 12/17/2019 which have been somewhat effective to date. Initially she would have rated her improvement about 80% however with therapy last week, they did have her on a NuStep machine in which she was engaging in repetitively flexing the knee beyond 90 degrees which is resulted in increasing knee pain at this time. She has continue with her Voltaren gel. She has been utilizing her knee brace and her Voltaren gel. Have a previous MRI which was performed at [facility] on back in 2016 which did show focal lateral pressure syndrome with focal grade 4 patellofemoral arthropathy with lesser degrees of weightbearing arthritic change          Attest: I have reviewed and attest the documentation of the HPI documented by my . I will make any changes if necessary.      Enc Date: 1/14/2020  Time: 1:00 PM  Provider: Uche Graf MD        Social History     Tobacco Use    Smoking status: instability.  Screening hip testing is benign.     Skin: There are no rashes, ulcerations or lesions. Distal vascular exam appears to be intact.  She does have decrease intact sensation to the dorsums of her feet secondary to her neuropathy without ulceration.     Gait: Fluid gait.  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     Reflex 1+ knees and ankles.     Additional Comments:      Additional Examinations:  Contralateral Exam: Examination of the right knee reveals intact skin.  There is no focal tenderness.  The patient demonstrates full painless range of motion with regards to flexion and extension.  Strength is 5/5 thorough out all planes.  Ligamentous stability is grossly intact.  She does have patellofemoral crepitation at best only a trace knee joint effusion on the right at this point. Vermell Manuel grind testing is not overwhelmingly positive and there is no substantial joint line tenderness.  Reasonable strength with tightness to her hamstrings.     Examination of the bilateral hip reveals intact skin.  The patient demonstrates full painless range of motion with regards to flexion, abduction, internal and external rotation.  There is not tenderness about the greater trochanter. Madison Cedar is a negative straight leg raise against resistance.  Strength is 5/5 thorough out all planes.     Right Lower Extremity: Examination of the right lower extremity does not show any tenderness, deformity or injury.  Range of motion is unremarkable. Madison Cedar is no gross instability.  There are no rashes, ulcerations or lesions.  Strength and tone are normal.  Left Lower Extremity: Examination of the left lower extremity does not show any tenderness, deformity or injury.  Range of motion is unremarkable. Madison Cedar is no gross instability.  There are no rashes, ulcerations or lesions.  Strength and tone are normal.        Diagnostic Test Findings:  Left knee AP and PA weightbearing sunrise and lateral films were once again reviewed from 12/17/2019 and does show mild to moderate medial compartment narrowing with patellofemoral arthropathy and spurring      Assessment & Plan:    Encounter Diagnoses   Name Primary?  Chronic pain of left knee     Chondromalacia patellae of left knee     Primary osteoarthritis of left knee Yes       Orders Placed This Encounter   Procedures    External Referral To Physical Therapy     Referral Priority:   Routine     Referral Type:   Eval and Treat     Referral Reason:   Specialty Services Required     Requested Specialty:   Physical Therapy     Number of Visits Requested:   1         Treatment Plan:  Treatment options were discussed with Nimo Howell. Clinically at this time she has been feeling very much improved up until attending therapy last week when they were having her flex her knee beyond 90 degrees repetitively during her warm up. Which seems to have aggravated her. We want her to continue with her knee rehab with modification in an effort to strengthen her quad and improve her flexibility. Her sugars are under excellent control but with her recent flare we did place her on a Medrol Dosepak to be followed by resuming her Voltaren gel 4 g 4 times daily to her knee. She may be a very well a good candidate for Visco supplementation with Orthovisc and she will think about this over the next couple of weeks. She will continue with use of her knee brace. She will contact us in the interim with questions or concerns we will see her back in a few weeks. This dictation was performed with a verbal recognition program (DRAGON) and it was checked for errors. It is possible that there are still dictated errors within this office note. If so, please bring any errors to my attention for an addendum. All efforts were made to ensure that this office note is accurate.

## 2020-01-16 ENCOUNTER — TELEPHONE (OUTPATIENT)
Dept: ORTHOPEDIC SURGERY | Age: 75
End: 2020-01-16

## 2020-01-16 NOTE — TELEPHONE ENCOUNTER
01/16/2020 [address]  (SERIES OF 3) LEFT KNEE. NO AUTHORIZATION REQUIRED. VALID & BILLABLE. CAN BUY& BILL. PER NOTES FOR THIS [employer].   AP

## 2020-01-16 NOTE — TELEPHONE ENCOUNTER
Spoke to patient and let them know that orthovisc injections have been approved for their left knee. Scheduled patient for those injections. Patient also wanted us to know that she communicated with PT about bike seat being too low and they corrected it.

## 2020-01-21 ENCOUNTER — OFFICE VISIT (OUTPATIENT)
Dept: ORTHOPEDIC SURGERY | Age: 75
End: 2020-01-21
Payer: MEDICARE

## 2020-01-21 VITALS — BODY MASS INDEX: 39.13 KG/M2 | WEIGHT: 249.34 LBS | HEIGHT: 67 IN

## 2020-01-21 PROCEDURE — 1090F PRES/ABSN URINE INCON ASSESS: CPT | Performed by: FAMILY MEDICINE

## 2020-01-21 PROCEDURE — 3017F COLORECTAL CA SCREEN DOC REV: CPT | Performed by: FAMILY MEDICINE

## 2020-01-21 PROCEDURE — 4040F PNEUMOC VAC/ADMIN/RCVD: CPT | Performed by: FAMILY MEDICINE

## 2020-01-21 PROCEDURE — G8400 PT W/DXA NO RESULTS DOC: HCPCS | Performed by: FAMILY MEDICINE

## 2020-01-21 PROCEDURE — 20610 DRAIN/INJ JOINT/BURSA W/O US: CPT | Performed by: FAMILY MEDICINE

## 2020-01-21 PROCEDURE — 99213 OFFICE O/P EST LOW 20 MIN: CPT | Performed by: FAMILY MEDICINE

## 2020-01-21 PROCEDURE — 1036F TOBACCO NON-USER: CPT | Performed by: FAMILY MEDICINE

## 2020-01-21 PROCEDURE — G8417 CALC BMI ABV UP PARAM F/U: HCPCS | Performed by: FAMILY MEDICINE

## 2020-01-21 PROCEDURE — 1123F ACP DISCUSS/DSCN MKR DOCD: CPT | Performed by: FAMILY MEDICINE

## 2020-01-21 PROCEDURE — G8427 DOCREV CUR MEDS BY ELIG CLIN: HCPCS | Performed by: FAMILY MEDICINE

## 2020-01-21 PROCEDURE — G8484 FLU IMMUNIZE NO ADMIN: HCPCS | Performed by: FAMILY MEDICINE

## 2020-01-21 NOTE — PROGRESS NOTES
HPI  Review of systems reviewed from Patient History Form dated on 1/21/2020 and available in the patient's chart under the Media tab. Vital Signs     Ht 5' 7.01\" (1.702 m)   Wt 249 lb 5.4 oz (113.1 kg)   BMI 39.04 kg/m²       General Exam:   Constitutional: Patient is adequately groomed with no evidence of malnutrition  DTRs: Deep tendon reflexes are intact  Mental Status: The patient is oriented to time, place and person. The patient's mood and affect are appropriate. Lymphatic: The lymphatic examination bilaterally reveals all areas to be without enlargement or induration. Vascular: Examination reveals no swelling or calf tenderness. Peripheral pulses are palpable and 2+. Neurological: The patient has good coordination. There is no weakness or sensory deficit. Left knee Examination  Inspection:  There is no high-grade deformity although she does have some patellofemoral crepitation With a trace to 1+ knee effusion.     Palpation:  She does have  is slightly less prominent area of focal tenderness over the medial and lateral patellofemoral facet.  She does have a mildly to moderately positive grind test and only mild pain over the lateral and medial joint line and to lesser degree IT band.  She does have held pain with medial Maria Luisa's testing.     Rang of Motion:  She is stiff the terminal 5-10° of extension with tightness to her hamstrings.  Flexion to 110.     Strength:  4+ out of 5 with knee flexion extension.     Special Tests: Ongoing mildly to moderately positive grind testing reproducing majority of her pain.  Less prominent pain with palpation of the medial joint line and seems to have less pain with medial Maria Luisa's.  Questionable click.  Mild pain with lateral Maria Luisa's. . No instability.  Screening hip testing is benign.     Skin: There are no rashes, ulcerations or lesions.  Distal vascular exam appears to be intact.  She does have decrease intact sensation to the dorsums of her feet secondary to her neuropathy without ulceration.     Gait: Fluid gait.  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     Reflex 1+ knees and ankles.     Additional Comments:      Additional Examinations:  Contralateral Exam: Examination of the right knee reveals intact skin.  There is no focal tenderness.  The patient demonstrates full painless range of motion with regards to flexion and extension.  Strength is 5/5 thorough out all planes.  Ligamentous stability is grossly intact. She does have patellofemoral crepitation at best only a trace knee joint effusion on the right at this point. Earma Fine grind testing is not overwhelmingly positive and there is no substantial joint line tenderness.  Reasonable strength with tightness to her hamstrings.     Examination of the bilateral hip reveals intact skin.  The patient demonstrates full painless range of motion with regards to flexion, abduction, internal and external rotation.  There is not tenderness about the greater trochanter. Jeralene Centers is a negative straight leg raise against resistance.  Strength is 5/5 thorough out all planes.     Right Lower Extremity: Examination of the right lower extremity does not show any tenderness, deformity or injury.  Range of motion is unremarkable. Jeralene Centers is no gross instability.  There are no rashes, ulcerations or lesions.  Strength and tone are normal.  Left Lower Extremity: Examination of the left lower extremity does not show any tenderness, deformity or injury.  Range of motion is unremarkable. Jeralene Centers is no gross instability.  There are no rashes, ulcerations or lesions.  Strength and tone are normal.        Diagnostic Test Findings:  Left knee AP and PA weightbearing sunrise and lateral films were once again reviewed from 12/17/2019 and does show mild to moderate medial compartment narrowing with patellofemoral arthropathy and spurring          Assessment & Plan:    Encounter Diagnoses   Name Primary?     Primary

## 2020-02-04 ENCOUNTER — OFFICE VISIT (OUTPATIENT)
Dept: ORTHOPEDIC SURGERY | Age: 75
End: 2020-02-04
Payer: MEDICARE

## 2020-02-04 VITALS — HEIGHT: 67 IN | WEIGHT: 249.34 LBS | BODY MASS INDEX: 39.13 KG/M2

## 2020-02-04 PROCEDURE — 20610 DRAIN/INJ JOINT/BURSA W/O US: CPT | Performed by: FAMILY MEDICINE

## 2020-02-04 NOTE — PROGRESS NOTES
CC:  FU Knee Osteoarthritis with Viscosupplementation       HPI: Erik Whittaker is a very pleasant 70-year-old white female reasonably well-controlled diabetic with history of neuropathy who is being seen today to continue with her Orthovisc series for her right knee underlying osteoarthritis with chondromalacia. She has noticed a moderate improvement overall of her knee pain symptoms. She did have to miss her appointment last week as she was at home sick with a stomach virus. She is here today for her second Orthovisc injection. She seems to be noticing less popping and grinding. She has not had locking or catching. She has done reasonably well with physical supplementation in the past.      PE: no substantial change in exam.    Assessment:  Knee Osteoarthritis with viscosupplementation      PROCEDURE NOTE:    PRE-PROCEDURE DIAGNOSIS: DJD knee    POST-PROCEDURE DIAGNOSIS: DJD knee    Injection #2 of Orthovisc . PROCEDURE:  With the patient's permission, her right knee was prepped in standard sterile fashion with Betadine and Alcohol and the prefilled 2cc injection of Orthovisc was injected intra-articularly into the right knee via a superolateral approach without difficulty. The patient tolerated this well without difficulty. A band-aid was applied. POST-PROCEDURE INSTRUCTIONS GIVEN TO PATIENT: The patient was advised to ice the knee for 15-20 minutes to relieve any injection site related pain. FOLLOW-UP: as directed. She will continue with her Voltaren gel as well as periodic bracing and physical therapy at [facility]. She'll be seen back next week to complete her Orthovisc series. She will contact us with questions or concerns in the interim.

## 2020-02-11 ENCOUNTER — OFFICE VISIT (OUTPATIENT)
Dept: ORTHOPEDIC SURGERY | Age: 75
End: 2020-02-11
Payer: MEDICARE

## 2020-02-11 VITALS — WEIGHT: 249.34 LBS | HEIGHT: 67 IN | BODY MASS INDEX: 39.13 KG/M2

## 2020-02-11 PROCEDURE — 20610 DRAIN/INJ JOINT/BURSA W/O US: CPT | Performed by: FAMILY MEDICINE

## 2020-02-11 NOTE — PROGRESS NOTES
CC:  FU Knee Osteoarthritis with Viscosupplementation       HPI: Pierce Calvillo is a very pleasant 73-year-old white female reasonably well-controlled diabetic with history of neuropathy who is being seen today to continue with her Orthovisc series for her right knee underlying osteoarthritis with chondromalacia. She has noticed a moderate to significant improvement overall of her knee pain symptoms. She is here today for her third Orthovisc injection. She seems to be noticing less popping and grinding. She has not had locking or catching. She has done reasonably well with physical supplementation in the past.      PE: no substantial change in exam.    Assessment:  Knee Osteoarthritis with viscosupplementation      PROCEDURE NOTE:    PRE-PROCEDURE DIAGNOSIS: DJD knee    POST-PROCEDURE DIAGNOSIS: DJD knee    Injection #3 of Orthovisc . PROCEDURE:  With the patient's permission, her right knee was prepped in standard sterile fashion with Betadine and Alcohol and the prefilled 2cc injection of Orthovisc was injected intra-articularly into the right knee via a superolateral approach without difficulty. The patient tolerated this well without difficulty. A band-aid was applied. POST-PROCEDURE INSTRUCTIONS GIVEN TO PATIENT: The patient was advised to ice the knee for 15-20 minutes to relieve any injection site related pain. FOLLOW-UP: as directed. She will continue with her Voltaren gel as well as periodic bracing and physical therapy at [facility]. She is aware that she can have repeat Visco supplementation in 6 months if necessary although her last round provided substantially longer pain relief. She will contact us with questions or concerns in the interim.

## 2020-05-20 ENCOUNTER — APPOINTMENT (OUTPATIENT)
Dept: CT IMAGING | Age: 75
DRG: 871 | End: 2020-05-20
Payer: MEDICARE

## 2020-05-20 ENCOUNTER — APPOINTMENT (OUTPATIENT)
Dept: GENERAL RADIOLOGY | Age: 75
DRG: 871 | End: 2020-05-20
Payer: MEDICARE

## 2020-05-20 ENCOUNTER — HOSPITAL ENCOUNTER (INPATIENT)
Age: 75
LOS: 4 days | Discharge: HOME HEALTH | DRG: 871 | End: 2020-05-25
Attending: EMERGENCY MEDICINE | Admitting: INTERNAL MEDICINE
Payer: MEDICARE

## 2020-05-20 LAB
A/G RATIO: 0.9 (ref 1.1–2.2)
ALBUMIN SERPL-MCNC: 3.9 G/DL (ref 3.4–5)
ALP BLD-CCNC: 83 U/L (ref 40–129)
ALT SERPL-CCNC: 9 U/L (ref 10–40)
ANION GAP SERPL CALCULATED.3IONS-SCNC: 16 MMOL/L (ref 3–16)
AST SERPL-CCNC: 10 U/L (ref 15–37)
BASE EXCESS VENOUS: -7 MMOL/L
BASOPHILS ABSOLUTE: 0 K/UL (ref 0–0.2)
BASOPHILS RELATIVE PERCENT: 0.1 %
BETA-HYDROXYBUTYRATE: 0.36 MMOL/L (ref 0–0.27)
BILIRUB SERPL-MCNC: 0.5 MG/DL (ref 0–1)
BUN BLDV-MCNC: 56 MG/DL (ref 7–20)
CALCIUM SERPL-MCNC: 9.4 MG/DL (ref 8.3–10.6)
CARBOXYHEMOGLOBIN: 1.6 %
CHLORIDE BLD-SCNC: 101 MMOL/L (ref 99–110)
CO2: 17 MMOL/L (ref 21–32)
CREAT SERPL-MCNC: 2.2 MG/DL (ref 0.6–1.2)
EOSINOPHILS ABSOLUTE: 0 K/UL (ref 0–0.6)
EOSINOPHILS RELATIVE PERCENT: 0 %
GFR AFRICAN AMERICAN: 26
GFR NON-AFRICAN AMERICAN: 22
GLOBULIN: 4.2 G/DL
GLUCOSE BLD-MCNC: 364 MG/DL (ref 70–99)
GLUCOSE BLD-MCNC: 369 MG/DL (ref 70–99)
HCO3 VENOUS: 18 MMOL/L (ref 23–29)
HCT VFR BLD CALC: 32.8 % (ref 36–48)
HEMOGLOBIN: 10.5 G/DL (ref 12–16)
LACTIC ACID: 2.3 MMOL/L (ref 0.4–2)
LIPASE: 78 U/L (ref 13–60)
LYMPHOCYTES ABSOLUTE: 0.7 K/UL (ref 1–5.1)
LYMPHOCYTES RELATIVE PERCENT: 5.9 %
MCH RBC QN AUTO: 29.9 PG (ref 26–34)
MCHC RBC AUTO-ENTMCNC: 32.1 G/DL (ref 31–36)
MCV RBC AUTO: 93.1 FL (ref 80–100)
METHEMOGLOBIN VENOUS: 0.5 %
MONOCYTES ABSOLUTE: 0.9 K/UL (ref 0–1.3)
MONOCYTES RELATIVE PERCENT: 7.6 %
NEUTROPHILS ABSOLUTE: 10.6 K/UL (ref 1.7–7.7)
NEUTROPHILS RELATIVE PERCENT: 86.4 %
O2 CONTENT, VEN: 13 ML/DL
O2 SAT, VEN: 91 %
O2 THERAPY: ABNORMAL
PCO2, VEN: 31.5 MMHG (ref 40–50)
PDW BLD-RTO: 16.5 % (ref 12.4–15.4)
PERFORMED ON: ABNORMAL
PH VENOUS: 7.36 (ref 7.35–7.45)
PLATELET # BLD: 223 K/UL (ref 135–450)
PMV BLD AUTO: 9.5 FL (ref 5–10.5)
PO2, VEN: 59 MMHG
POTASSIUM REFLEX MAGNESIUM: 4.1 MMOL/L (ref 3.5–5.1)
RBC # BLD: 3.52 M/UL (ref 4–5.2)
SODIUM BLD-SCNC: 134 MMOL/L (ref 136–145)
TCO2 CALC VENOUS: 19 MMOL/L
TOTAL PROTEIN: 8.1 G/DL (ref 6.4–8.2)
TROPONIN: <0.01 NG/ML
WBC # BLD: 12.2 K/UL (ref 4–11)

## 2020-05-20 PROCEDURE — 83690 ASSAY OF LIPASE: CPT

## 2020-05-20 PROCEDURE — 87186 SC STD MICRODIL/AGAR DIL: CPT

## 2020-05-20 PROCEDURE — 84484 ASSAY OF TROPONIN QUANT: CPT

## 2020-05-20 PROCEDURE — 93005 ELECTROCARDIOGRAM TRACING: CPT | Performed by: EMERGENCY MEDICINE

## 2020-05-20 PROCEDURE — 99285 EMERGENCY DEPT VISIT HI MDM: CPT

## 2020-05-20 PROCEDURE — 81001 URINALYSIS AUTO W/SCOPE: CPT

## 2020-05-20 PROCEDURE — 82803 BLOOD GASES ANY COMBINATION: CPT

## 2020-05-20 PROCEDURE — 80053 COMPREHEN METABOLIC PANEL: CPT

## 2020-05-20 PROCEDURE — 87077 CULTURE AEROBIC IDENTIFY: CPT

## 2020-05-20 PROCEDURE — 70450 CT HEAD/BRAIN W/O DYE: CPT

## 2020-05-20 PROCEDURE — 82010 KETONE BODYS QUAN: CPT

## 2020-05-20 PROCEDURE — 71045 X-RAY EXAM CHEST 1 VIEW: CPT

## 2020-05-20 PROCEDURE — 87086 URINE CULTURE/COLONY COUNT: CPT

## 2020-05-20 PROCEDURE — 85025 COMPLETE CBC W/AUTO DIFF WBC: CPT

## 2020-05-20 PROCEDURE — 83605 ASSAY OF LACTIC ACID: CPT

## 2020-05-20 PROCEDURE — 87184 SC STD DISK METHOD PER PLATE: CPT

## 2020-05-20 RX ORDER — 0.9 % SODIUM CHLORIDE 0.9 %
1000 INTRAVENOUS SOLUTION INTRAVENOUS ONCE
Status: COMPLETED | OUTPATIENT
Start: 2020-05-20 | End: 2020-05-21

## 2020-05-21 PROBLEM — I10 ESSENTIAL HYPERTENSION: Status: ACTIVE | Noted: 2020-05-21

## 2020-05-21 PROBLEM — R06.82 TACHYPNEA: Status: ACTIVE | Noted: 2020-05-21

## 2020-05-21 PROBLEM — D72.9 NEUTROPHILIC LEUKOCYTOSIS: Status: ACTIVE | Noted: 2020-05-21

## 2020-05-21 PROBLEM — D72.828 NEUTROPHILIC LEUKOCYTOSIS: Status: ACTIVE | Noted: 2020-05-21

## 2020-05-21 PROBLEM — E66.01 MORBID OBESITY DUE TO EXCESS CALORIES (HCC): Status: ACTIVE | Noted: 2020-05-21

## 2020-05-21 PROBLEM — R79.89 ELEVATED LACTIC ACID LEVEL: Status: ACTIVE | Noted: 2020-05-21

## 2020-05-21 PROBLEM — E78.5 HYPERLIPIDEMIA: Status: ACTIVE | Noted: 2020-05-21

## 2020-05-21 PROBLEM — R00.0 TACHYCARDIA: Status: ACTIVE | Noted: 2020-05-21

## 2020-05-21 PROBLEM — N18.30 STAGE 3 CHRONIC KIDNEY DISEASE (HCC): Status: ACTIVE | Noted: 2020-05-21

## 2020-05-21 PROBLEM — E11.9 DMII (DIABETES MELLITUS, TYPE 2) (HCC): Status: ACTIVE | Noted: 2020-05-21

## 2020-05-21 LAB
ANION GAP SERPL CALCULATED.3IONS-SCNC: 16 MMOL/L (ref 3–16)
BACTERIA: ABNORMAL /HPF
BASOPHILS ABSOLUTE: 0 K/UL (ref 0–0.2)
BASOPHILS RELATIVE PERCENT: 0.4 %
BILIRUBIN URINE: NEGATIVE
BLOOD, URINE: ABNORMAL
BUN BLDV-MCNC: 53 MG/DL (ref 7–20)
CALCIUM SERPL-MCNC: 8.8 MG/DL (ref 8.3–10.6)
CHLORIDE BLD-SCNC: 103 MMOL/L (ref 99–110)
CLARITY: ABNORMAL
CO2: 19 MMOL/L (ref 21–32)
COLOR: YELLOW
COMMENT UA: ABNORMAL
CREAT SERPL-MCNC: 2.4 MG/DL (ref 0.6–1.2)
EKG ATRIAL RATE: 94 BPM
EKG DIAGNOSIS: NORMAL
EKG P AXIS: 50 DEGREES
EKG P-R INTERVAL: 112 MS
EKG Q-T INTERVAL: 364 MS
EKG QRS DURATION: 82 MS
EKG QTC CALCULATION (BAZETT): 455 MS
EKG R AXIS: 7 DEGREES
EKG T AXIS: 46 DEGREES
EKG VENTRICULAR RATE: 94 BPM
EOSINOPHILS ABSOLUTE: 0 K/UL (ref 0–0.6)
EOSINOPHILS RELATIVE PERCENT: 0 %
EPITHELIAL CELLS, UA: 1 /HPF (ref 0–5)
ESTIMATED AVERAGE GLUCOSE: 145.6 MG/DL
GFR AFRICAN AMERICAN: 24
GFR NON-AFRICAN AMERICAN: 20
GLUCOSE BLD-MCNC: 202 MG/DL (ref 70–99)
GLUCOSE BLD-MCNC: 271 MG/DL (ref 70–99)
GLUCOSE BLD-MCNC: 306 MG/DL (ref 70–99)
GLUCOSE BLD-MCNC: 311 MG/DL (ref 70–99)
GLUCOSE BLD-MCNC: 350 MG/DL (ref 70–99)
GLUCOSE BLD-MCNC: 376 MG/DL (ref 70–99)
GLUCOSE URINE: >=1000 MG/DL
HBA1C MFR BLD: 6.7 %
HCT VFR BLD CALC: 30.9 % (ref 36–48)
HEMOGLOBIN: 10.2 G/DL (ref 12–16)
HYALINE CASTS: 4 /LPF (ref 0–8)
KETONES, URINE: NEGATIVE MG/DL
LACTIC ACID: 0.8 MMOL/L (ref 0.4–2)
LACTIC ACID: 1 MMOL/L (ref 0.4–2)
LACTIC ACID: 1.3 MMOL/L (ref 0.4–2)
LEUKOCYTE ESTERASE, URINE: ABNORMAL
LYMPHOCYTES ABSOLUTE: 0.9 K/UL (ref 1–5.1)
LYMPHOCYTES RELATIVE PERCENT: 8.7 %
MCH RBC QN AUTO: 30.6 PG (ref 26–34)
MCHC RBC AUTO-ENTMCNC: 33 G/DL (ref 31–36)
MCV RBC AUTO: 92.7 FL (ref 80–100)
MICROSCOPIC EXAMINATION: YES
MONOCYTES ABSOLUTE: 0.8 K/UL (ref 0–1.3)
MONOCYTES RELATIVE PERCENT: 7.8 %
NEUTROPHILS ABSOLUTE: 9.1 K/UL (ref 1.7–7.7)
NEUTROPHILS RELATIVE PERCENT: 83.1 %
NITRITE, URINE: NEGATIVE
PDW BLD-RTO: 16.5 % (ref 12.4–15.4)
PERFORMED ON: ABNORMAL
PH UA: 5.5 (ref 5–8)
PLATELET # BLD: 206 K/UL (ref 135–450)
PMV BLD AUTO: 9.5 FL (ref 5–10.5)
POTASSIUM REFLEX MAGNESIUM: 4 MMOL/L (ref 3.5–5.1)
PRO-BNP: 759 PG/ML (ref 0–124)
PROTEIN UA: 100 MG/DL
RBC # BLD: 3.34 M/UL (ref 4–5.2)
RBC UA: 1 /HPF (ref 0–4)
SODIUM BLD-SCNC: 138 MMOL/L (ref 136–145)
SPECIFIC GRAVITY UA: 1.02 (ref 1–1.03)
TOTAL CK: 78 U/L (ref 26–192)
URINE REFLEX TO CULTURE: YES
URINE TYPE: ABNORMAL
UROBILINOGEN, URINE: 1 E.U./DL
WBC # BLD: 10.9 K/UL (ref 4–11)
WBC UA: 631 /HPF (ref 0–5)

## 2020-05-21 PROCEDURE — 87040 BLOOD CULTURE FOR BACTERIA: CPT

## 2020-05-21 PROCEDURE — 97162 PT EVAL MOD COMPLEX 30 MIN: CPT

## 2020-05-21 PROCEDURE — 83880 ASSAY OF NATRIURETIC PEPTIDE: CPT

## 2020-05-21 PROCEDURE — 6370000000 HC RX 637 (ALT 250 FOR IP): Performed by: NURSE PRACTITIONER

## 2020-05-21 PROCEDURE — 82550 ASSAY OF CK (CPK): CPT

## 2020-05-21 PROCEDURE — 83605 ASSAY OF LACTIC ACID: CPT

## 2020-05-21 PROCEDURE — 97166 OT EVAL MOD COMPLEX 45 MIN: CPT

## 2020-05-21 PROCEDURE — 6370000000 HC RX 637 (ALT 250 FOR IP): Performed by: INTERNAL MEDICINE

## 2020-05-21 PROCEDURE — 97530 THERAPEUTIC ACTIVITIES: CPT

## 2020-05-21 PROCEDURE — 6360000002 HC RX W HCPCS: Performed by: INTERNAL MEDICINE

## 2020-05-21 PROCEDURE — 85025 COMPLETE CBC W/AUTO DIFF WBC: CPT

## 2020-05-21 PROCEDURE — 2580000003 HC RX 258: Performed by: INTERNAL MEDICINE

## 2020-05-21 PROCEDURE — 87150 DNA/RNA AMPLIFIED PROBE: CPT

## 2020-05-21 PROCEDURE — 2580000003 HC RX 258: Performed by: EMERGENCY MEDICINE

## 2020-05-21 PROCEDURE — 36415 COLL VENOUS BLD VENIPUNCTURE: CPT

## 2020-05-21 PROCEDURE — 80048 BASIC METABOLIC PNL TOTAL CA: CPT

## 2020-05-21 PROCEDURE — 83036 HEMOGLOBIN GLYCOSYLATED A1C: CPT

## 2020-05-21 PROCEDURE — 93010 ELECTROCARDIOGRAM REPORT: CPT | Performed by: INTERNAL MEDICINE

## 2020-05-21 PROCEDURE — 6360000002 HC RX W HCPCS: Performed by: EMERGENCY MEDICINE

## 2020-05-21 PROCEDURE — 1200000000 HC SEMI PRIVATE

## 2020-05-21 RX ORDER — ERGOCALCIFEROL 1.25 MG/1
50000 CAPSULE ORAL WEEKLY
COMMUNITY

## 2020-05-21 RX ORDER — DEXTROSE MONOHYDRATE 50 MG/ML
100 INJECTION, SOLUTION INTRAVENOUS PRN
Status: DISCONTINUED | OUTPATIENT
Start: 2020-05-21 | End: 2020-05-25 | Stop reason: HOSPADM

## 2020-05-21 RX ORDER — HEPARIN SODIUM 5000 [USP'U]/ML
5000 INJECTION, SOLUTION INTRAVENOUS; SUBCUTANEOUS EVERY 8 HOURS SCHEDULED
Status: DISCONTINUED | OUTPATIENT
Start: 2020-05-21 | End: 2020-05-25 | Stop reason: HOSPADM

## 2020-05-21 RX ORDER — SODIUM CHLORIDE 0.9 % (FLUSH) 0.9 %
10 SYRINGE (ML) INJECTION PRN
Status: DISCONTINUED | OUTPATIENT
Start: 2020-05-21 | End: 2020-05-25 | Stop reason: HOSPADM

## 2020-05-21 RX ORDER — PATIROMER 8.4 G/1
8.4 POWDER, FOR SUSPENSION ORAL DAILY
COMMUNITY

## 2020-05-21 RX ORDER — NICOTINE POLACRILEX 4 MG
15 LOZENGE BUCCAL PRN
Status: DISCONTINUED | OUTPATIENT
Start: 2020-05-21 | End: 2020-05-25 | Stop reason: HOSPADM

## 2020-05-21 RX ORDER — 0.9 % SODIUM CHLORIDE 0.9 %
200 INTRAVENOUS SOLUTION INTRAVENOUS ONCE
Status: DISCONTINUED | OUTPATIENT
Start: 2020-05-21 | End: 2020-05-25 | Stop reason: HOSPADM

## 2020-05-21 RX ORDER — ACETAMINOPHEN 325 MG/1
650 TABLET ORAL EVERY 4 HOURS PRN
Status: DISCONTINUED | OUTPATIENT
Start: 2020-05-21 | End: 2020-05-25 | Stop reason: HOSPADM

## 2020-05-21 RX ORDER — POLYETHYLENE GLYCOL 3350 17 G/17G
17 POWDER, FOR SOLUTION ORAL DAILY PRN
Status: DISCONTINUED | OUTPATIENT
Start: 2020-05-21 | End: 2020-05-25 | Stop reason: HOSPADM

## 2020-05-21 RX ORDER — ATORVASTATIN CALCIUM 40 MG/1
40 TABLET, FILM COATED ORAL DAILY
Status: DISCONTINUED | OUTPATIENT
Start: 2020-05-21 | End: 2020-05-25 | Stop reason: HOSPADM

## 2020-05-21 RX ORDER — ACETAMINOPHEN 650 MG/1
650 SUPPOSITORY RECTAL EVERY 4 HOURS PRN
Status: DISCONTINUED | OUTPATIENT
Start: 2020-05-21 | End: 2020-05-25 | Stop reason: HOSPADM

## 2020-05-21 RX ORDER — INSULIN GLARGINE 100 [IU]/ML
76 INJECTION, SOLUTION SUBCUTANEOUS 2 TIMES DAILY
Status: DISCONTINUED | OUTPATIENT
Start: 2020-05-21 | End: 2020-05-25 | Stop reason: HOSPADM

## 2020-05-21 RX ORDER — SODIUM CHLORIDE 0.9 % (FLUSH) 0.9 %
10 SYRINGE (ML) INJECTION EVERY 12 HOURS SCHEDULED
Status: DISCONTINUED | OUTPATIENT
Start: 2020-05-21 | End: 2020-05-25 | Stop reason: HOSPADM

## 2020-05-21 RX ORDER — LISINOPRIL 5 MG/1
5 TABLET ORAL DAILY
Status: DISCONTINUED | OUTPATIENT
Start: 2020-05-21 | End: 2020-05-21 | Stop reason: ALTCHOICE

## 2020-05-21 RX ORDER — DEXTROSE MONOHYDRATE 25 G/50ML
12.5 INJECTION, SOLUTION INTRAVENOUS PRN
Status: DISCONTINUED | OUTPATIENT
Start: 2020-05-21 | End: 2020-05-25 | Stop reason: HOSPADM

## 2020-05-21 RX ORDER — SODIUM CHLORIDE 9 MG/ML
INJECTION, SOLUTION INTRAVENOUS CONTINUOUS
Status: DISCONTINUED | OUTPATIENT
Start: 2020-05-21 | End: 2020-05-22

## 2020-05-21 RX ORDER — ONDANSETRON 2 MG/ML
4 INJECTION INTRAMUSCULAR; INTRAVENOUS EVERY 4 HOURS PRN
Status: DISCONTINUED | OUTPATIENT
Start: 2020-05-21 | End: 2020-05-25 | Stop reason: HOSPADM

## 2020-05-21 RX ADMIN — SODIUM CHLORIDE: 9 INJECTION, SOLUTION INTRAVENOUS at 03:06

## 2020-05-21 RX ADMIN — INSULIN LISPRO 5 UNITS: 100 INJECTION, SOLUTION INTRAVENOUS; SUBCUTANEOUS at 21:13

## 2020-05-21 RX ADMIN — ATORVASTATIN CALCIUM 40 MG: 40 TABLET, FILM COATED ORAL at 09:05

## 2020-05-21 RX ADMIN — SODIUM CHLORIDE, PRESERVATIVE FREE 10 ML: 5 INJECTION INTRAVENOUS at 21:14

## 2020-05-21 RX ADMIN — SODIUM CHLORIDE 1000 ML: 9 INJECTION, SOLUTION INTRAVENOUS at 00:12

## 2020-05-21 RX ADMIN — SODIUM CHLORIDE: 9 INJECTION, SOLUTION INTRAVENOUS at 18:26

## 2020-05-21 RX ADMIN — ACETAMINOPHEN 650 MG: 325 TABLET, FILM COATED ORAL at 16:33

## 2020-05-21 RX ADMIN — INSULIN LISPRO 8 UNITS: 100 INJECTION, SOLUTION INTRAVENOUS; SUBCUTANEOUS at 09:04

## 2020-05-21 RX ADMIN — SODIUM CHLORIDE, PRESERVATIVE FREE 10 ML: 5 INJECTION INTRAVENOUS at 09:12

## 2020-05-21 RX ADMIN — LISINOPRIL 5 MG: 5 TABLET ORAL at 09:05

## 2020-05-21 RX ADMIN — INSULIN GLARGINE 76 UNITS: 100 INJECTION, SOLUTION SUBCUTANEOUS at 09:04

## 2020-05-21 RX ADMIN — CEFEPIME HYDROCHLORIDE 1 G: 1 INJECTION, POWDER, FOR SOLUTION INTRAMUSCULAR; INTRAVENOUS at 13:55

## 2020-05-21 RX ADMIN — HEPARIN SODIUM 5000 UNITS: 5000 INJECTION INTRAVENOUS; SUBCUTANEOUS at 21:14

## 2020-05-21 RX ADMIN — INSULIN LISPRO 12 UNITS: 100 INJECTION, SOLUTION INTRAVENOUS; SUBCUTANEOUS at 12:19

## 2020-05-21 RX ADMIN — HEPARIN SODIUM 5000 UNITS: 5000 INJECTION INTRAVENOUS; SUBCUTANEOUS at 06:41

## 2020-05-21 RX ADMIN — INSULIN LISPRO 6 UNITS: 100 INJECTION, SOLUTION INTRAVENOUS; SUBCUTANEOUS at 17:24

## 2020-05-21 RX ADMIN — INSULIN GLARGINE 76 UNITS: 100 INJECTION, SOLUTION SUBCUTANEOUS at 21:13

## 2020-05-21 RX ADMIN — CEFEPIME HYDROCHLORIDE 1 G: 1 INJECTION, POWDER, FOR SOLUTION INTRAMUSCULAR; INTRAVENOUS at 01:26

## 2020-05-21 RX ADMIN — INSULIN GLARGINE 76 UNITS: 100 INJECTION, SOLUTION SUBCUTANEOUS at 03:06

## 2020-05-21 RX ADMIN — INSULIN LISPRO 5 UNITS: 100 INJECTION, SOLUTION INTRAVENOUS; SUBCUTANEOUS at 03:06

## 2020-05-21 RX ADMIN — HEPARIN SODIUM 5000 UNITS: 5000 INJECTION INTRAVENOUS; SUBCUTANEOUS at 13:55

## 2020-05-21 ASSESSMENT — PAIN SCALES - GENERAL
PAINLEVEL_OUTOF10: 0
PAINLEVEL_OUTOF10: 9

## 2020-05-21 ASSESSMENT — PAIN DESCRIPTION - DESCRIPTORS: DESCRIPTORS: ACHING

## 2020-05-21 ASSESSMENT — PAIN DESCRIPTION - LOCATION
LOCATION: KNEE
LOCATION: KNEE;LEG

## 2020-05-21 ASSESSMENT — PAIN DESCRIPTION - PAIN TYPE
TYPE: CHRONIC PAIN
TYPE: CHRONIC PAIN

## 2020-05-21 NOTE — CARE COORDINATION
INITIAL CASE MANAGEMENT ASSESSMENT    Reviewed chart, met with patient to assess possible discharge needs. Explained Case Management role/services. Living Situation: Patient lives in an apartment in a 4 family with 2 steps to enter. ADLs: Independent     DME: CPAP, Glucometer, Walker, shower chair    PT/OT Recs: no eval.     Active Services: none     Transportation: Does not drive/walks or family will transport     Medications: [employer] and [employer] order/no barriers    PCP: Dr. Camilla Bain. Bridget      HD/PD: N/A    PLAN/COMMENTS: Patient is currently lethargic and unable to answer questions. CM spoke with her DIL. Follow for needs. SW/CM provided contact information for patient or family to call with any questions. SW/CM will follow and assist as needed. Electronically signed by Amy Metzger RN on 5/21/2020 at 2:49 PM

## 2020-05-21 NOTE — ED NOTES
Patient presents to ER, altered, alert to self only. Refusing to answer most questions by MD at bedside. Assist x3 to transport patient from wheelchair to bed. Family brought patient to ER, they have not seen the patient since Monday, attempted to call her multiple times, tonight they found the patient in her chair, altered, covered in urine and brought patient to be evaluated.     MD immediately at bedside to see patient     Donell López RN  05/21/20 0626

## 2020-05-21 NOTE — PLAN OF CARE
Problem: Falls - Risk of:  Goal: Will remain free from falls  Description: Will remain free from falls  Outcome: Ongoing  Goal: Absence of physical injury  Description: Absence of physical injury  Outcome: Ongoing     Problem: Pain:  Goal: Control of chronic pain  Description: Control of chronic pain  Outcome: Ongoing   PRN Tylenol given x1 for chronic knee pain    Problem: Discharge Planning:  Goal: Discharged to appropriate level of care  Description: Discharged to appropriate level of care  Outcome: Ongoing

## 2020-05-21 NOTE — PROGRESS NOTES
clear.  Neck: Supple, with full range of motion. No jugular venous distention. Trachea midline. Respiratory:  Normal respiratory effort. Clear to auscultation, bilaterally without Rales/Wheezes/Rhonchi. Cardiovascular: Regular rate and rhythm with normal S1/S2 without murmurs, rubs or gallops. Abdomen: Soft, non-tender, non-distended with normal bowel sounds. Musculoskeletal: No clubbing, cyanosis or edema bilaterally. Full range of motion without deformity. Tenderness to bilateral posterior knees  Skin: Skin color, texture, turgor normal.  No rashes or lesions. Neurologic:  Neurovascularly intact without any focal sensory/motor deficits. Cranial nerves: II-XII intact, grossly non-focal.  Psychiatric: Alert and oriented to self and place but mildly confused  Capillary Refill: Brisk,< 3 seconds   Peripheral Pulses: +2 palpable, equal bilaterally       Labs:   Recent Labs     05/20/20 2312 05/21/20  0400   WBC 12.2* 10.9   HGB 10.5* 10.2*   HCT 32.8* 30.9*    206     Recent Labs     05/20/20 2312 05/21/20  0400   * 138   K 4.1 4.0    103   CO2 17* 19*   BUN 56* 53*   CREATININE 2.2* 2.4*   CALCIUM 9.4 8.8     Recent Labs     05/20/20 2312   AST 10*   ALT 9*   BILITOT 0.5   ALKPHOS 83     No results for input(s): INR in the last 72 hours. Recent Labs     05/20/20 2312   TROPONINI <0.01       Urinalysis:      Lab Results   Component Value Date    NITRU Negative 05/20/2020    WBCUA 631 05/20/2020    BACTERIA 2+ 05/20/2020    RBCUA 1 05/20/2020    BLOODU SMALL 05/20/2020    SPECGRAV 1.020 05/20/2020    GLUCOSEU >=1000 05/20/2020       Radiology:  CT Head WO Contrast   Final Result   No acute intracranial abnormality. XR CHEST PORTABLE   Final Result   No acute cardiopulmonary disease.                  Assessment/Plan:    Active Hospital Problems    Diagnosis Date Noted    Tachycardia [R00.0] 05/21/2020    Tachypnea [R06.82] 42/65/4214    Neutrophilic leukocytosis [B18.7] 05/21/2020  Elevated lactic acid level [R79.89] 05/21/2020    DMII (diabetes mellitus, type 2) ([facility] [E11.9] 05/21/2020    Hyperlipidemia [E78.5] 05/21/2020    Essential hypertension [I10] 05/21/2020    Morbid obesity due to excess calories ([facility] [E66.01] 05/21/2020    Stage 3 chronic kidney disease ([facility] [N18.3] 05/21/2020    Sepsis ([facility] [A41.9] 08/01/2019    Acute cystitis without hematuria [N30.00]     Acute metabolic encephalopathy [N76.81]      Acute cystitis without significant hematuria   - patient was started on Cefepime empirically. -Urine culture and sensitivities have been ordered  - antibiotic therapy will be adjusted as necessary based upon those results.      Sepsis ([facility]   due to above with Tachycardia, Tachypnea, Neutrophilic Leukocytosis. - blood pressure will be monitored closely  - Blood cultures x 2 have been ordered  -UA Pending     Acute metabolic encephalopathy   likely due to acute illness.   -Will monitor and provide supportive care. -OT/PT consulted  -Soc Work consulted- lives alone     Diabetes mellitus II-chronic and stable  - Lantus, SSI and CCD  -5/21 A1C 6.7     Essential (primary) hypertension-chronic and stable  - continue home meds   -Lisinopril on hold creat 2.4 on adm. /61 will need to be restarted on DC     Hyperlipidemia-chronic and stable   - No current evidence of Rhabdomyolysis or other adverse effects. Continue statin therapy while in the hospital   -Lipitor  Chronic Renal Failure stage III -chronic and stable  - Renal function is at baseline and is stable; Monitor renal function and avoid Nephrotoxic agents as able      Morbid obesity due to excess calories (Body mass index is 45.73 kg/m². )   - Complicating assessment and treatment. Placing patient at risk for multiple co-morbidities as well as early death and contributing to the patient's presentation.   on weight loss when appropriate    DVT Prophylaxis: heparin  Diet: DIET CARB CONTROL;  Code Status:

## 2020-05-21 NOTE — ED PROVIDER NOTES
[facility]  [facility]      Pt Name: Sylvain Zaragoza  MRN: [id number]  [place] 1945  Date ofevaluation: 5/20/2020  Provider: Melanie Tinsley MD    CHIEF COMPLAINT       Chief Complaint   Patient presents with    Altered Mental Status     patient presents to ER altered, alert to self only. refusing to answer questions. family states they have been unable to get ahold of her since monday. sent her to be evaluated         HISTORY OF PRESENT ILLNESS   (Location/Symptom, Timing/Onset,Context/Setting, Quality, Duration, Modifying Factors, Severity)  Note limiting factors. Sylvain Zaragoza is a 69 y.o. female  who  has a past medical history of Arthritis, Back pain, Diabetes ([address] Hyperlipidemia, and Hypertension. who presents to the emergency department for evaluation of altered mental status. Family reports that they have not been to contact the patient since the previous Monday. They went to visit the patient today and family ported that the patient's apartment was in [address] [facility] and she had apparently not been out of her chair in multiple days. He states she was covered in urine and appeared to be confused. Family reported that the patient has had a presentation like this before when she had urinary tract infections. On arrival to the ED the patient is following commands and does answer questions with short answers but would not give further information. HPI    NursingNotes were reviewed. REVIEW OF SYSTEMS    (2-9 systems for level 4, 10 or more for level 5)     Review of Systems   Unable to perform ROS: Mental status change       Except as noted above the remainder of the review of systems was reviewed and negative.        PAST MEDICAL HISTORY     Past Medical History:   Diagnosis Date    Arthritis     Back pain     Diabetes ([address]     Hyperlipidemia     Hypertension          SURGICALHISTORY       Past Surgical History:   Procedure Laterality Date  ABDOMEN SURGERY      BACK SURGERY      neck surgery too    CHOLECYSTECTOMY  01/01/2017    HYSTERECTOMY           CURRENT MEDICATIONS       Previous Medications    ATORVASTATIN (LIPITOR) 40 MG TABLET    Take 40 mg by mouth daily    BD PEN NEEDLE FANNIE U/F 32G X 4 MM MISC        DICLOFENAC (PENNSAID) 2 % SOLN    Place 40 g onto the skin 2 times daily 2 pumps    FENOFIBRATE 160 MG TABLET    Take 160 mg by mouth daily     GABAPENTIN (NEURONTIN) 300 MG CAPSULE    Take 600 mg by mouth nightly. LANTUS SOLOSTAR 100 UNIT/ML INJECTION PEN    Inject 76 Units into the skin 2 times daily     LISINOPRIL (PRINIVIL;ZESTRIL) 5 MG TABLET    Take 5 mg by mouth daily     NOVOLOG FLEXPEN 100 UNIT/ML INJECTION PEN    Inject 3-10 Units into the skin 3 times daily (with meals)            Ace inhibitors; Duloxetine hcl; and Oxycodone    FAMILY HISTORY     No family history on file.        SOCIAL HISTORY       Social History     Socioeconomic History    Marital status:      Spouse name: Not on file    Number of children: Not on file    Years of education: Not on file    Highest education level: Not on file   Occupational History    Not on file   Social Needs    Financial resource strain: Not on file    Food insecurity     Worry: Not on file     Inability: Not on file    Transportation needs     Medical: Not on file     Non-medical: Not on file   Tobacco Use    Smoking status: Never Smoker    Smokeless tobacco: Never Used   Substance and Sexual Activity    Alcohol use: No     Alcohol/week: 0.0 standard drinks    Drug use: No    Sexual activity: Not Currently   Lifestyle    Physical activity     Days per week: Not on file     Minutes per session: Not on file    Stress: Not on file   Relationships    Social connections     Talks on phone: Not on file     Gets together: Not on file     Attends [employer] service: Not on file     Active member of club or organization: Not on file     Attends meetings of clubs or organizations: Not on file     Relationship status: Not on file    Intimate partner violence     Fear of current or ex partner: Not on file     Emotionally abused: Not on file     Physically abused: Not on file     Forced sexual activity: Not on file   Other Topics Concern    Not on file   Social History Narrative    Not on file       SCREENINGS             PHYSICAL EXAM    (up to 7 for level 4, 8 or more for level 5)     ED Triage Vitals   BP Temp Temp src Pulse Resp SpO2 Height Weight   05/20/20 2340 05/20/20 2346 -- 05/20/20 2345 05/20/20 2345 05/20/20 2340 -- 05/21/20 0030   (!) 141/57 99.6 °F (37.6 °C)  95 27 97 %  258 lb 2.5 oz (117.1 kg)       Physical Exam  Vitals signs and nursing note reviewed. Constitutional:       Appearance: She is well-developed. HENT:      Head: Normocephalic and atraumatic. Mouth/Throat:      Mouth: Mucous membranes are moist.      Pharynx: Oropharynx is clear. Eyes:      Extraocular Movements: Extraocular movements intact. Conjunctiva/sclera: Conjunctivae normal.      Pupils: Pupils are equal, round, and reactive to light. Neck:      Musculoskeletal: Normal range of motion. Trachea: No tracheal deviation. Cardiovascular:      Rate and Rhythm: Normal rate and regular rhythm. Heart sounds: Normal heart sounds. Pulmonary:      Effort: Pulmonary effort is normal.      Breath sounds: Normal breath sounds. Abdominal:      General: There is no distension. Palpations: Abdomen is soft. Tenderness: There is no abdominal tenderness. There is no guarding or rebound. Musculoskeletal: Normal range of motion. Skin:     General: Skin is warm and dry. Capillary Refill: Capillary refill takes less than 2 seconds. Neurological:      General: No focal deficit present. Mental Status: She is alert and oriented to person, place, and time. Mental status is at baseline. Cranial Nerves: No cranial nerve deficit.       Sensory: No sensory deficit. Motor: No weakness. RESULTS     EKG: All EKG's are interpreted by the Emergency Department Physician who either signs or Co-signsthis chart in the absence of a cardiologist.    EKG shows a sinus rhythm with a ventricular of 94 bpm.  Patient's IA interval and QTc interval are within acceptable range. Patient has a normal axis. There are no significant ST elevations depressions EKG is nondiagnostic for ACS. Compared EKG from 8/1/2019 did not appreciate significant changes. RADIOLOGY:   Non-plain filmimages such as CT, Ultrasound and MRI are read by the radiologist. Plain radiographic images are visualized and preliminarily interpreted by the emergency physician with the below findings:      Interpretation per the Radiologist below, if available at the time ofthis note:    CT Head WO Contrast   Final Result   No acute intracranial abnormality. XR CHEST PORTABLE   Final Result   No acute cardiopulmonary disease.                ED BEDSIDE ULTRASOUND:   Performed by ED Physician - none    LABS:  Labs Reviewed   LACTIC ACID, PLASMA - Abnormal; Notable for the following components:       Result Value    Lactic Acid 2.3 (*)     All other components within normal limits    Narrative:     Performed at:  [facility]  [address] [place], [place] [address]   Phone [phone]   URINE RT REFLEX TO CULTURE - Abnormal; Notable for the following components:    Clarity, UA TURBID (*)     Glucose, Ur >=1000 (*)     Blood, Urine SMALL (*)     Protein,  (*)     Leukocyte Esterase, Urine MODERATE (*)     All other components within normal limits    Narrative:     Performed at:  [facility]  [address] [place], [place] [address]   Phone [phone]   BLOOD GAS, VENOUS - Abnormal; Notable for the following components:    pCO2, Nikos 31.5 (*)     HCO3, Venous 18 (*)     All other components within normal limits Phone [phone]   POCT GLUCOSE - Abnormal; Notable for the following components:    POC Glucose 364 (*)     All other components within normal limits    Narrative:     Performed at:  [facility]  [address] [place], [place] [address]   Phone [phone]   CULTURE, BLOOD 1   CULTURE, BLOOD 2   CULTURE, URINE   TROPONIN    Narrative:     Performed at:  [facility]  [address] [place], [place] [address]   Phone [phone]   BRAIN NATRIURETIC PEPTIDE   LACTIC ACID, PLASMA   LACTIC ACID, PLASMA       All other labs were within normal range or not returned as of this dictation. EMERGENCY DEPARTMENT COURSE and DIFFERENTIAL DIAGNOSIS/MDM:   Vitals:    Vitals:    05/20/20 2347 05/21/20 0000 05/21/20 0001 05/21/20 0030   BP: 133/74  (!) 152/62    Pulse: 95 92 94    Resp: 26 24 28    Temp:       SpO2: 96% 96% 97%    Weight:    258 lb 2.5 oz (117.1 kg)       Patient was given thefollowing medications:  Medications   0.9 % sodium chloride bolus (1,000 mLs Intravenous New Bag 5/21/20 0012)   cefepime (MAXIPIME) 1 g in sterile water 10 mL IV syringe (has no administration in time range)   0.9 % sodium chloride bolus (has no administration in time range)       ED COURSE & MEDICAL DECISION MAKING    Pertinent Labs & Imaging studies reviewed. (See chart for details)   -  Patient seen and evaluated in the emergency department. -  Triage and nursing notes reviewed and incorporated. -  Old chart records reviewed and incorporated.   -  Differential diagnosis includes: Differential Diagnosis:    Hypoxemia/ischemic encephalopathy, hepatic encephalopathy   Seizure or postictal state   Alterations of glucose such as hypoglycemia and hyperglycemia    Alterations in perfusions such as hypotension and hypoperfusion    Alterations in electrolytes such as disturbances in sodium or calcium   Infectious processes such as sepsis from a pneumonia or urinary 05/21/2020 12:29:59 AM      PATIENT REFERREDTO:  Yamilet Patel MD  [address] [facility] [address]  [phone]            DISCHARGEMEDICATIONS:  New Prescriptions    No medications on file          (Please note that portions of this note were completed with a voice recognition program.  Efforts were made to edit the dictations but occasionally words are mis-transcribed.)    Monroe Dao MD (electronically signed)  Attending Emergency Physician          Monroe Dao MD  05/21/20 0778

## 2020-05-21 NOTE — PLAN OF CARE
Pt free from falls this shift. Fall precautions in place at all times. Call light always within reach. Pt able and agreeable to contact for safety appropriately. Pt able to express presence/absence of pain and rate pain appropriately using numerical scale. Pain/discomfort being managed with PRN analgesics per MD orders (see MAR). Pain assessed every shift and after interventions. Continuing to work with patient and health care team on discharge plan. Discharge instructions and medication management will be reviewed prior to discharge.

## 2020-05-21 NOTE — H&P
Hospital Medicine  History and Physical    PCP: Chan Daniels MD  Patient Name: Andrea Stevens    Date of Service: Pt seen/examined on 05/21/2020 and admitted to Inpatient with expected LOS greater than two midnights due to medical therapy    CHIEF COMPLAINT:  Pt found at home confused, covered in urine  HISTORY OF PRESENT ILLNESS: Patient is a poor historian at this time, and information for this report is derived from conversation with the emergency room physician and review of the records. Patient is a 68-year-old Woman with a history of hypertension, hyperlipidemia and diabetes mellitus who presents to the emergency room for evaluation after being found at home with an altered mental status and covered in urine. Her family last with in contact with her on Monday and she was normal at that time. In the emergency room she was found to have a urinary tract infection and to be alert to self only. She is being admitted for further evaluation and treatment. Past Medical History:        Diagnosis Date    Arthritis     Back pain     Diabetes ([address]     Hyperlipidemia     Hypertension        Past Surgical History:        Procedure Laterality Date    ABDOMEN SURGERY      BACK SURGERY      neck surgery too    CHOLECYSTECTOMY  01/01/2017    HYSTERECTOMY         Allergies:  Ace inhibitors; Duloxetine hcl; and Oxycodone    Medications Prior to Admission:    Prior to Admission medications    Medication Sig Start Date End Date Taking?  Authorizing Provider   atorvastatin (LIPITOR) 40 MG tablet Take 40 mg by mouth daily 7/9/19   Historical Provider, MD   NOVOLOG FLEXPEN 100 UNIT/ML injection pen Inject 3-10 Units into the skin 3 times daily (with meals) 5/27/19   Historical Provider, MD   diclofenac (PENNSAID) 2 % SOLN Place 40 g onto the skin 2 times daily 2 pumps    Historical Provider, MD   lisinopril (PRINIVIL;ZESTRIL) 5 MG tablet Take 5 mg by mouth daily  2/12/15   Historical Provider, MD   BD PEN NEEDLE easily felt, not easily obliterated with pressures   Skin: Skin color, texture, turgor normal. No rashes or lesions on exposed skin  Neurologic: Neurovascularly intact without any focal sensory/motor deficits. Cranial nerves: II-XII intact, grossly non-focal. Gait was not tested. Psychiatric: Alert and oriented to self only; evasive, poor insight    CBC:   Recent Labs     05/20/20 2312   WBC 12.2*   HGB 10.5*        BMP:    Recent Labs     05/20/20 2312   *   K 4.1      CO2 17*   BUN 56*   CREATININE 2.2*   GLUCOSE 369*     Troponin:   Recent Labs     05/20/20 2312   TROPONINI <0.01     PT/INR:  No results found for: PTINR  U/A:    Lab Results   Component Value Date    LEUKOCYTESUR MODERATE 05/20/2020    RBCUA 1 05/20/2020    SPECGRAV 1.020 05/20/2020    UROBILINOGEN 1.0 05/20/2020    BILIRUBINUR Negative 05/20/2020    BLOODU SMALL 05/20/2020    GLUCOSEU >=1000 05/20/2020    PROTEINU 100 05/20/2020         RAD:   I have independently reviewed and interpreted the imaging studies below and based my recommendations to the patient on those findings. Ct Head Wo Contrast    Result Date: 5/20/2020  EXAMINATION: CT OF THE HEAD WITHOUT CONTRAST  5/20/2020 10:17 pm TECHNIQUE: CT of the head was performed without the administration of intravenous contrast. Dose modulation, iterative reconstruction, and/or weight based adjustment of the mA/kV was utilized to reduce the radiation dose to as low as reasonably achievable. COMPARISON: None. HISTORY: ORDERING SYSTEM PROVIDED HISTORY: ams TECHNOLOGIST PROVIDED HISTORY: Reason for exam:->ams Has a \"code stroke\" or \"stroke alert\" been called? ->No Reason for Exam: ams FINDINGS: BRAIN/VENTRICLES: There is no acute intracranial hemorrhage, mass effect or midline shift. No abnormal extra-axial fluid collection. The gray-white differentiation is maintained without evidence of an acute infarct. There is no evidence of hydrocephalus.  ORBITS: The visualized portion of the orbits demonstrate no acute abnormality. SINUSES: The visualized paranasal sinuses and mastoid air cells demonstrate no acute abnormality. SOFT TISSUES/SKULL:  No acute abnormality of the visualized skull or soft tissues. No acute intracranial abnormality. Xr Chest Portable    Result Date: 5/20/2020  EXAMINATION: ONE XRAY VIEW OF THE CHEST 5/20/2020 11:06 pm COMPARISON: 08/03/2019 HISTORY: ORDERING SYSTEM PROVIDED HISTORY: sob TECHNOLOGIST PROVIDED HISTORY: Reason for exam:->sob Reason for Exam: low blood sugar, sob Acuity: Acute Type of Exam: Initial FINDINGS: Single view of the chest was obtained. No confluent focal pulmonary opacities to suggest acute airspace disease. No evidence of pleural effusion or pneumothorax. Cardiac and mediastinal silhouettes are within normal limits. Cervical fusion hardware. No acute cardiopulmonary disease. EKG:   Read by ER in the absence of a Cardiologist shows sinus rhythm with a ventricular of 94 bpm.  Patient's ND interval and QTc interval are within acceptable range. Patient has a normal axis. There are no significant ST elevations depressions EKG is nondiagnostic for ACS. Compared EKG from 8/1/2019 did not appreciate significant changes. Assessment:   Principal Problem:    Acute cystitis without hematuria  Active Problems:    Sepsis ([address]    Acute metabolic encephalopathy    Tachycardia    Tachypnea    Neutrophilic leukocytosis    Elevated lactic acid level    DMII (diabetes mellitus, type 2) ([facility])    Hyperlipidemia    Essential hypertension    Morbid obesity due to excess calories ([facility])    Stage 3 chronic kidney disease ([address]  Resolved Problems:    * No resolved hospital problems. *      Plan:       Acute cystitis without significant hematuria - patient was started on Cefepime empirically. Urine culture and sensitivities have been ordered, and antibiotic therapy will be adjusted as necessary based upon those results.      Sepsis ([address] due to above with Tachycardia, Tachypnea, Neutrophilic Leukocytosis. Initial Lactic Acid was elevated. - Pt will be resuscitated with 30 cc/Kg IV Fluids and blood pressure will be monitored closely  - Blood cultures x 2 have been ordered    Acute metabolic encephalopathy - likely due to acute illness. Will monitor and provide supportive care. Diabetes mellitus II - Lantus, SSI and CCD    Essential (primary) hypertension - continue home meds and monitor blood pressure    Hyperlipidemia - No current evidence of Rhabdomyolysis or other adverse effects. Continue statin therapy while in the hospital    Chronic Renal Failure stage III - Renal function is at baseline and is stable; Monitor renal function and avoid Nephrotoxic agents as able     Morbid obesity due to excess calories (Body mass index is 45.73 kg/m². ) - Complicating assessment and treatment. Placing patient at risk for multiple co-morbidities as well as early death and contributing to the patient's presentation.  on weight loss when appropriate. DVT Prophylaxis: Heparin  Diet: DIET CARB CONTROL;  Code Status: Full Code  (Advanced care planning has been discussed with patient and/or responsible family member and is reflected in the code status. Further orders associated with this have been entered if appropriate)    Disposition: Anticipate that patient will remain in the hospital for 2 to 3 days depending on further evaluation and clinical course.      Dennis Salgado MD

## 2020-05-21 NOTE — ED NOTES
PIV placed to inside of R wrist on 3rd attempt, patient tolerated well, labs obtained and sent     Chon Correa RN  05/20/20 3479

## 2020-05-21 NOTE — PROGRESS NOTES
level  Home Access: Stairs to enter with rails  Entrance Stairs - Number of Steps: 4  Entrance Stairs - Rails: Both  Bathroom Shower/Tub: Tub/Shower unit, Shower chair with back  Bathroom Toilet: Standard  ADL Assistance: Independent  Homemaking Assistance: Independent  Homemaking Responsibilities: Yes  Ambulation Assistance: Independent  Transfer Assistance: Independent  Active : No  Mode of Transportation: Bus  Occupation: Retired  Leisure & Hobbies: going to [employer]  Additional Comments: Patient states she walks to the [employer]. Cognition   Cognition  Overall Cognitive Status: Exceptions  Arousal/Alertness: Appropriate responses to stimuli  Following Commands: Follows one step commands with increased time  Attention Span: Attends with cues to redirect  Memory: Decreased recall of recent events;Decreased short term memory  Safety Judgement: Decreased awareness of need for assistance;Decreased awareness of need for safety  Insights: Decreased awareness of deficits    Objective     Strength RLE  Strength RLE: Exception  Comment: mild generalized weakness  Strength LLE  Strength LLE: Exception  Comment: mild generalized weakness  Motor Control  Gross Motor?: WNL     Bed mobility  Supine to Sit: Stand by assistance(HOB elevated, use of bedrail)  Sit to Supine: Unable to assess(Pt in chair at end of session)  Transfers  Sit to Stand: Contact guard assistance  Stand to sit: Contact guard assistance  Comment: slow and effortful - c/o bilat knee pain.   Ambulation  Ambulation?: Yes  Ambulation 1  Surface: level tile  Device: Rolling Walker  Assistance: Contact guard assistance  Quality of Gait: slow and effortful  Gait Deviations: Slow Virgie;Decreased step height;Decreased step length  Distance: 15'  Comments: steady throughout  Stairs/Curb  Stairs?: No     Balance  Sitting - Static: Good  Sitting - Dynamic: Good  Standing - Static: Fair;+(with RW)  Standing - Dynamic: Fair;+(with RW)        Plan

## 2020-05-22 LAB
ANION GAP SERPL CALCULATED.3IONS-SCNC: 13 MMOL/L (ref 3–16)
BASOPHILS ABSOLUTE: 0 K/UL (ref 0–0.2)
BASOPHILS RELATIVE PERCENT: 0.2 %
BUN BLDV-MCNC: 53 MG/DL (ref 7–20)
C DIFF TOXIN/ANTIGEN: NORMAL
CALCIUM SERPL-MCNC: 9.1 MG/DL (ref 8.3–10.6)
CHLORIDE BLD-SCNC: 107 MMOL/L (ref 99–110)
CO2: 17 MMOL/L (ref 21–32)
CREAT SERPL-MCNC: 2.1 MG/DL (ref 0.6–1.2)
EOSINOPHILS ABSOLUTE: 0.2 K/UL (ref 0–0.6)
EOSINOPHILS RELATIVE PERCENT: 1.6 %
GFR AFRICAN AMERICAN: 28
GFR NON-AFRICAN AMERICAN: 23
GLUCOSE BLD-MCNC: 124 MG/DL (ref 70–99)
GLUCOSE BLD-MCNC: 131 MG/DL (ref 70–99)
GLUCOSE BLD-MCNC: 144 MG/DL (ref 70–99)
GLUCOSE BLD-MCNC: 149 MG/DL (ref 70–99)
GLUCOSE BLD-MCNC: 158 MG/DL (ref 70–99)
HCT VFR BLD CALC: 29.9 % (ref 36–48)
HEMOGLOBIN: 9.8 G/DL (ref 12–16)
LYMPHOCYTES ABSOLUTE: 0.9 K/UL (ref 1–5.1)
LYMPHOCYTES RELATIVE PERCENT: 9.6 %
MCH RBC QN AUTO: 30 PG (ref 26–34)
MCHC RBC AUTO-ENTMCNC: 32.7 G/DL (ref 31–36)
MCV RBC AUTO: 91.8 FL (ref 80–100)
MONOCYTES ABSOLUTE: 0.9 K/UL (ref 0–1.3)
MONOCYTES RELATIVE PERCENT: 9 %
NEUTROPHILS ABSOLUTE: 7.6 K/UL (ref 1.7–7.7)
NEUTROPHILS RELATIVE PERCENT: 79.6 %
PDW BLD-RTO: 16.5 % (ref 12.4–15.4)
PERFORMED ON: ABNORMAL
PLATELET # BLD: 258 K/UL (ref 135–450)
PMV BLD AUTO: 8.9 FL (ref 5–10.5)
POTASSIUM REFLEX MAGNESIUM: 3.6 MMOL/L (ref 3.5–5.1)
RBC # BLD: 3.26 M/UL (ref 4–5.2)
REPORT: NORMAL
SODIUM BLD-SCNC: 137 MMOL/L (ref 136–145)
WBC # BLD: 9.6 K/UL (ref 4–11)

## 2020-05-22 PROCEDURE — 1200000000 HC SEMI PRIVATE

## 2020-05-22 PROCEDURE — 97530 THERAPEUTIC ACTIVITIES: CPT

## 2020-05-22 PROCEDURE — 80048 BASIC METABOLIC PNL TOTAL CA: CPT

## 2020-05-22 PROCEDURE — 2580000003 HC RX 258: Performed by: INTERNAL MEDICINE

## 2020-05-22 PROCEDURE — 6370000000 HC RX 637 (ALT 250 FOR IP): Performed by: INTERNAL MEDICINE

## 2020-05-22 PROCEDURE — 6360000002 HC RX W HCPCS: Performed by: INTERNAL MEDICINE

## 2020-05-22 PROCEDURE — 85025 COMPLETE CBC W/AUTO DIFF WBC: CPT

## 2020-05-22 PROCEDURE — 87493 C DIFF AMPLIFIED PROBE: CPT

## 2020-05-22 PROCEDURE — 97535 SELF CARE MNGMENT TRAINING: CPT

## 2020-05-22 PROCEDURE — 87449 NOS EACH ORGANISM AG IA: CPT

## 2020-05-22 PROCEDURE — 6370000000 HC RX 637 (ALT 250 FOR IP): Performed by: NURSE PRACTITIONER

## 2020-05-22 PROCEDURE — 87324 CLOSTRIDIUM AG IA: CPT

## 2020-05-22 RX ADMIN — HEPARIN SODIUM 5000 UNITS: 5000 INJECTION INTRAVENOUS; SUBCUTANEOUS at 14:33

## 2020-05-22 RX ADMIN — CEFEPIME HYDROCHLORIDE 1 G: 1 INJECTION, POWDER, FOR SOLUTION INTRAMUSCULAR; INTRAVENOUS at 14:33

## 2020-05-22 RX ADMIN — INSULIN GLARGINE 76 UNITS: 100 INJECTION, SOLUTION SUBCUTANEOUS at 21:49

## 2020-05-22 RX ADMIN — ACETAMINOPHEN 650 MG: 325 TABLET, FILM COATED ORAL at 22:00

## 2020-05-22 RX ADMIN — HEPARIN SODIUM 5000 UNITS: 5000 INJECTION INTRAVENOUS; SUBCUTANEOUS at 21:38

## 2020-05-22 RX ADMIN — SODIUM CHLORIDE, PRESERVATIVE FREE 10 ML: 5 INJECTION INTRAVENOUS at 09:18

## 2020-05-22 RX ADMIN — CEFEPIME HYDROCHLORIDE 1 G: 1 INJECTION, POWDER, FOR SOLUTION INTRAMUSCULAR; INTRAVENOUS at 02:49

## 2020-05-22 RX ADMIN — SODIUM CHLORIDE, PRESERVATIVE FREE 10 ML: 5 INJECTION INTRAVENOUS at 21:38

## 2020-05-22 RX ADMIN — INSULIN LISPRO 3 UNITS: 100 INJECTION, SOLUTION INTRAVENOUS; SUBCUTANEOUS at 17:29

## 2020-05-22 RX ADMIN — HEPARIN SODIUM 5000 UNITS: 5000 INJECTION INTRAVENOUS; SUBCUTANEOUS at 06:52

## 2020-05-22 RX ADMIN — ATORVASTATIN CALCIUM 40 MG: 40 TABLET, FILM COATED ORAL at 09:17

## 2020-05-22 RX ADMIN — INSULIN LISPRO 3 UNITS: 100 INJECTION, SOLUTION INTRAVENOUS; SUBCUTANEOUS at 12:22

## 2020-05-22 RX ADMIN — INSULIN GLARGINE 76 UNITS: 100 INJECTION, SOLUTION SUBCUTANEOUS at 09:17

## 2020-05-22 RX ADMIN — SODIUM CHLORIDE: 9 INJECTION, SOLUTION INTRAVENOUS at 09:18

## 2020-05-22 RX ADMIN — ACETAMINOPHEN 650 MG: 325 TABLET, FILM COATED ORAL at 09:17

## 2020-05-22 RX ADMIN — ACETAMINOPHEN 650 MG: 325 TABLET, FILM COATED ORAL at 05:15

## 2020-05-22 RX ADMIN — ACETAMINOPHEN 650 MG: 325 TABLET, FILM COATED ORAL at 15:33

## 2020-05-22 ASSESSMENT — PAIN DESCRIPTION - PAIN TYPE
TYPE: ACUTE PAIN
TYPE: ACUTE PAIN
TYPE: CHRONIC PAIN

## 2020-05-22 ASSESSMENT — PAIN DESCRIPTION - ONSET
ONSET: ON-GOING

## 2020-05-22 ASSESSMENT — PAIN DESCRIPTION - ORIENTATION
ORIENTATION: RIGHT;LEFT
ORIENTATION: POSTERIOR
ORIENTATION: LEFT;RIGHT

## 2020-05-22 ASSESSMENT — PAIN - FUNCTIONAL ASSESSMENT
PAIN_FUNCTIONAL_ASSESSMENT: PREVENTS OR INTERFERES SOME ACTIVE ACTIVITIES AND ADLS

## 2020-05-22 ASSESSMENT — PAIN DESCRIPTION - PROGRESSION
CLINICAL_PROGRESSION: NOT CHANGED
CLINICAL_PROGRESSION: GRADUALLY WORSENING

## 2020-05-22 ASSESSMENT — PAIN SCALES - GENERAL
PAINLEVEL_OUTOF10: 9
PAINLEVEL_OUTOF10: 3
PAINLEVEL_OUTOF10: 0
PAINLEVEL_OUTOF10: 7
PAINLEVEL_OUTOF10: 7
PAINLEVEL_OUTOF10: 6
PAINLEVEL_OUTOF10: 4
PAINLEVEL_OUTOF10: 0

## 2020-05-22 ASSESSMENT — PAIN DESCRIPTION - DESCRIPTORS
DESCRIPTORS: ACHING;SORE
DESCRIPTORS: SORE;ACHING
DESCRIPTORS: ACHING

## 2020-05-22 ASSESSMENT — PAIN DESCRIPTION - LOCATION
LOCATION: KNEE

## 2020-05-22 ASSESSMENT — PAIN DESCRIPTION - FREQUENCY
FREQUENCY: CONTINUOUS
FREQUENCY: INTERMITTENT
FREQUENCY: CONTINUOUS

## 2020-05-22 NOTE — PROGRESS NOTES
Occupational Therapy  Facility/Department: [facility]  Daily Treatment Note  NAME: Lynne Gonsalez  : 1945  MRN: [id number]    Date of Service: 2020    Discharge Recommendations:  Patient would benefit from continued therapy after discharge, 3-5 sessions per week  OT Equipment Recommendations  Other: defer to [facility] scored a 16/24 on the AM-PAC ADL Inpatient form. Current research shows that an AM-PAC score of 17 or less is typically not associated with a discharge to the patient's home setting. Based on the patient's AM-PAC score and their current ADL deficits, it is recommended that the patient have 3-5 sessions per week of Occupational Therapy at d/c to increase the patient's independence. At this time, this patient lacks the endurance, and/or tolerance for 3 hours of therapy/day, 5-7x/wk and would benefit most from a follow up treatment frequency of 3-5x/wk. Please see assessment section for further patient specific details. If patient discharges prior to next session this note will serve as a discharge summary. Please see below for the latest assessment towards goals. Assessment   Performance deficits / Impairments: Decreased functional mobility ; Decreased strength;Decreased endurance;Decreased ADL status; Decreased safe awareness;Decreased cognition;Decreased balance;Decreased high-level IADLs  Assessment: 68 y/o female admitted  with UTI and sepsis. PTA pt lived at home alone and was independent with ADLs and functional mobility. Today, pt ambulated around room multiple times with RW and SBA- no LOB but slow and reuqired inc time. Pt progressing with standing tolerane for standing components of ADLs. Pt with complain of pain behind her knees limiting mobility. Anticipate pt will require up to max A for LB ADls at this time. Pt is functioning below baseline and would benefit from skilled therapy at this time.  Pt would have to negotiate 4 steps to enter home

## 2020-05-22 NOTE — PROGRESS NOTES
stated age and cooperative. confused  HEENT: Pupils equal, round, and reactive to light. Conjunctivae/corneas clear. Neck: Supple, with full range of motion. No jugular venous distention. Trachea midline. Respiratory:  Normal respiratory effort. Clear to auscultation, bilaterally without Rales/Wheezes/Rhonchi. Cardiovascular: Regular rate and rhythm with normal S1/S2 without murmurs, rubs or gallops. Abdomen: Soft, non-tender, non-distended with normal bowel sounds. Musculoskeletal: No clubbing, cyanosis or edema bilaterally. Full range of motion without deformity. Tenderness to bilateral posterior knees  Skin: Skin color, texture, turgor normal.  No rashes or lesions. Neurologic:  Neurovascularly intact without any focal sensory/motor deficits. Cranial nerves: II-XII intact, grossly non-focal.  Psychiatric: Alert and oriented , thought process nml  Capillary Refill: Brisk,< 3 seconds   Peripheral Pulses: +2 palpable, equal bilaterally       Labs:   Recent Labs     05/20/20 2312 05/21/20 0400 05/22/20  0529   WBC 12.2* 10.9 9.6   HGB 10.5* 10.2* 9.8*   HCT 32.8* 30.9* 29.9*    206 258     Recent Labs     05/20/20 2312 05/21/20 0400 05/22/20  0529   * 138 137   K 4.1 4.0 3.6    103 107   CO2 17* 19* 17*   BUN 56* 53* 53*   CREATININE 2.2* 2.4* 2.1*   CALCIUM 9.4 8.8 9.1     Recent Labs     05/20/20 2312   AST 10*   ALT 9*   BILITOT 0.5   ALKPHOS 83     No results for input(s): INR in the last 72 hours. Recent Labs     05/20/20 2312 05/21/20  0409   CKTOTAL  --  78   TROPONINI <0.01  --        Urinalysis:      Lab Results   Component Value Date    NITRU Negative 05/20/2020    WBCUA 631 05/20/2020    BACTERIA 2+ 05/20/2020    RBCUA 1 05/20/2020    BLOODU SMALL 05/20/2020    SPECGRAV 1.020 05/20/2020    GLUCOSEU >=1000 05/20/2020       Radiology:  CT Head WO Contrast   Final Result   No acute intracranial abnormality.          XR CHEST PORTABLE   Final Result   No acute cardiopulmonary

## 2020-05-22 NOTE — PROGRESS NOTES
Physical Therapy  Facility/Department: [address] MED SURG  Daily Treatment Note  NAME: Jameel Gutiérrez  : 1945  MRN: [id number]    Date of Service: 2020    Discharge Recommendations:  Patient would benefit from continued therapy after discharge, 3-5 sessions per week   PT Equipment Recommendations  Equipment Needed: (will continue to monitor)  Jameel Gutiérrez scored a 14/24 on the AM-PAC short mobility form. Current research shows that an AM-PAC score of 17 or less is typically not associated with a discharge to the patient's home setting. Based on the patient's AM-PAC score and their current functional mobility deficits, it is recommended that the patient have 3-5 sessions per week of Physical Therapy at d/c to increase the patient's independence. At this time, this patient lacks the endurance, and/or tolerance for 3 hours of therapy/day, 5-7x/wk and would benefit most from a follow up treatment frequency of 3-5x/wk. Please see assessment section for further patient specific details. If patient discharges prior to next session this note will serve as a discharge summary. Please see below for the latest assessment towards goals. Assessment   Body structures, Functions, Activity limitations: Decreased functional mobility   Assessment: Pt is a 69 y.o. female who presented to the ED on 20 with AMS, UTI, sepsis - patient found in chair covered in urine. R/O rhabdo. Patient reports she was independent with all functional mobility prior to admission. Today the patient was quite limited with her movement -c/o bilat knee pain - has had multiple injections since 2016. Movement continues to be slow and effortful this date - unsafe to perform the 4 steps needed to enter apt - poor insight into deficits - patient doesn't understand why she needs to get oob when she claims she does not stay in her bed all day at home - unsafe to go home.  Anticipate she will require continued skilled therapy 3-5x/week. Treatment Diagnosis: impaired mobility  Prognosis: Fair  Decision Making: Medium Complexity  History: see below  Exam: see below  Clinical Presentation: evolving  PT Education: PT Role;Plan of Care  Barriers to Learning: cognition  REQUIRES PT FOLLOW UP: Yes  Activity Tolerance  Activity Tolerance: Patient Tolerated treatment well;Patient limited by cognitive status; Patient limited by pain     Patient Diagnosis(es): The primary encounter diagnosis was Altered mental status, unspecified altered mental status type. Diagnoses of Acute cystitis without hematuria, Lactic acidosis, and Hyperglycemia were also pertinent to this visit. has a past medical history of Arthritis, Back pain, Diabetes ([facility] [address] Hyperlipidemia, and Hypertension. has a past surgical history that includes Cholecystectomy (01/01/2017); Hysterectomy; Abdomen surgery; and back surgery. Restrictions  Restrictions/Precautions  Restrictions/Precautions: Fall Risk  Position Activity Restriction  Other position/activity restrictions: Checking for C-diff     Subjective   General  Chart Reviewed: Yes  Additional Pertinent Hx: Pt is a 69 y.o. female who presented to the ED on 5/20/20 with AMS, UTI, sepsis - patient found in chair covered in urine. R/o rhabdo. Response To Previous Treatment: Patient with no complaints from previous session. Family / Caregiver Present: No  Referring Practitioner: LOLIS Brunner CNP  Subjective  Subjective: Pt is agreeable to PT. c/o pain in bilat knee. c/o sitting in the recliner. Orientation  Orientation  Overall Orientation Status: Impaired  Orientation Level: Oriented to place;Oriented to person;Disoriented to time;Disoriented to situation     Cognition   Cognition  Overall Cognitive Status: Exceptions  Arousal/Alertness: Appropriate responses to stimuli  Following Commands:  Follows one step commands with increased time  Attention Span: Attends with cues to redirect  Memory: Decreased

## 2020-05-22 NOTE — PLAN OF CARE
Problem: Falls - Risk of:  Goal: Will remain free from falls  Description: Will remain free from falls  Outcome: Ongoing  Goal: Absence of physical injury  Description: Absence of physical injury  Outcome: Ongoing  Fall risk assessed. Precautions in place. Bed low and locked with side rails up x2. Nonskid socks on when OOB. Bed/chair alarm utilized. Call light within reach. Frequent checks performed. No falls at this time. Problem: Pain:  Description: Pain management should include both nonpharmacologic and pharmacologic interventions.   Goal: Control of chronic pain  Description: Control of chronic pain  Outcome: Ongoing     Problem: Discharge Planning:  Goal: Discharged to appropriate level of care  Description: Discharged to appropriate level of care  Outcome: Ongoing     Problem: Urinary Elimination:  Goal: Signs and symptoms of infection will decrease  Description: Signs and symptoms of infection will decrease  Outcome: Ongoing  Goal: Ability to reestablish a normal urinary elimination pattern will improve - after catheter removal  Description: Ability to reestablish a normal urinary elimination pattern will improve  Outcome: Ongoing  Goal: Complications related to the disease process, condition or treatment will be avoided or minimized  Description: Complications related to the disease process, condition or treatment will be avoided or minimized  Outcome: Ongoing

## 2020-05-23 LAB
ANION GAP SERPL CALCULATED.3IONS-SCNC: 12 MMOL/L (ref 3–16)
BASOPHILS ABSOLUTE: 0 K/UL (ref 0–0.2)
BASOPHILS RELATIVE PERCENT: 0.4 %
BUN BLDV-MCNC: 43 MG/DL (ref 7–20)
C. DIFFICILE TOXIN MOLECULAR: ABNORMAL
CALCIUM SERPL-MCNC: 9.1 MG/DL (ref 8.3–10.6)
CHLORIDE BLD-SCNC: 110 MMOL/L (ref 99–110)
CO2: 18 MMOL/L (ref 21–32)
CREAT SERPL-MCNC: 1.7 MG/DL (ref 0.6–1.2)
EOSINOPHILS ABSOLUTE: 0.2 K/UL (ref 0–0.6)
EOSINOPHILS RELATIVE PERCENT: 2.2 %
GFR AFRICAN AMERICAN: 36
GFR NON-AFRICAN AMERICAN: 29
GLUCOSE BLD-MCNC: 107 MG/DL (ref 70–99)
GLUCOSE BLD-MCNC: 125 MG/DL (ref 70–99)
GLUCOSE BLD-MCNC: 152 MG/DL (ref 70–99)
GLUCOSE BLD-MCNC: 66 MG/DL (ref 70–99)
GLUCOSE BLD-MCNC: 74 MG/DL (ref 70–99)
GLUCOSE BLD-MCNC: 88 MG/DL (ref 70–99)
HCT VFR BLD CALC: 29.1 % (ref 36–48)
HEMOGLOBIN: 9.6 G/DL (ref 12–16)
LYMPHOCYTES ABSOLUTE: 1.3 K/UL (ref 1–5.1)
LYMPHOCYTES RELATIVE PERCENT: 13.9 %
MAGNESIUM: 2.4 MG/DL (ref 1.8–2.4)
MCH RBC QN AUTO: 30.4 PG (ref 26–34)
MCHC RBC AUTO-ENTMCNC: 32.9 G/DL (ref 31–36)
MCV RBC AUTO: 92.4 FL (ref 80–100)
MONOCYTES ABSOLUTE: 0.9 K/UL (ref 0–1.3)
MONOCYTES RELATIVE PERCENT: 9.4 %
NEUTROPHILS ABSOLUTE: 6.7 K/UL (ref 1.7–7.7)
NEUTROPHILS RELATIVE PERCENT: 74.1 %
ORGANISM: ABNORMAL
PDW BLD-RTO: 16.5 % (ref 12.4–15.4)
PERFORMED ON: ABNORMAL
PERFORMED ON: NORMAL
PLATELET # BLD: 255 K/UL (ref 135–450)
PMV BLD AUTO: 8.9 FL (ref 5–10.5)
POTASSIUM REFLEX MAGNESIUM: 3.3 MMOL/L (ref 3.5–5.1)
RBC # BLD: 3.15 M/UL (ref 4–5.2)
SODIUM BLD-SCNC: 140 MMOL/L (ref 136–145)
WBC # BLD: 9.1 K/UL (ref 4–11)

## 2020-05-23 PROCEDURE — 1200000000 HC SEMI PRIVATE

## 2020-05-23 PROCEDURE — 6370000000 HC RX 637 (ALT 250 FOR IP): Performed by: NURSE PRACTITIONER

## 2020-05-23 PROCEDURE — 6370000000 HC RX 637 (ALT 250 FOR IP): Performed by: INTERNAL MEDICINE

## 2020-05-23 PROCEDURE — 83735 ASSAY OF MAGNESIUM: CPT

## 2020-05-23 PROCEDURE — 85025 COMPLETE CBC W/AUTO DIFF WBC: CPT

## 2020-05-23 PROCEDURE — 80048 BASIC METABOLIC PNL TOTAL CA: CPT

## 2020-05-23 PROCEDURE — 6360000002 HC RX W HCPCS: Performed by: INTERNAL MEDICINE

## 2020-05-23 PROCEDURE — 2580000003 HC RX 258: Performed by: INTERNAL MEDICINE

## 2020-05-23 PROCEDURE — 36415 COLL VENOUS BLD VENIPUNCTURE: CPT

## 2020-05-23 RX ORDER — VANCOMYCIN HYDROCHLORIDE 125 MG/1
125 CAPSULE ORAL EVERY 6 HOURS SCHEDULED
Status: DISCONTINUED | OUTPATIENT
Start: 2020-05-23 | End: 2020-05-25 | Stop reason: HOSPADM

## 2020-05-23 RX ADMIN — HEPARIN SODIUM 5000 UNITS: 5000 INJECTION INTRAVENOUS; SUBCUTANEOUS at 05:46

## 2020-05-23 RX ADMIN — SODIUM CHLORIDE, PRESERVATIVE FREE 10 ML: 5 INJECTION INTRAVENOUS at 21:19

## 2020-05-23 RX ADMIN — VANCOMYCIN HYDROCHLORIDE 125 MG: 125 CAPSULE ORAL at 09:49

## 2020-05-23 RX ADMIN — VANCOMYCIN HYDROCHLORIDE 125 MG: 125 CAPSULE ORAL at 15:32

## 2020-05-23 RX ADMIN — ACETAMINOPHEN 650 MG: 325 TABLET, FILM COATED ORAL at 09:49

## 2020-05-23 RX ADMIN — ATORVASTATIN CALCIUM 40 MG: 40 TABLET, FILM COATED ORAL at 09:49

## 2020-05-23 RX ADMIN — HEPARIN SODIUM 5000 UNITS: 5000 INJECTION INTRAVENOUS; SUBCUTANEOUS at 21:18

## 2020-05-23 RX ADMIN — INSULIN GLARGINE 76 UNITS: 100 INJECTION, SOLUTION SUBCUTANEOUS at 21:18

## 2020-05-23 RX ADMIN — ACETAMINOPHEN 650 MG: 325 TABLET, FILM COATED ORAL at 02:44

## 2020-05-23 RX ADMIN — CEFEPIME HYDROCHLORIDE 1 G: 1 INJECTION, POWDER, FOR SOLUTION INTRAMUSCULAR; INTRAVENOUS at 02:12

## 2020-05-23 RX ADMIN — VANCOMYCIN HYDROCHLORIDE 125 MG: 125 CAPSULE ORAL at 18:39

## 2020-05-23 RX ADMIN — INSULIN LISPRO 3 UNITS: 100 INJECTION, SOLUTION INTRAVENOUS; SUBCUTANEOUS at 18:40

## 2020-05-23 RX ADMIN — HEPARIN SODIUM 5000 UNITS: 5000 INJECTION INTRAVENOUS; SUBCUTANEOUS at 15:31

## 2020-05-23 RX ADMIN — SODIUM CHLORIDE, PRESERVATIVE FREE 10 ML: 5 INJECTION INTRAVENOUS at 15:39

## 2020-05-23 RX ADMIN — CEFEPIME HYDROCHLORIDE 1 G: 1 INJECTION, POWDER, FOR SOLUTION INTRAMUSCULAR; INTRAVENOUS at 15:31

## 2020-05-23 RX ADMIN — ACETAMINOPHEN 650 MG: 325 TABLET, FILM COATED ORAL at 15:32

## 2020-05-23 ASSESSMENT — PAIN DESCRIPTION - LOCATION: LOCATION: KNEE

## 2020-05-23 ASSESSMENT — PAIN DESCRIPTION - PAIN TYPE: TYPE: CHRONIC PAIN

## 2020-05-23 ASSESSMENT — PAIN SCALES - GENERAL
PAINLEVEL_OUTOF10: 7
PAINLEVEL_OUTOF10: 7
PAINLEVEL_OUTOF10: 4
PAINLEVEL_OUTOF10: 5
PAINLEVEL_OUTOF10: 0
PAINLEVEL_OUTOF10: 0

## 2020-05-23 ASSESSMENT — PAIN DESCRIPTION - DESCRIPTORS: DESCRIPTORS: ACHING

## 2020-05-23 ASSESSMENT — PAIN - FUNCTIONAL ASSESSMENT: PAIN_FUNCTIONAL_ASSESSMENT: PREVENTS OR INTERFERES SOME ACTIVE ACTIVITIES AND ADLS

## 2020-05-23 ASSESSMENT — PAIN DESCRIPTION - ONSET: ONSET: ON-GOING

## 2020-05-23 ASSESSMENT — PAIN DESCRIPTION - FREQUENCY: FREQUENCY: INTERMITTENT

## 2020-05-23 NOTE — PLAN OF CARE
Problem: Falls - Risk of:  Goal: Will remain free from falls  Description: Will remain free from falls  Outcome: Ongoing  Note: Pt free from falls this shift. Fall precautions in place at all times. Call light always within reach. Pt able and agreeable to contact for safety appropriately. Problem: Discharge Planning:  Goal: Discharged to appropriate level of care  Description: Discharged to appropriate level of care  Outcome: Ongoing  Note: Case management and or  will be consulted to assist pt with any discharge needs for home.       Problem: Urinary Elimination:  Goal: Ability to reestablish a normal urinary elimination pattern will improve - after catheter removal  Description: Ability to reestablish a normal urinary elimination pattern will improve  Outcome: Ongoing

## 2020-05-23 NOTE — PROGRESS NOTES
Pt alert and oriented. Pt c/o chronic arthritis pain in knees at this time. Pt medicated per orders. Pt enc to cough, deep breath, and call for assistance when needed. Fall precautions in place. Call light with in reach. Will cont to monitor.

## 2020-05-24 LAB
ANION GAP SERPL CALCULATED.3IONS-SCNC: 12 MMOL/L (ref 3–16)
BASOPHILS ABSOLUTE: 0 K/UL (ref 0–0.2)
BASOPHILS RELATIVE PERCENT: 0.3 %
BUN BLDV-MCNC: 34 MG/DL (ref 7–20)
CALCIUM SERPL-MCNC: 9.9 MG/DL (ref 8.3–10.6)
CHLORIDE BLD-SCNC: 108 MMOL/L (ref 99–110)
CO2: 19 MMOL/L (ref 21–32)
CREAT SERPL-MCNC: 1.8 MG/DL (ref 0.6–1.2)
CULTURE, BLOOD 2: ABNORMAL
CULTURE, BLOOD 2: ABNORMAL
EOSINOPHILS ABSOLUTE: 0.1 K/UL (ref 0–0.6)
EOSINOPHILS RELATIVE PERCENT: 1.3 %
GFR AFRICAN AMERICAN: 33
GFR NON-AFRICAN AMERICAN: 28
GLUCOSE BLD-MCNC: 146 MG/DL (ref 70–99)
GLUCOSE BLD-MCNC: 69 MG/DL (ref 70–99)
GLUCOSE BLD-MCNC: 82 MG/DL (ref 70–99)
GLUCOSE BLD-MCNC: 82 MG/DL (ref 70–99)
GLUCOSE BLD-MCNC: 99 MG/DL (ref 70–99)
HCT VFR BLD CALC: 30.5 % (ref 36–48)
HEMOGLOBIN: 10.1 G/DL (ref 12–16)
LYMPHOCYTES ABSOLUTE: 1.1 K/UL (ref 1–5.1)
LYMPHOCYTES RELATIVE PERCENT: 11.4 %
MCH RBC QN AUTO: 30.2 PG (ref 26–34)
MCHC RBC AUTO-ENTMCNC: 33.2 G/DL (ref 31–36)
MCV RBC AUTO: 90.9 FL (ref 80–100)
MONOCYTES ABSOLUTE: 0.8 K/UL (ref 0–1.3)
MONOCYTES RELATIVE PERCENT: 8.3 %
NEUTROPHILS ABSOLUTE: 7.5 K/UL (ref 1.7–7.7)
NEUTROPHILS RELATIVE PERCENT: 78.7 %
ORGANISM: ABNORMAL
PDW BLD-RTO: 16.3 % (ref 12.4–15.4)
PERFORMED ON: ABNORMAL
PERFORMED ON: NORMAL
PLATELET # BLD: 273 K/UL (ref 135–450)
PMV BLD AUTO: 8.8 FL (ref 5–10.5)
POTASSIUM REFLEX MAGNESIUM: 3.7 MMOL/L (ref 3.5–5.1)
RBC # BLD: 3.36 M/UL (ref 4–5.2)
SODIUM BLD-SCNC: 139 MMOL/L (ref 136–145)
URINE CULTURE, ROUTINE: ABNORMAL
WBC # BLD: 9.5 K/UL (ref 4–11)

## 2020-05-24 PROCEDURE — 6370000000 HC RX 637 (ALT 250 FOR IP): Performed by: NURSE PRACTITIONER

## 2020-05-24 PROCEDURE — 2580000003 HC RX 258: Performed by: INTERNAL MEDICINE

## 2020-05-24 PROCEDURE — 6370000000 HC RX 637 (ALT 250 FOR IP): Performed by: INTERNAL MEDICINE

## 2020-05-24 PROCEDURE — 36415 COLL VENOUS BLD VENIPUNCTURE: CPT

## 2020-05-24 PROCEDURE — 80048 BASIC METABOLIC PNL TOTAL CA: CPT

## 2020-05-24 PROCEDURE — 1200000000 HC SEMI PRIVATE

## 2020-05-24 PROCEDURE — 6360000002 HC RX W HCPCS: Performed by: INTERNAL MEDICINE

## 2020-05-24 PROCEDURE — 85025 COMPLETE CBC W/AUTO DIFF WBC: CPT

## 2020-05-24 RX ADMIN — ACETAMINOPHEN 650 MG: 325 TABLET, FILM COATED ORAL at 05:31

## 2020-05-24 RX ADMIN — ATORVASTATIN CALCIUM 40 MG: 40 TABLET, FILM COATED ORAL at 09:13

## 2020-05-24 RX ADMIN — VANCOMYCIN HYDROCHLORIDE 125 MG: 125 CAPSULE ORAL at 00:40

## 2020-05-24 RX ADMIN — HEPARIN SODIUM 5000 UNITS: 5000 INJECTION INTRAVENOUS; SUBCUTANEOUS at 22:38

## 2020-05-24 RX ADMIN — CEFEPIME HYDROCHLORIDE 1 G: 1 INJECTION, POWDER, FOR SOLUTION INTRAMUSCULAR; INTRAVENOUS at 02:05

## 2020-05-24 RX ADMIN — SODIUM CHLORIDE, PRESERVATIVE FREE 10 ML: 5 INJECTION INTRAVENOUS at 09:15

## 2020-05-24 RX ADMIN — VANCOMYCIN HYDROCHLORIDE 125 MG: 125 CAPSULE ORAL at 23:35

## 2020-05-24 RX ADMIN — SODIUM CHLORIDE, PRESERVATIVE FREE 10 ML: 5 INJECTION INTRAVENOUS at 22:43

## 2020-05-24 RX ADMIN — INSULIN GLARGINE 76 UNITS: 100 INJECTION, SOLUTION SUBCUTANEOUS at 09:13

## 2020-05-24 RX ADMIN — CEFEPIME HYDROCHLORIDE 1 G: 1 INJECTION, POWDER, FOR SOLUTION INTRAMUSCULAR; INTRAVENOUS at 15:29

## 2020-05-24 RX ADMIN — HEPARIN SODIUM 5000 UNITS: 5000 INJECTION INTRAVENOUS; SUBCUTANEOUS at 15:30

## 2020-05-24 RX ADMIN — VANCOMYCIN HYDROCHLORIDE 125 MG: 125 CAPSULE ORAL at 18:15

## 2020-05-24 RX ADMIN — INSULIN GLARGINE 76 UNITS: 100 INJECTION, SOLUTION SUBCUTANEOUS at 22:38

## 2020-05-24 RX ADMIN — HEPARIN SODIUM 5000 UNITS: 5000 INJECTION INTRAVENOUS; SUBCUTANEOUS at 05:32

## 2020-05-24 RX ADMIN — VANCOMYCIN HYDROCHLORIDE 125 MG: 125 CAPSULE ORAL at 05:31

## 2020-05-24 RX ADMIN — VANCOMYCIN HYDROCHLORIDE 125 MG: 125 CAPSULE ORAL at 12:49

## 2020-05-24 ASSESSMENT — PAIN SCALES - GENERAL
PAINLEVEL_OUTOF10: 0
PAINLEVEL_OUTOF10: 0
PAINLEVEL_OUTOF10: 2

## 2020-05-24 NOTE — PROGRESS NOTES
Hospitalist Progress Note      PCP: Jody Lema MD    Date of Admission: 5/20/2020    Chief Complaint: Pt found at home confused, covered in urine    Hospital Course:   poor historian at this time, and information for this report is derived from conversation with the emergency room physician and review of the records. Patient is a 70-year-old Woman with a history of hypertension, hyperlipidemia and diabetes mellitus who presents to the emergency room for evaluation after being found at home with an altered mental status and covered in urine. Her family last with in contact with her on Monday and she was normal at that time. 5/21  laying in bd, slow to answer but aware she is in the hospital, complains of legs hurting behind her knees. Subjective: Sitting up in chair awake and alert updated on waiting for urine culture    Medications:  Reviewed    Infusion Medications    dextrose       Scheduled Medications    vancomycin  125 mg Oral 4 times per day    insulin glargine  76 Units Subcutaneous BID    atorvastatin  40 mg Oral Daily    sodium chloride flush  10 mL Intravenous 2 times per day    sodium chloride  200 mL Intravenous Once    cefepime  1 g Intravenous Q12H    heparin (porcine)  5,000 Units Subcutaneous 3 times per day    insulin lispro  0-18 Units Subcutaneous TID WC    insulin lispro  0-9 Units Subcutaneous Nightly     PRN Meds: sodium chloride flush, acetaminophen **OR** acetaminophen, polyethylene glycol, ondansetron, glucose, dextrose, glucagon (rDNA), dextrose      Intake/Output Summary (Last 24 hours) at 5/24/2020 0821  Last data filed at 5/24/2020 0220  Gross per 24 hour   Intake 240 ml   Output 300 ml   Net -60 ml       Exam:    BP (!) 147/61   Pulse 75   Temp 98.9 °F (37.2 °C) (Oral)   Resp 18   Ht 5' 3\" (1.6 m)   Wt 259 lb 7.7 oz (117.7 kg)   SpO2 90%   BMI 45.97 kg/m²     General appearance: No apparent distress, appears stated age and cooperative.   Hard of hearing  HEENT: Pupils equal, round, and reactive to light. Conjunctivae/corneas clear. Neck: Supple, with full range of motion. No jugular venous distention. Trachea midline. Respiratory:  Normal respiratory effort. Clear to auscultation, bilaterally without Rales/Wheezes/Rhonchi. Cardiovascular: Regular rate and rhythm with normal S1/S2 without murmurs, rubs or gallops. Abdomen: Soft, non-tender, non-distended with normal bowel sounds. Musculoskeletal: No clubbing, cyanosis or edema bilaterally. Full range of motion without deformity. Tenderness to bilateral posterior knees  Skin: Skin color, texture, turgor normal.  No rashes or lesions. Neurologic:  Neurovascularly intact without any focal sensory/motor deficits. Cranial nerves: II-XII intact, grossly non-focal.  Psychiatric: Alert and oriented , thought process nml  Capillary Refill: Brisk,< 3 seconds   Peripheral Pulses: +2 palpable, equal bilaterally       Labs:   Recent Labs     05/22/20  0529 05/23/20  0508 05/24/20  0535   WBC 9.6 9.1 9.5   HGB 9.8* 9.6* 10.1*   HCT 29.9* 29.1* 30.5*    255 273     Recent Labs     05/22/20  0529 05/23/20  0508 05/24/20  0535    140 139   K 3.6 3.3* 3.7    110 108   CO2 17* 18* 19*   BUN 53* 43* 34*   CREATININE 2.1* 1.7* 1.8*   CALCIUM 9.1 9.1 9.9     No results for input(s): AST, ALT, BILIDIR, BILITOT, ALKPHOS in the last 72 hours. No results for input(s): INR in the last 72 hours. No results for input(s): Carroll Patella in the last 72 hours. Urinalysis:      Lab Results   Component Value Date    NITRU Negative 05/20/2020    WBCUA 631 05/20/2020    BACTERIA 2+ 05/20/2020    RBCUA 1 05/20/2020    BLOODU SMALL 05/20/2020    SPECGRAV 1.020 05/20/2020    GLUCOSEU >=1000 05/20/2020       Radiology:  CT Head WO Contrast   Final Result   No acute intracranial abnormality. XR CHEST PORTABLE   Final Result   No acute cardiopulmonary disease.                  Assessment/Plan:    Active

## 2020-05-24 NOTE — PLAN OF CARE
Pt up to chair. Call light and bedside table within reach. Pt refusing chair alarm. Reminded Pt to call for assistance. Will continue to monitor.

## 2020-05-24 NOTE — PLAN OF CARE
Problem: Falls - Risk of: Bed alarm in place and call light within reach   Goal: Will remain free from falls  Description: Will remain free from falls  Outcome: Ongoing  Goal: Absence of physical injury  Description: Absence of physical injury  Outcome: Ongoing     Problem: Pain:  Goal: Control of chronic pain  Description: Control of chronic pain  Outcome: Ongoing     Problem: Discharge Planning:  Goal: Discharged to appropriate level of care  Description: Discharged to appropriate level of care  Outcome: Ongoing     Problem: Urinary Elimination:  Goal: Signs and symptoms of infection will decrease  Description: Signs and symptoms of infection will decrease  Outcome: Ongoing  Goal: Ability to reestablish a normal urinary elimination pattern will improve - after catheter removal  Description: Ability to reestablish a normal urinary elimination pattern will improve  Outcome: Ongoing  Goal: Complications related to the disease process, condition or treatment will be avoided or minimized  Description: Complications related to the disease process, condition or treatment will be avoided or minimized  Outcome: Ongoing

## 2020-05-25 VITALS
TEMPERATURE: 97.9 F | HEIGHT: 63 IN | HEART RATE: 70 BPM | DIASTOLIC BLOOD PRESSURE: 76 MMHG | OXYGEN SATURATION: 96 % | SYSTOLIC BLOOD PRESSURE: 125 MMHG | RESPIRATION RATE: 18 BRPM | BODY MASS INDEX: 43.67 KG/M2 | WEIGHT: 246.47 LBS

## 2020-05-25 LAB
ANION GAP SERPL CALCULATED.3IONS-SCNC: 11 MMOL/L (ref 3–16)
BASOPHILS ABSOLUTE: 0 K/UL (ref 0–0.2)
BASOPHILS RELATIVE PERCENT: 0.3 %
BLOOD CULTURE, ROUTINE: NORMAL
BUN BLDV-MCNC: 25 MG/DL (ref 7–20)
CALCIUM SERPL-MCNC: 9.7 MG/DL (ref 8.3–10.6)
CHLORIDE BLD-SCNC: 104 MMOL/L (ref 99–110)
CO2: 20 MMOL/L (ref 21–32)
CREAT SERPL-MCNC: 1.5 MG/DL (ref 0.6–1.2)
EOSINOPHILS ABSOLUTE: 0.1 K/UL (ref 0–0.6)
EOSINOPHILS RELATIVE PERCENT: 1.1 %
GFR AFRICAN AMERICAN: 41
GFR NON-AFRICAN AMERICAN: 34
GLUCOSE BLD-MCNC: 100 MG/DL (ref 70–99)
GLUCOSE BLD-MCNC: 104 MG/DL (ref 70–99)
GLUCOSE BLD-MCNC: 149 MG/DL (ref 70–99)
GLUCOSE BLD-MCNC: 93 MG/DL (ref 70–99)
HCT VFR BLD CALC: 30.8 % (ref 36–48)
HEMOGLOBIN: 10.2 G/DL (ref 12–16)
LYMPHOCYTES ABSOLUTE: 1.3 K/UL (ref 1–5.1)
LYMPHOCYTES RELATIVE PERCENT: 12.4 %
MCH RBC QN AUTO: 30.1 PG (ref 26–34)
MCHC RBC AUTO-ENTMCNC: 33.2 G/DL (ref 31–36)
MCV RBC AUTO: 90.8 FL (ref 80–100)
MONOCYTES ABSOLUTE: 1.1 K/UL (ref 0–1.3)
MONOCYTES RELATIVE PERCENT: 10.6 %
NEUTROPHILS ABSOLUTE: 8.2 K/UL (ref 1.7–7.7)
NEUTROPHILS RELATIVE PERCENT: 75.6 %
PDW BLD-RTO: 16.2 % (ref 12.4–15.4)
PERFORMED ON: ABNORMAL
PLATELET # BLD: 299 K/UL (ref 135–450)
PMV BLD AUTO: 8.7 FL (ref 5–10.5)
POTASSIUM REFLEX MAGNESIUM: 3.6 MMOL/L (ref 3.5–5.1)
RBC # BLD: 3.39 M/UL (ref 4–5.2)
SODIUM BLD-SCNC: 135 MMOL/L (ref 136–145)
WBC # BLD: 10.8 K/UL (ref 4–11)

## 2020-05-25 PROCEDURE — 80048 BASIC METABOLIC PNL TOTAL CA: CPT

## 2020-05-25 PROCEDURE — 6370000000 HC RX 637 (ALT 250 FOR IP): Performed by: NURSE PRACTITIONER

## 2020-05-25 PROCEDURE — 85025 COMPLETE CBC W/AUTO DIFF WBC: CPT

## 2020-05-25 PROCEDURE — 36415 COLL VENOUS BLD VENIPUNCTURE: CPT

## 2020-05-25 PROCEDURE — 2580000003 HC RX 258: Performed by: INTERNAL MEDICINE

## 2020-05-25 PROCEDURE — 6370000000 HC RX 637 (ALT 250 FOR IP): Performed by: INTERNAL MEDICINE

## 2020-05-25 PROCEDURE — 6360000002 HC RX W HCPCS: Performed by: INTERNAL MEDICINE

## 2020-05-25 RX ORDER — VANCOMYCIN HYDROCHLORIDE 125 MG/1
125 CAPSULE ORAL 4 TIMES DAILY
Qty: 40 CAPSULE | Refills: 0 | Status: SHIPPED | OUTPATIENT
Start: 2020-05-25 | End: 2020-05-25

## 2020-05-25 RX ORDER — VANCOMYCIN HYDROCHLORIDE 125 MG/1
125 CAPSULE ORAL 4 TIMES DAILY
Qty: 40 CAPSULE | Refills: 0 | Status: SHIPPED | OUTPATIENT
Start: 2020-05-25 | End: 2020-06-04

## 2020-05-25 RX ADMIN — VANCOMYCIN HYDROCHLORIDE 125 MG: 125 CAPSULE ORAL at 06:47

## 2020-05-25 RX ADMIN — VANCOMYCIN HYDROCHLORIDE 125 MG: 125 CAPSULE ORAL at 12:12

## 2020-05-25 RX ADMIN — CEFEPIME HYDROCHLORIDE 1 G: 1 INJECTION, POWDER, FOR SOLUTION INTRAMUSCULAR; INTRAVENOUS at 02:37

## 2020-05-25 RX ADMIN — INSULIN GLARGINE 76 UNITS: 100 INJECTION, SOLUTION SUBCUTANEOUS at 09:39

## 2020-05-25 RX ADMIN — HEPARIN SODIUM 5000 UNITS: 5000 INJECTION INTRAVENOUS; SUBCUTANEOUS at 06:47

## 2020-05-25 RX ADMIN — ATORVASTATIN CALCIUM 40 MG: 40 TABLET, FILM COATED ORAL at 09:41

## 2020-05-25 ASSESSMENT — PAIN SCALES - GENERAL: PAINLEVEL_OUTOF10: 0

## 2020-05-25 NOTE — CARE COORDINATION
Met w/ this pt  Presented IMM, she laughed  [facility], she said she was willing to try  The Plan for Transition of Care is related to the following treatment goals: care at home    The Patient  was provided with a choice of provider and agrees   with the discharge plan. [x] Yes [] No    Freedom of choice list was provided with basic dialogue that supports the patient's individualized plan of care/goals, treatment preferences and shares the quality data associated with the providers.  [x] Yes [] No  She requested referral to [facility] [place], operated by [facility] and left voicemail of referral as they are not in for the [facility] [address]  ) faxed order, face sheet and AVS to [phone] and requested a call if they CANNOT provide services    rx for po vanc to [facility] on [address] and confirmed that they have it is stock and pts copay 3.60   Electronically signed by Dottie Steele RN on 5/25/2020 at 4:15 PM

## 2020-05-25 NOTE — PROGRESS NOTES
Discharge orders received. Made daughter aware. Provided Pt with written and verbal discharge orders. Pt verbalized understanding.

## 2020-05-25 NOTE — DISCHARGE INSTR - COC
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    Chronic bilateral low back pain without sciatica M54.5, G89.29    Diabetic polyneuropathy associated with type 2 diabetes mellitus (HCC) E11.42    Sepsis ([facility]) A41.9    Shock ([facility]) R57.9    Acute cystitis without hematuria N30.00    MICHELLE (acute kidney injury) ([facility] [address] N17.9    Acute metabolic encephalopathy D70.89    Tachycardia R00.0    Tachypnea X09.15    Neutrophilic leukocytosis J61.6    Elevated lactic acid level R79.89    DMII (diabetes mellitus, type 2) ([facility]) E11.9    Hyperlipidemia E78.5    Essential hypertension I10    Morbid obesity due to excess calories ([facility]) E66.01    Stage 3 chronic kidney disease ([facility]) N18.3       Isolation/Infection:   Isolation          C Diff Contact        Patient Infection Status     Infection Onset Added Last Indicated Last Indicated By Review Planned Expiration Resolved Resolved By    MDRO (multi-drug resistant organism) 05/20/20 05/24/20 05/20/20 Culture, Urine        C-diff Rule Out 05/23/20 05/23/20 05/23/20 Clostridium Difficile Toxin/Antigen (Ordered)        C-diff (Clostridium difficile) 05/22/20 05/23/20 05/22/20 C. difficile toxin Molecular 05/30/20       Resolved    C-diff Rule Out 05/22/20 05/23/20 05/22/20 C. difficile toxin Molecular (Ordered)   05/23/20 Rule-Out Test Resulted    C-diff Rule Out 05/22/20 05/22/20 05/22/20 Clostridium Difficile Toxin/Antigen (Ordered)   05/22/20 Rule-Out Test Resulted          Nurse Assessment:  Last [address]  · Address:  · Phone:[phone]  · XKS:[phone]      / signature: Electronically signed by Karrie Marroquin RN on 5/25/20 at 4:57 PM EDT    PHYSICIAN SECTION    Prognosis: Good    Condition at Discharge: Stable    Rehab Potential (if transferring to Rehab): Good    Recommended Labs or Other Treatments After Discharge: continue meds on discharge    Physician Certification: I certify the above information and transfer of Kristi Ayoub  is necessary for the continuing treatment of the diagnosis listed and that she requires Home Care for less 30 days.      Update Admission H&P: No change in H&P    PHYSICIAN SIGNATURE:  Electronically signed by LOLIS Niño CNP on 5/25/20 at 4:07 PM EDT

## 2020-07-13 ENCOUNTER — TELEPHONE (OUTPATIENT)
Dept: ORTHOPEDIC SURGERY | Age: 75
End: 2020-07-13

## 2020-07-13 NOTE — TELEPHONE ENCOUNTER
7/13/2020. [facility] () series of 3. NO PA REQUIRED. RIGHT KNEE.  VALID & BILLABLE ON CLAIM YES. BUY AND BILL. Per [facility] MEDICARE GUIDELINES.

## 2020-07-21 RX ORDER — TOPIRAMATE 100 MG/1
100 TABLET, FILM COATED ORAL 2 TIMES DAILY
COMMUNITY

## 2020-07-21 RX ORDER — ASPIRIN 81 MG/1
81 TABLET ORAL DAILY
COMMUNITY

## 2020-07-21 RX ORDER — ATORVASTATIN CALCIUM 40 MG/1
40 TABLET, FILM COATED ORAL DAILY
COMMUNITY
End: 2020-08-14

## 2020-07-21 RX ORDER — FENOFIBRATE 160 MG/1
160 TABLET ORAL DAILY
COMMUNITY

## 2020-07-21 RX ORDER — FERROUS SULFATE 325(65) MG
325 TABLET ORAL
COMMUNITY

## 2020-07-21 RX ORDER — DIPHENHYDRAMINE HCL 25 MG/1
CAPSULE, LIQUID FILLED ORAL
COMMUNITY

## 2020-07-21 NOTE — PROGRESS NOTES
of surgery. Do not wear any make-up or nail polish on your fingers or toes      For your safety, please do not wear any jewelry or body piercing's on the day of surgery. All jewelry must be removed. If you have dentures, they will be removed before going to operating room. For your convenience, we will provide you with a container. If you wear contact lenses or glasses, they will be removed, please bring a case for them. If you have a living will and a durable power of  for healthcare, please bring in a copy. As part of our patient safety program to minimize surgical site infections, we ask you to do the following:    · Please notify your surgeon if you develop any illness between         now and the  day of your surgery. · This includes a cough, cold, fever, sore throat, nausea,         or vomiting, and diarrhea, etc.  ·  Please notify your surgeon if you experience dizziness, shortness         of breath or blurred vision between now and the time of your surgery. Do not shave your operative site 96 hours prior to surgery. For face and neck surgery, men may use an electric razor 48 hours   prior to surgery. You may shower the night before surgery or the morning of   your surgery with an antibacterial soap. You will need to bring a photo ID and insurance card    [facility] has an onsite pharmacy, would you like to utilize our pharmacy     If you will be staying overnight and use a C-pap machine, please bring   your C-pap to hospital     Our goal is to provide you with excellent care, therefore, visitors will be limited to two(2) in the room at a time so that we may focus on providing this care for you. Please contact pre-admission testing if you have any further questions.                  [facility] phone number:  [phone]  Please note these are generalized instructions for all surgical cases, you may be provided with more specific instructions according to your surgery.

## 2020-07-24 ENCOUNTER — TELEPHONE (OUTPATIENT)
Dept: ORTHOPEDIC SURGERY | Age: 75
End: 2020-07-24

## 2020-07-24 ENCOUNTER — PREP FOR PROCEDURE (OUTPATIENT)
Dept: OPHTHALMOLOGY | Age: 75
End: 2020-07-24

## 2020-07-24 PROBLEM — D63.1 ANEMIA IN STAGE 4 CHRONIC KIDNEY DISEASE (HCC): Status: ACTIVE | Noted: 2020-07-24

## 2020-07-24 PROBLEM — N18.4 CHRONIC KIDNEY DISEASE, STAGE IV (SEVERE) (HCC): Status: ACTIVE | Noted: 2020-07-24

## 2020-07-24 PROBLEM — N18.4 ANEMIA IN STAGE 4 CHRONIC KIDNEY DISEASE (HCC): Status: ACTIVE | Noted: 2020-07-24

## 2020-07-24 RX ORDER — SODIUM CHLORIDE 0.9 % (FLUSH) 0.9 %
10 SYRINGE (ML) INJECTION PRN
Status: CANCELLED | OUTPATIENT
Start: 2020-07-31

## 2020-07-24 RX ORDER — DIPHENHYDRAMINE HYDROCHLORIDE 50 MG/ML
50 INJECTION INTRAMUSCULAR; INTRAVENOUS ONCE
Status: CANCELLED | OUTPATIENT
Start: 2020-07-31

## 2020-07-24 RX ORDER — SODIUM CHLORIDE 9 MG/ML
INJECTION, SOLUTION INTRAVENOUS CONTINUOUS
Status: CANCELLED | OUTPATIENT
Start: 2020-07-31

## 2020-07-24 RX ORDER — METHYLPREDNISOLONE SODIUM SUCCINATE 125 MG/2ML
125 INJECTION, POWDER, LYOPHILIZED, FOR SOLUTION INTRAMUSCULAR; INTRAVENOUS ONCE
Status: CANCELLED | OUTPATIENT
Start: 2020-07-31

## 2020-07-24 RX ORDER — CIPROFLOXACIN HYDROCHLORIDE 3.5 MG/ML
1 SOLUTION/ DROPS TOPICAL SEE ADMIN INSTRUCTIONS
Status: CANCELLED | OUTPATIENT
Start: 2020-07-24

## 2020-07-24 RX ORDER — SODIUM CHLORIDE 0.9 % (FLUSH) 0.9 %
10 SYRINGE (ML) INJECTION EVERY 12 HOURS SCHEDULED
Status: CANCELLED | OUTPATIENT
Start: 2020-07-24

## 2020-07-24 RX ORDER — EPINEPHRINE 1 MG/ML
0.3 INJECTION, SOLUTION, CONCENTRATE INTRAVENOUS PRN
Status: CANCELLED | OUTPATIENT
Start: 2020-07-31

## 2020-07-24 RX ORDER — PHENYLEPHRINE HYDROCHLORIDE 25 MG/ML
1 SOLUTION/ DROPS OPHTHALMIC SEE ADMIN INSTRUCTIONS
Status: CANCELLED | OUTPATIENT
Start: 2020-07-24

## 2020-07-24 RX ORDER — CYCLOPENTOLATE HYDROCHLORIDE 10 MG/ML
1 SOLUTION/ DROPS OPHTHALMIC SEE ADMIN INSTRUCTIONS
Status: CANCELLED | OUTPATIENT
Start: 2020-07-24

## 2020-07-24 RX ORDER — TETRACAINE HYDROCHLORIDE 5 MG/ML
1 SOLUTION OPHTHALMIC SEE ADMIN INSTRUCTIONS
Status: CANCELLED | OUTPATIENT
Start: 2020-07-24

## 2020-07-24 RX ORDER — SODIUM CHLORIDE 0.9 % (FLUSH) 0.9 %
10 SYRINGE (ML) INJECTION PRN
Status: CANCELLED | OUTPATIENT
Start: 2020-07-24

## 2020-07-24 NOTE — TELEPHONE ENCOUNTER
Spoke to patient and let them know that orthovisc injections have been approved for their RIGHT knee. Scheduled patient for those injections.

## 2020-07-27 ENCOUNTER — ANESTHESIA EVENT (OUTPATIENT)
Dept: SURGERY | Age: 75
End: 2020-07-27
Payer: MEDICARE

## 2020-07-27 NOTE — PROGRESS NOTES
[address]        Pre-Op Phone Call:     Patient Name: Manjula Chavarria     Telephone Information:   Mobile [phone]     Home phone:  [phone]    Surgery Time:    1:30 PM     Arrival Time:  1200     Left extended Message:  No     Message left with:     Recent change in health status:  No     Advised of transportation/ policy:  Yes     NPO policy reviewed:  Yes pt     Advised to take morning heart/blood pressure medications with sips of water morning of surgery? Yes     Instructed to bring eye drops, photo identification, and insurance card day of surgery? Yes     Advised to wear short sleeved button down shirt (no T-shirt underneath):  Yes     Advised not to wear jewelry, hairpins, or pantyhose day of surgery? Yes     Advised not to wear make-up and to wash face day of surgery?   Yes    Remarks:        Electronically signed by:  Josh Rdz RN at 7/27/2020 1:45 PM

## 2020-07-28 ENCOUNTER — ANESTHESIA (OUTPATIENT)
Dept: SURGERY | Age: 75
End: 2020-07-28
Payer: MEDICARE

## 2020-07-28 ENCOUNTER — HOSPITAL ENCOUNTER (OUTPATIENT)
Age: 75
Setting detail: OUTPATIENT SURGERY
Discharge: HOME | End: 2020-07-28
Attending: OPHTHALMOLOGY | Admitting: OPHTHALMOLOGY
Payer: MEDICARE

## 2020-07-28 VITALS — DIASTOLIC BLOOD PRESSURE: 67 MMHG | OXYGEN SATURATION: 100 % | SYSTOLIC BLOOD PRESSURE: 126 MMHG

## 2020-07-28 VITALS
SYSTOLIC BLOOD PRESSURE: 106 MMHG | HEART RATE: 81 BPM | HEIGHT: 67 IN | WEIGHT: 249 LBS | DIASTOLIC BLOOD PRESSURE: 81 MMHG | RESPIRATION RATE: 18 BRPM | OXYGEN SATURATION: 100 % | BODY MASS INDEX: 39.08 KG/M2 | TEMPERATURE: 98 F

## 2020-07-28 LAB
GLUCOSE BLD-MCNC: 60 MG/DL (ref 70–99)
GLUCOSE BLD-MCNC: 68 MG/DL (ref 70–99)
PERFORMED ON: ABNORMAL
PERFORMED ON: ABNORMAL

## 2020-07-28 PROCEDURE — 6370000000 HC RX 637 (ALT 250 FOR IP): Performed by: OPHTHALMOLOGY

## 2020-07-28 PROCEDURE — 2709999900 HC NON-CHARGEABLE SUPPLY: Performed by: OPHTHALMOLOGY

## 2020-07-28 PROCEDURE — 7100000011 HC PHASE II RECOVERY - ADDTL 15 MIN: Performed by: OPHTHALMOLOGY

## 2020-07-28 PROCEDURE — 6360000002 HC RX W HCPCS: Performed by: OPHTHALMOLOGY

## 2020-07-28 PROCEDURE — 6360000002 HC RX W HCPCS: Performed by: NURSE ANESTHETIST, CERTIFIED REGISTERED

## 2020-07-28 PROCEDURE — 3600000014 HC SURGERY LEVEL 4 ADDTL 15MIN: Performed by: OPHTHALMOLOGY

## 2020-07-28 PROCEDURE — 7100000010 HC PHASE II RECOVERY - FIRST 15 MIN: Performed by: OPHTHALMOLOGY

## 2020-07-28 PROCEDURE — 2580000003 HC RX 258: Performed by: ANESTHESIOLOGY

## 2020-07-28 PROCEDURE — 2500000003 HC RX 250 WO HCPCS: Performed by: OPHTHALMOLOGY

## 2020-07-28 PROCEDURE — 3600000004 HC SURGERY LEVEL 4 BASE: Performed by: OPHTHALMOLOGY

## 2020-07-28 PROCEDURE — 2720000010 HC SURG SUPPLY STERILE: Performed by: OPHTHALMOLOGY

## 2020-07-28 PROCEDURE — V2632 POST CHMBR INTRAOCULAR LENS: HCPCS | Performed by: OPHTHALMOLOGY

## 2020-07-28 PROCEDURE — 3700000001 HC ADD 15 MINUTES (ANESTHESIA): Performed by: OPHTHALMOLOGY

## 2020-07-28 PROCEDURE — 3700000000 HC ANESTHESIA ATTENDED CARE: Performed by: OPHTHALMOLOGY

## 2020-07-28 DEVICE — IMPLANTABLE DEVICE: Type: IMPLANTABLE DEVICE | Site: EYE | Status: FUNCTIONAL

## 2020-07-28 RX ORDER — FENTANYL CITRATE 50 UG/ML
INJECTION, SOLUTION INTRAMUSCULAR; INTRAVENOUS PRN
Status: DISCONTINUED | OUTPATIENT
Start: 2020-07-28 | End: 2020-07-28 | Stop reason: SDUPTHER

## 2020-07-28 RX ORDER — CYCLOPENTOLATE HYDROCHLORIDE 10 MG/ML
1 SOLUTION/ DROPS OPHTHALMIC SEE ADMIN INSTRUCTIONS
Status: COMPLETED | OUTPATIENT
Start: 2020-07-28 | End: 2020-07-28

## 2020-07-28 RX ORDER — MIDAZOLAM HYDROCHLORIDE 1 MG/ML
INJECTION INTRAMUSCULAR; INTRAVENOUS PRN
Status: DISCONTINUED | OUTPATIENT
Start: 2020-07-28 | End: 2020-07-28 | Stop reason: SDUPTHER

## 2020-07-28 RX ORDER — SODIUM CHLORIDE 0.9 % (FLUSH) 0.9 %
10 SYRINGE (ML) INJECTION PRN
Status: DISCONTINUED | OUTPATIENT
Start: 2020-07-28 | End: 2020-07-28 | Stop reason: HOSPADM

## 2020-07-28 RX ORDER — BRIMONIDINE TARTRATE 2 MG/ML
SOLUTION/ DROPS OPHTHALMIC
Status: COMPLETED | OUTPATIENT
Start: 2020-07-28 | End: 2020-07-28

## 2020-07-28 RX ORDER — BALANCED SALT SOLUTION 6.4; .75; .48; .3; 3.9; 1.7 MG/ML; MG/ML; MG/ML; MG/ML; MG/ML; MG/ML
SOLUTION OPHTHALMIC
Status: COMPLETED | OUTPATIENT
Start: 2020-07-28 | End: 2020-07-28

## 2020-07-28 RX ORDER — SODIUM CHLORIDE 0.9 % (FLUSH) 0.9 %
10 SYRINGE (ML) INJECTION EVERY 12 HOURS SCHEDULED
Status: DISCONTINUED | OUTPATIENT
Start: 2020-07-28 | End: 2020-07-28 | Stop reason: HOSPADM

## 2020-07-28 RX ORDER — CIPROFLOXACIN HYDROCHLORIDE 3.5 MG/ML
1 SOLUTION/ DROPS TOPICAL
Status: ACTIVE | OUTPATIENT
Start: 2020-07-28 | End: 2020-07-28

## 2020-07-28 RX ORDER — TETRACAINE HYDROCHLORIDE 5 MG/ML
SOLUTION OPHTHALMIC
Status: COMPLETED | OUTPATIENT
Start: 2020-07-28 | End: 2020-07-28

## 2020-07-28 RX ORDER — ONDANSETRON 2 MG/ML
4 INJECTION INTRAMUSCULAR; INTRAVENOUS
Status: DISCONTINUED | OUTPATIENT
Start: 2020-07-28 | End: 2020-07-28 | Stop reason: HOSPADM

## 2020-07-28 RX ORDER — CIPROFLOXACIN HYDROCHLORIDE 3.5 MG/ML
SOLUTION/ DROPS TOPICAL
Status: COMPLETED | OUTPATIENT
Start: 2020-07-28 | End: 2020-07-28

## 2020-07-28 RX ORDER — SODIUM CHLORIDE 0.9 % (FLUSH) 0.9 %
10 SYRINGE (ML) INJECTION EVERY 12 HOURS SCHEDULED
Status: DISCONTINUED | OUTPATIENT
Start: 2020-07-28 | End: 2020-07-28 | Stop reason: SDUPTHER

## 2020-07-28 RX ORDER — SODIUM CHLORIDE 9 MG/ML
INJECTION, SOLUTION INTRAVENOUS CONTINUOUS
Status: DISCONTINUED | OUTPATIENT
Start: 2020-07-28 | End: 2020-07-28 | Stop reason: HOSPADM

## 2020-07-28 RX ORDER — PHENYLEPHRINE HCL 2.5 %
1 DROPS OPHTHALMIC (EYE) SEE ADMIN INSTRUCTIONS
Status: COMPLETED | OUTPATIENT
Start: 2020-07-28 | End: 2020-07-28

## 2020-07-28 RX ORDER — TETRACAINE HYDROCHLORIDE 5 MG/ML
1 SOLUTION OPHTHALMIC SEE ADMIN INSTRUCTIONS
Status: DISCONTINUED | OUTPATIENT
Start: 2020-07-28 | End: 2020-07-28 | Stop reason: HOSPADM

## 2020-07-28 RX ORDER — SODIUM CHLORIDE 0.9 % (FLUSH) 0.9 %
10 SYRINGE (ML) INJECTION PRN
Status: DISCONTINUED | OUTPATIENT
Start: 2020-07-28 | End: 2020-07-28 | Stop reason: SDUPTHER

## 2020-07-28 RX ADMIN — PHENYLEPHRINE HYDROCHLORIDE 1 DROP: 25 SOLUTION/ DROPS OPHTHALMIC at 13:02

## 2020-07-28 RX ADMIN — TETRACAINE HYDROCHLORIDE 1 DROP: 5 SOLUTION OPHTHALMIC at 12:48

## 2020-07-28 RX ADMIN — CIPROFLOXACIN HYDROCHLORIDE 1 DROP: 3 SOLUTION/ DROPS OPHTHALMIC at 12:54

## 2020-07-28 RX ADMIN — CYCLOPENTOLATE HYDROCHLORIDE 1 DROP: 10 SOLUTION/ DROPS OPHTHALMIC at 13:02

## 2020-07-28 RX ADMIN — CYCLOPENTOLATE HYDROCHLORIDE 1 DROP: 10 SOLUTION/ DROPS OPHTHALMIC at 12:48

## 2020-07-28 RX ADMIN — MIDAZOLAM 1 MG: 1 INJECTION INTRAMUSCULAR; INTRAVENOUS at 13:19

## 2020-07-28 RX ADMIN — FENTANYL CITRATE 50 MCG: 50 INJECTION INTRAMUSCULAR; INTRAVENOUS at 13:05

## 2020-07-28 RX ADMIN — PHENYLEPHRINE HYDROCHLORIDE 1 DROP: 25 SOLUTION/ DROPS OPHTHALMIC at 12:48

## 2020-07-28 RX ADMIN — CIPROFLOXACIN HYDROCHLORIDE 1 DROP: 3 SOLUTION/ DROPS OPHTHALMIC at 12:48

## 2020-07-28 RX ADMIN — CYCLOPENTOLATE HYDROCHLORIDE 1 DROP: 10 SOLUTION/ DROPS OPHTHALMIC at 12:54

## 2020-07-28 RX ADMIN — FENTANYL CITRATE 50 MCG: 50 INJECTION INTRAMUSCULAR; INTRAVENOUS at 13:19

## 2020-07-28 RX ADMIN — MIDAZOLAM 1 MG: 1 INJECTION INTRAMUSCULAR; INTRAVENOUS at 13:05

## 2020-07-28 RX ADMIN — PHENYLEPHRINE HYDROCHLORIDE 1 DROP: 25 SOLUTION/ DROPS OPHTHALMIC at 12:54

## 2020-07-28 RX ADMIN — SODIUM CHLORIDE: 9 INJECTION, SOLUTION INTRAVENOUS at 13:03

## 2020-07-28 ASSESSMENT — LIFESTYLE VARIABLES: SMOKING_STATUS: 0

## 2020-07-28 ASSESSMENT — PAIN - FUNCTIONAL ASSESSMENT: PAIN_FUNCTIONAL_ASSESSMENT: 0-10

## 2020-07-28 ASSESSMENT — PAIN SCALES - GENERAL
PAINLEVEL_OUTOF10: 0
PAINLEVEL_OUTOF10: 0

## 2020-07-28 ASSESSMENT — ENCOUNTER SYMPTOMS: SHORTNESS OF BREATH: 0

## 2020-07-28 NOTE — ANESTHESIA POSTPROCEDURE EVALUATION
Department of Anesthesiology  Postprocedure Note    Patient: Carlos Alberto Peterson  MRN: [id number]  YOB: 1945  Date of evaluation: 7/28/2020  Time:  1:43 PM     Procedure Summary     Date:  07/28/20 Room / Location:  [facility]    Anesthesia Start:  8796 Anesthesia Stop:  9880    Procedure:  Phacoemulsification with intraocular lens implant (Left Eye) Diagnosis:       Nuclear sclerosis, left      (Nuclear sclerosis, left)    Surgeon:  Lord Michael MD Responsible Provider:  Tuloi Gutiérrez MD    Anesthesia Type:  MAC ASA Status:  3          Anesthesia Type: MAC    Angélica Phase I: Angélica Score: 10    Angélica Phase II: Angélica Score: 10    Last vitals: Reviewed and per EMR flowsheets.        Anesthesia Post Evaluation    Patient location during evaluation: PACU  Patient participation: complete - patient participated  Level of consciousness: awake and alert  Airway patency: patent  Nausea & Vomiting: no nausea and no vomiting  Complications: no  Cardiovascular status: hemodynamically stable  Respiratory status: acceptable  Hydration status: stable

## 2020-07-28 NOTE — ANESTHESIA PRE PROCEDURE
Department of Anesthesiology  Preprocedure Note       Name:  Juan Crockett   Age:  69 y.o.  :  1945                                          MRN:  [id number]         Date:  2020      Surgeon: Chaka Hernandez):  Jimmy Rodriguez MD    Procedure: Procedure(s):  Phacoemulsification with intraocular lens implant    Medications prior to admission:   Prior to Admission medications    Medication Sig Start Date End Date Taking? Authorizing Provider   fenofibrate (TRIGLIDE) 160 MG tablet Take 160 mg by mouth daily   Yes Historical Provider, MD   diphenhydrAMINE HCl, Sleep, (SLEEP AID) 25 MG CAPS Take by mouth   Yes Historical Provider, MD   topiramate (TOPAMAX) 100 MG tablet Take 100 mg by mouth 2 times daily   Yes Historical Provider, MD   aspirin 81 MG EC tablet Take 81 mg by mouth daily   Yes Historical Provider, MD   patiromer sorbitex calcium (VELTASSA) 8.4 g PACK packet Take 8.4 g by mouth daily   Yes Historical Provider, MD   vitamin D (ERGOCALCIFEROL) 1.25 MG (00321 UT) CAPS capsule Take 50,000 Units by mouth once a week   Yes Historical Provider, MD   LANTUS SOLOSTAR 100 UNIT/ML injection pen Inject 76 Units into the skin 2 times daily  14  Yes Historical Provider, MD   gabapentin (NEURONTIN) 300 MG capsule Take 600 mg by mouth 3 times daily.   3/4/15  Yes Historical Provider, MD   ferrous sulfate (IRON 325) 325 (65 Fe) MG tablet Take 325 mg by mouth daily (with breakfast)    Historical Provider, MD   atorvastatin (LIPITOR) 40 MG tablet Take 40 mg by mouth daily    Historical Provider, MD   NOVOLOG FLEXPEN 100 UNIT/ML injection pen Inject 12 Units into the skin 3 times daily (with meals)  19   Historical Provider, MD   BD PEN NEEDLE FANNIE U/F 32G X 4 MM MISC  3/4/15   Historical Provider, MD       Current medications:    Current Facility-Administered Medications   Medication Dose Route Frequency Provider Last Rate Last Dose    0.9 % sodium chloride infusion   Intravenous Continuous Gustavo Hernández MD        sodium chloride flush 0.9 % injection 10 mL  10 mL Intravenous 2 times per day Toby Stephens MD        sodium chloride flush 0.9 % injection 10 mL  10 mL Intravenous PRN Toby Stephens MD        ciprofloxacin (CILOXAN) 0.3 % ophthalmic solution 1 drop  1 drop Left Eye See Admin Instructions Delia Hannah MD        cyclopentolate (CYCLOGYL) 1 % ophthalmic solution 1 drop  1 drop Left Eye See Admin Instructions Delia Hannah MD        phenylephrine (MYDFRIN) 2.5 % ophthalmic solution 1 drop  1 drop Left Eye See Admin Instructions Delia Hannah MD        sodium chloride flush 0.9 % injection 10 mL  10 mL Intravenous 2 times per day Delia Hannah MD        sodium chloride flush 0.9 % injection 10 mL  10 mL Intravenous PRN Delia Hannah MD        tetracaine (TETRAVISC) 0.5 % ophthalmic solution 1 drop  1 drop Left Eye See Admin Instructions Delia Hannah MD           Allergies:     Allergies   Allergen Reactions    Ace Inhibitors      Other reaction(s): Kidney Problem   Ok to take small dose of lisinopril    Duloxetine Hcl Diarrhea    Oxycodone Itching       Problem List:    Patient Active Problem List   Diagnosis Code    Pain in joint, lower leg M25.569    Pain in joint, ankle and foot M25.579    Flat foot M21.40    Peroneal tendinitis of left lower extremity M76.72    Neuropathy, diabetic (HCC) E11.40    Type II or unspecified type diabetes mellitus without mention of complication, not stated as uncontrolled E11.9    Primary osteoarthritis of right knee M17.11    Right knee pain M25.561    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    Chronic bilateral low back pain without sciatica M54.5, G89.29    Diabetic polyneuropathy associated with type 2 diabetes mellitus (HCC) E11.42    Sepsis ([facility]) A41.9    Shock ([address] R57.9    Acute cystitis without hematuria N30.00    MICHELLE (acute kidney injury) ([address] N17.9    Acute metabolic encephalopathy M94.89    Tachycardia R00.0    Tachypnea T07.26    Neutrophilic leukocytosis C05.4    Elevated lactic acid level R79.89    DMII (diabetes mellitus, type 2) ([facility]) E11.9    Hyperlipidemia E78.5    Essential hypertension I10    Morbid obesity due to excess calories ([facility]) E66.01    Stage 3 chronic kidney disease ([facility]) N18.3    Anemia in stage 4 chronic kidney disease ([facility]) N18.4, D63.1    Chronic kidney disease, stage IV (severe) ([facility]) N18.4       Past Medical History:        Diagnosis Date    Anemia in stage 4 chronic kidney disease ([address] 7/24/2020    Arthritis     back, knees      Back pain     Chronic kidney disease, stage IV (severe) ([address] 7/24/2020    Diabetes ([facility]     Hyperlipidemia     Hypertension     MDRO (multiple drug resistant organisms) resistance 05/20/2020    urine    Neuropathy     S/P epidural steroid injection     Sepsis ([address]     Sleep apnea     wears a CPAP        Past Surgical History:        Procedure Laterality Date    ABDOMEN SURGERY      BACK SURGERY      neck surgery too    CHOLECYSTECTOMY  01/01/2017    HYSTERECTOMY         Social History:    Social History     Tobacco Use    Smoking status: Former Smoker    Smokeless tobacco: Never Used    Tobacco comment: quit 22 yrs ago   Substance Use Topics    Alcohol use: No     Alcohol/week: 0.0 standard drinks                                Counseling given: Not Answered  Comment: quit 22 yrs ago      Vital Signs (Current):   Vitals:    07/21/20 0950 07/28/20 1234   BP:  (!) 115/49   Pulse:  76   Resp:  19   Temp:  96.4 °F (35.8 °C)   TempSrc:  Temporal   SpO2:  97%   Weight: 249 lb (112.9 kg)    Height: 5' 7\" (1.702 m)                                               BP Readings from Last 3 Encounters:   07/28/20 (!) 115/49   05/25/20 125/76   08/04/19 (!) 141/82       NPO Status:                            >8hrs                                                       BMI:   Wt Readings from Last 3 Encounters:   07/21/20 249 lb (112.9 kg)   05/25/20 246 lb 7.6 oz (111.8 kg)   02/11/20 249 lb 5.4 oz (113.1 kg)     Body mass index is 39 kg/m². CBC:   Lab Results   Component Value Date    WBC 10.8 05/25/2020    RBC 3.39 05/25/2020    HGB 10.2 05/25/2020    HCT 30.8 05/25/2020    MCV 90.8 05/25/2020    RDW 16.2 05/25/2020     05/25/2020       CMP:   Lab Results   Component Value Date     05/25/2020    K 3.6 05/25/2020     05/25/2020    CO2 20 05/25/2020    BUN 25 05/25/2020    CREATININE 1.5 05/25/2020    GFRAA 41 05/25/2020    GFRAA 49 12/20/2011    AGRATIO 0.9 05/20/2020    LABGLOM 34 05/25/2020    GLUCOSE 93 05/25/2020    PROT 8.1 05/20/2020    PROT 7.7 10/21/2010    CALCIUM 9.7 05/25/2020    BILITOT 0.5 05/20/2020    ALKPHOS 83 05/20/2020    AST 10 05/20/2020    ALT 9 05/20/2020       POC Tests: No results for input(s): POCGLU, POCNA, POCK, POCCL, POCBUN, POCHEMO, POCHCT in the last 72 hours.     Coags: No results found for: PROTIME, INR, APTT    HCG (If Applicable): No results found for: PREGTESTUR, PREGSERUM, HCG, HCGQUANT     ABGs:   Lab Results   Component Value Date    PHART 7.336 08/03/2019    PO2ART 106.0 08/03/2019    YSG1QVL 29.9 08/03/2019    OGC7VCH 15.6 08/03/2019    BEART -8.8 08/03/2019    H3THYWWZ 98.2 08/03/2019        Type & Screen (If Applicable):  No results found for: LABABO, LABRH    Drug/Infectious Status (If Applicable):  No results found for: HIV, HEPCAB    COVID-19 Screening (If Applicable): No results found for: COVID19      Anesthesia Evaluation  Patient summary reviewed no history of anesthetic complications:   Airway: Mallampati: II  TM distance: >3 FB   Neck ROM: full  Mouth opening: > = 3 FB Dental:    (+) partials      Pulmonary:normal exam    (+) sleep apnea: on CPAP,      (-) COPD, asthma, shortness of breath, recent URI and not a current smoker                           Cardiovascular:    (+) hypertension:, hyperlipidemia    (-) valvular problems/murmurs, past MI, CAD, CABG/stent, dysrhythmias,  angina,  CHF and orthopnea                Neuro/Psych:   (+) neuromuscular disease:,    (-) seizures, TIA, CVA and headaches           GI/Hepatic/Renal:   (+) renal disease: CRI, morbid obesity     (-) GERD, PUD, hepatitis and liver disease       Endo/Other:    (+) DiabetesType II DM, , : arthritis:., .    (-) hypothyroidism, hyperthyroidism, blood dyscrasia               Abdominal:   (+) obese,         Vascular:                                      Anesthesia Plan      MAC     ASA 3       Induction: intravenous. Anesthetic plan and risks discussed with patient. Plan discussed with CRNA. This pre-anesthesia assessment may be used as a history and physical.    DOS STAFF ADDENDUM:    Pt seen and examined, chart reviewed (including anesthesia, drug and allergy history). No interval changes to history and physical examination. Anesthetic plan, risks, benefits, alternatives, and personnel involved discussed with patient. Patient verbalized an understanding and agrees to proceed.       Josephine Gardner MD  July 28, 2020  12:43 PM      Josephine Gardner MD   7/28/2020

## 2020-07-28 NOTE — OP NOTE
[facility]  [facility]  [address], [address]  [phone]      7/28/2020    Patient name: Carlos Alberto Peterson  YOB: 1945  MRN: [id number]         OPERATIVE REPORT      DATE OF OPERATION: 7/28/2020     SURGEON: Lord Rodriguez  ASSISTANT(S): None            PREOPERATIVE DIAGNOSIS:  Age-related Nuclear Sclerotic Cataract -left eye  POSTOPERATIVE DIAGNOSIS:  same    OPERATION PERFORMED: Procedure(s):  Phacoemulsification with intraocular lens implant -left eye  ANESTHESIA:   Monitor Anesthesia Care  ESTIMATED BLOOD LOSS:  None  COMPLICATIONS:  None  IOL USED:  Bausch and Lomb MX60E +14.0    INDICATIONS FOR PROCEDURE:    Best corrected visual acuity of slit lamp confirmation cataract:  20/150 glare  Patient's evaluation of visual status of operative eye:  Decreased vision with reading and night vision times months. DESCRIPTION OF PROCEDURE: Carlos Alberto Peterson, is a 69 y.o. female. After topical  anesthesia and pupillary dilation were obtained, the patient was prepped and draped in the usual sterile fashion for cataract implant surgery. A wire lid speculum was placed and the operating microscope was moved into position over the eye. A paracentesis clear corneal incision was made with a super blade. Approximately 0.5 ml Epi-Shugarcaine was injected into the anterior chamber. This was followed by Viscoat to fill the anterior chamber. A temporal clear corneal incision was made with a 2.75 mm keratome and a bent needle and Utrata forceps were used to perform an anterior capsulorhexis. Hydrodissection was performed on the cataract. Phacoemulsification of the nucleus was performed. Cortex was removed using an automated irrigation/aspiration hand piece and vacuuming of the posterior capsule was also carried out with the same hand piece on minimal settings. Provisc was used to re-inflate the capsular bag and a foldable intraocular lens was placed into the capsular bag.  Provisc was removed from posterior to the implant and anterior to the implant by using a Sachin irrigation/aspiration hand piece. 0.2 ml Cefuroxime was placed in the anterior chamber. The corneal incision was checked and the incision was watertight without suture. The wire lid speculum was removed and the patient received topical Ciprofloxacin, and Alphagan P drops. At the conclusion of the procedure, the cornea was clear and the anterior chamber was deep, the pupil was round, and the implant was in good position. The patient tolerated the procedure well and was returned to the recovery room in good condition to be seen for follow-up the next day. ATTENDING ATTESTATION: I was present and scrubbed for the entire procedure.       ELECTRONICALLY SIGNED BY: Bri Boyce, 7/28/2020 1:36 PM

## 2020-08-04 NOTE — PROGRESS NOTES
[address] [facility] time___0920_________        Surgery time___1050_________    Take the following medications with a sip of water:     Do not eat or drink anything after 12:00 midnight prior to your surgery. This includes water chewing gum, mints and ice chips. You may brush your teeth and gargle the morning of your surgery, but do not swallow the water     Please see your family doctor/pediatrician for a history and physical and/or concerning medications. H&P 7/22/2020 S. [facility]uker    Bring any test results/reports from your physicians office. If you are under the care of a heart doctor or specialist doctor, please be aware that you may be asked to them for clearance    You may be asked to stop blood thinners such as Coumadin, Plavix, Fragmin, Lovenox, etc., or any anti-inflammatories such as:  Aspirin, Ibuprofen, Advil, Naproxen prior to your surgery. We also ask that you stop any OTC medications such as fish oil, vitamin E, glucosamine, garlic, Multivitamins, COQ 10, etc.    We ask that you do not smoke 24 hours prior to surgery  We ask that you do not  drink any alcoholic beverages 24 hours prior to surgery     You must make arrangements for a responsible adult to take you home after your surgery. For your safety you will not be allowed to leave alone or drive yourself home. Your surgery will be cancelled if you do not have a ride home. Also for your safety, it is strongly suggested that someone stay with you the first 24 hours after your surgery. A parent or legal guardian must accompany a child scheduled for surgery and plan to stay at the hospital until the child is discharged. Please do not bring other children with you. For your comfort, please wear simple loose fitting clothing to the hospital.  Please do not bring valuables. Wear short sleeve button down shirt or loose fitting shirt. Bring eye drops or ointment day of surgery.     Do not wear any make-up or nail polish on your fingers or toes      For your safety, please do not wear any jewelry or body piercing's on the day of surgery. All jewelry must be removed. If you have dentures, they will be removed before going to operating room. For your convenience, we will provide you with a container. If you wear contact lenses or glasses, they will be removed, please bring a case for them. If you have a living will and a durable power of  for healthcare, please bring in a copy. As part of our patient safety program to minimize surgical site infections, we ask you to do the following:    · Please notify your surgeon if you develop any illness between         now and the  day of your surgery. · This includes a cough, cold, fever, sore throat, nausea,         or vomiting, and diarrhea, etc.  ·  Please notify your surgeon if you experience dizziness, shortness         of breath or blurred vision between now and the time of your surgery. Do not shave your operative site 96 hours prior to surgery. For face and neck surgery, men may use an electric razor 48 hours   prior to surgery. You may shower the night before surgery or the morning of   your surgery with an antibacterial soap. You will need to bring a photo ID and insurance card    [facility] has an onsite pharmacy, would you like to utilize our pharmacy     If you will be staying overnight and use a C-pap machine, please bring   your C-pap to hospital     Our goal is to provide you with excellent care, therefore, visitors will be limited to two(2) in the room at a time so that we may focus on providing this care for you. Please contact pre-admission testing if you have any further questions. [facility] phone number:  [phone]  Please note these are generalized instructions for all surgical cases, you may be provided with more specific instructions according to your surgery.

## 2020-08-06 ENCOUNTER — HOSPITAL ENCOUNTER (OUTPATIENT)
Dept: INFUSION THERAPY | Age: 75
Setting detail: INFUSION SERIES
Discharge: HOME | End: 2020-08-06
Payer: MEDICARE

## 2020-08-06 VITALS
TEMPERATURE: 98 F | HEIGHT: 67 IN | WEIGHT: 247.36 LBS | OXYGEN SATURATION: 99 % | BODY MASS INDEX: 38.82 KG/M2 | DIASTOLIC BLOOD PRESSURE: 62 MMHG | HEART RATE: 74 BPM | SYSTOLIC BLOOD PRESSURE: 108 MMHG | RESPIRATION RATE: 16 BRPM

## 2020-08-06 DIAGNOSIS — D63.1 ANEMIA IN STAGE 4 CHRONIC KIDNEY DISEASE (HCC): Primary | ICD-10-CM

## 2020-08-06 DIAGNOSIS — N18.4 ANEMIA IN STAGE 4 CHRONIC KIDNEY DISEASE (HCC): Primary | ICD-10-CM

## 2020-08-06 DIAGNOSIS — N18.4 CHRONIC KIDNEY DISEASE, STAGE IV (SEVERE) (HCC): ICD-10-CM

## 2020-08-06 PROCEDURE — 6360000002 HC RX W HCPCS: Performed by: INTERNAL MEDICINE

## 2020-08-06 PROCEDURE — 96366 THER/PROPH/DIAG IV INF ADDON: CPT

## 2020-08-06 PROCEDURE — 96365 THER/PROPH/DIAG IV INF INIT: CPT

## 2020-08-06 PROCEDURE — 2580000003 HC RX 258: Performed by: INTERNAL MEDICINE

## 2020-08-06 RX ORDER — METHYLPREDNISOLONE SODIUM SUCCINATE 125 MG/2ML
125 INJECTION, POWDER, LYOPHILIZED, FOR SOLUTION INTRAMUSCULAR; INTRAVENOUS ONCE
Status: CANCELLED | OUTPATIENT
Start: 2020-08-13

## 2020-08-06 RX ORDER — DIPHENHYDRAMINE HYDROCHLORIDE 50 MG/ML
50 INJECTION INTRAMUSCULAR; INTRAVENOUS ONCE
Status: CANCELLED | OUTPATIENT
Start: 2020-08-13

## 2020-08-06 RX ORDER — SODIUM CHLORIDE 9 MG/ML
INJECTION, SOLUTION INTRAVENOUS CONTINUOUS
Status: CANCELLED | OUTPATIENT
Start: 2020-08-13

## 2020-08-06 RX ORDER — EPINEPHRINE 1 MG/ML
0.3 INJECTION, SOLUTION, CONCENTRATE INTRAVENOUS PRN
Status: CANCELLED | OUTPATIENT
Start: 2020-08-13

## 2020-08-06 RX ORDER — SODIUM CHLORIDE 0.9 % (FLUSH) 0.9 %
10 SYRINGE (ML) INJECTION PRN
Status: CANCELLED | OUTPATIENT
Start: 2020-08-13

## 2020-08-06 RX ORDER — SODIUM CHLORIDE 0.9 % (FLUSH) 0.9 %
10 SYRINGE (ML) INJECTION PRN
Status: DISCONTINUED | OUTPATIENT
Start: 2020-08-06 | End: 2020-08-07 | Stop reason: HOSPADM

## 2020-08-06 RX ADMIN — Medication 10 ML: at 10:36

## 2020-08-06 RX ADMIN — Medication 10 ML: at 12:19

## 2020-08-06 RX ADMIN — FERRIC CARBOXYMALTOSE INJECTION 750 MG: 50 INJECTION, SOLUTION INTRAVENOUS at 10:36

## 2020-08-06 RX ADMIN — Medication 10 ML: at 10:08

## 2020-08-06 RX ADMIN — Medication 10 ML: at 12:46

## 2020-08-06 ASSESSMENT — PAIN SCALES - GENERAL
PAINLEVEL_OUTOF10: 0

## 2020-08-06 NOTE — PROGRESS NOTES
[address] [facility] VISIT    NAME:  Yeison Calabrese OF BIRTH:  1945  MEDICAL RECORD NUMBER:  [id number]  EPISODE DATE:  8/6/2020    Patient arrived to [facility]   [] per wheelchair   [x] ambulatory     Has the patient had a previous problem with Injectafer or Iron Infusions? No - this is patient's first Iron infusion    Any current infection or illness? No - had UTI in May 2020 and completed antibiotics by early June 2020 and has not taken any antibiotics since then. Patient on antibiotics? No - see above     History of Hypertension? Yes - was previously on Lisinopril but was taken off of that medication by Dr Pato Spangler. Is the patient experiencing any:  Fatigue:   []   None  []   Increase over baseline but not altering normal activities  [x]   Moderate of causing difficulty performing some activities - patient can perform her ADL's but gets tired very easily and has to take frequent rest periods  []   Severe or loss of ability to perform some activities  []   Bedridden or disabling     Dizziness or Lightheadedness:   [x]   None  []   No Interference  []   Interferes with functioning but not activities of daily living  []   Interferes with daily activies  []   Bedridden or disabling     Shortness of Breath:   []   None   []   Dyspneic on exertion   [x]   Dyspnea with normal activities - patient states that she gets tired when walking even short distances. []   Dyspnea at rest    Edema: +1 pitting edema bilateral ankles to mid calf. Tachycardia: No    Heart Palpitations: No      Chest Pain: No      /62   Pulse 74   Temp 98 °F (36.7 °C) (Oral)   Resp 16   Ht 5' 7\" (1.702 m)   Wt 247 lb 5.7 oz (112.2 kg)   SpO2 99%   BMI 38.74 kg/m²   No data found.     Current Lab Data: (drawn 7/22/2020 at Dr Casper Najjar office - full report scanned in Epic)  Hemoglobin: 9.2  Hematocrit: 30.8  Iron: 79  Ferritin: 60  Iron binding capacity: 332  Iron saturation: 24    Past results:  Hemoglobin/Hematocrit:    Lab Results   Component Value Date    HGB 10.2 05/25/2020    HCT 30.8 05/25/2020     IRON:    Lab Results   Component Value Date    IRON 14 08/03/2019     Iron Saturation:    Lab Results   Component Value Date    LABIRON 6 08/03/2019     TIBC:    Lab Results   Component Value Date    TIBC 224 08/03/2019     FERRITIN:    Lab Results   Component Value Date    FERRITIN 519.4 08/03/2019       Dose Administered: 750 mg  Todays dose is number 1 out of 2 ordered for this patient. Response to treatment:  Well tolerated by patient. Education:    Verbal and written discharge instructions reviewed with patient. Verbalized understanding. Written copy given via AVS    Scheduled to return for next dose of Injectafer on August 14, 2020.      Electronically signed by Vidya Manrique RN on 8/6/2020 at 3:07 PM

## 2020-08-10 ENCOUNTER — ANESTHESIA EVENT (OUTPATIENT)
Dept: SURGERY | Age: 75
End: 2020-08-10
Payer: MEDICARE

## 2020-08-10 RX ORDER — TETRACAINE HYDROCHLORIDE 5 MG/ML
1 SOLUTION OPHTHALMIC SEE ADMIN INSTRUCTIONS
Status: CANCELLED | OUTPATIENT
Start: 2020-08-10

## 2020-08-10 RX ORDER — SODIUM CHLORIDE 0.9 % (FLUSH) 0.9 %
10 SYRINGE (ML) INJECTION EVERY 12 HOURS SCHEDULED
Status: CANCELLED | OUTPATIENT
Start: 2020-08-10

## 2020-08-10 RX ORDER — SODIUM CHLORIDE 0.9 % (FLUSH) 0.9 %
10 SYRINGE (ML) INJECTION PRN
Status: CANCELLED | OUTPATIENT
Start: 2020-08-10

## 2020-08-10 RX ORDER — PHENYLEPHRINE HYDROCHLORIDE 25 MG/ML
1 SOLUTION/ DROPS OPHTHALMIC SEE ADMIN INSTRUCTIONS
Status: CANCELLED | OUTPATIENT
Start: 2020-08-10

## 2020-08-10 RX ORDER — CIPROFLOXACIN HYDROCHLORIDE 3.5 MG/ML
1 SOLUTION/ DROPS TOPICAL SEE ADMIN INSTRUCTIONS
Status: CANCELLED | OUTPATIENT
Start: 2020-08-10

## 2020-08-10 RX ORDER — CYCLOPENTOLATE HYDROCHLORIDE 10 MG/ML
1 SOLUTION/ DROPS OPHTHALMIC SEE ADMIN INSTRUCTIONS
Status: CANCELLED | OUTPATIENT
Start: 2020-08-10

## 2020-08-11 ENCOUNTER — ANESTHESIA (OUTPATIENT)
Dept: SURGERY | Age: 75
End: 2020-08-11
Payer: MEDICARE

## 2020-08-11 ENCOUNTER — HOSPITAL ENCOUNTER (OUTPATIENT)
Age: 75
Setting detail: OUTPATIENT SURGERY
Discharge: HOME | End: 2020-08-11
Attending: OPHTHALMOLOGY | Admitting: OPHTHALMOLOGY
Payer: MEDICARE

## 2020-08-11 VITALS
TEMPERATURE: 96.3 F | HEART RATE: 86 BPM | BODY MASS INDEX: 39.08 KG/M2 | SYSTOLIC BLOOD PRESSURE: 140 MMHG | RESPIRATION RATE: 20 BRPM | HEIGHT: 67 IN | OXYGEN SATURATION: 98 % | DIASTOLIC BLOOD PRESSURE: 72 MMHG | WEIGHT: 249 LBS

## 2020-08-11 VITALS — OXYGEN SATURATION: 100 % | DIASTOLIC BLOOD PRESSURE: 70 MMHG | SYSTOLIC BLOOD PRESSURE: 129 MMHG

## 2020-08-11 LAB
GLUCOSE BLD-MCNC: 103 MG/DL (ref 70–99)
GLUCOSE BLD-MCNC: 87 MG/DL (ref 70–99)
PERFORMED ON: ABNORMAL
PERFORMED ON: NORMAL

## 2020-08-11 PROCEDURE — 3600000004 HC SURGERY LEVEL 4 BASE: Performed by: OPHTHALMOLOGY

## 2020-08-11 PROCEDURE — 6370000000 HC RX 637 (ALT 250 FOR IP): Performed by: OPHTHALMOLOGY

## 2020-08-11 PROCEDURE — 6360000002 HC RX W HCPCS: Performed by: OPHTHALMOLOGY

## 2020-08-11 PROCEDURE — 6360000002 HC RX W HCPCS: Performed by: NURSE ANESTHETIST, CERTIFIED REGISTERED

## 2020-08-11 PROCEDURE — 7100000010 HC PHASE II RECOVERY - FIRST 15 MIN: Performed by: OPHTHALMOLOGY

## 2020-08-11 PROCEDURE — 3700000001 HC ADD 15 MINUTES (ANESTHESIA): Performed by: OPHTHALMOLOGY

## 2020-08-11 PROCEDURE — 2500000003 HC RX 250 WO HCPCS: Performed by: OPHTHALMOLOGY

## 2020-08-11 PROCEDURE — 3700000000 HC ANESTHESIA ATTENDED CARE: Performed by: OPHTHALMOLOGY

## 2020-08-11 PROCEDURE — V2632 POST CHMBR INTRAOCULAR LENS: HCPCS | Performed by: OPHTHALMOLOGY

## 2020-08-11 PROCEDURE — 7100000011 HC PHASE II RECOVERY - ADDTL 15 MIN: Performed by: OPHTHALMOLOGY

## 2020-08-11 PROCEDURE — 2580000003 HC RX 258: Performed by: ANESTHESIOLOGY

## 2020-08-11 PROCEDURE — 2709999900 HC NON-CHARGEABLE SUPPLY: Performed by: OPHTHALMOLOGY

## 2020-08-11 PROCEDURE — 3600000014 HC SURGERY LEVEL 4 ADDTL 15MIN: Performed by: OPHTHALMOLOGY

## 2020-08-11 PROCEDURE — 2720000010 HC SURG SUPPLY STERILE: Performed by: OPHTHALMOLOGY

## 2020-08-11 DEVICE — IMPLANTABLE DEVICE: Type: IMPLANTABLE DEVICE | Site: EYE | Status: FUNCTIONAL

## 2020-08-11 RX ORDER — SODIUM CHLORIDE 0.9 % (FLUSH) 0.9 %
10 SYRINGE (ML) INJECTION PRN
Status: DISCONTINUED | OUTPATIENT
Start: 2020-08-11 | End: 2020-08-11 | Stop reason: HOSPADM

## 2020-08-11 RX ORDER — FENTANYL CITRATE 50 UG/ML
INJECTION, SOLUTION INTRAMUSCULAR; INTRAVENOUS PRN
Status: DISCONTINUED | OUTPATIENT
Start: 2020-08-11 | End: 2020-08-11 | Stop reason: SDUPTHER

## 2020-08-11 RX ORDER — SODIUM CHLORIDE 0.9 % (FLUSH) 0.9 %
10 SYRINGE (ML) INJECTION EVERY 12 HOURS SCHEDULED
Status: DISCONTINUED | OUTPATIENT
Start: 2020-08-11 | End: 2020-08-11 | Stop reason: HOSPADM

## 2020-08-11 RX ORDER — CYCLOPENTOLATE HYDROCHLORIDE 10 MG/ML
1 SOLUTION/ DROPS OPHTHALMIC SEE ADMIN INSTRUCTIONS
Status: COMPLETED | OUTPATIENT
Start: 2020-08-11 | End: 2020-08-11

## 2020-08-11 RX ORDER — SODIUM CHLORIDE 9 MG/ML
INJECTION, SOLUTION INTRAVENOUS CONTINUOUS
Status: DISCONTINUED | OUTPATIENT
Start: 2020-08-11 | End: 2020-08-11 | Stop reason: HOSPADM

## 2020-08-11 RX ORDER — SODIUM CHLORIDE 0.9 % (FLUSH) 0.9 %
10 SYRINGE (ML) INJECTION PRN
Status: DISCONTINUED | OUTPATIENT
Start: 2020-08-11 | End: 2020-08-11 | Stop reason: SDUPTHER

## 2020-08-11 RX ORDER — PHENYLEPHRINE HCL 2.5 %
1 DROPS OPHTHALMIC (EYE) SEE ADMIN INSTRUCTIONS
Status: COMPLETED | OUTPATIENT
Start: 2020-08-11 | End: 2020-08-11

## 2020-08-11 RX ORDER — BRIMONIDINE TARTRATE 2 MG/ML
SOLUTION/ DROPS OPHTHALMIC
Status: COMPLETED | OUTPATIENT
Start: 2020-08-11 | End: 2020-08-11

## 2020-08-11 RX ORDER — SODIUM CHLORIDE 0.9 % (FLUSH) 0.9 %
10 SYRINGE (ML) INJECTION EVERY 12 HOURS SCHEDULED
Status: DISCONTINUED | OUTPATIENT
Start: 2020-08-11 | End: 2020-08-11 | Stop reason: SDUPTHER

## 2020-08-11 RX ORDER — CIPROFLOXACIN HYDROCHLORIDE 3.5 MG/ML
SOLUTION/ DROPS TOPICAL
Status: COMPLETED | OUTPATIENT
Start: 2020-08-11 | End: 2020-08-11

## 2020-08-11 RX ORDER — TETRACAINE HYDROCHLORIDE 5 MG/ML
1 SOLUTION OPHTHALMIC SEE ADMIN INSTRUCTIONS
Status: DISCONTINUED | OUTPATIENT
Start: 2020-08-11 | End: 2020-08-11 | Stop reason: HOSPADM

## 2020-08-11 RX ORDER — TETRACAINE HYDROCHLORIDE 5 MG/ML
SOLUTION OPHTHALMIC
Status: COMPLETED | OUTPATIENT
Start: 2020-08-11 | End: 2020-08-11

## 2020-08-11 RX ORDER — MIDAZOLAM HYDROCHLORIDE 1 MG/ML
INJECTION INTRAMUSCULAR; INTRAVENOUS PRN
Status: DISCONTINUED | OUTPATIENT
Start: 2020-08-11 | End: 2020-08-11 | Stop reason: SDUPTHER

## 2020-08-11 RX ORDER — CIPROFLOXACIN HYDROCHLORIDE 3.5 MG/ML
1 SOLUTION/ DROPS TOPICAL
Status: COMPLETED | OUTPATIENT
Start: 2020-08-11 | End: 2020-08-11

## 2020-08-11 RX ADMIN — PHENYLEPHRINE HYDROCHLORIDE 1 DROP: 25 SOLUTION/ DROPS OPHTHALMIC at 10:00

## 2020-08-11 RX ADMIN — MIDAZOLAM 1 MG: 1 INJECTION INTRAMUSCULAR; INTRAVENOUS at 11:03

## 2020-08-11 RX ADMIN — CYCLOPENTOLATE HYDROCHLORIDE 1 DROP: 10 SOLUTION/ DROPS OPHTHALMIC at 10:00

## 2020-08-11 RX ADMIN — CIPROFLOXACIN HYDROCHLORIDE 1 DROP: 3 SOLUTION/ DROPS OPHTHALMIC at 09:45

## 2020-08-11 RX ADMIN — MIDAZOLAM 1 MG: 1 INJECTION INTRAMUSCULAR; INTRAVENOUS at 11:01

## 2020-08-11 RX ADMIN — FENTANYL CITRATE 50 MCG: 50 INJECTION INTRAMUSCULAR; INTRAVENOUS at 11:14

## 2020-08-11 RX ADMIN — CIPROFLOXACIN HYDROCHLORIDE 1 DROP: 3 SOLUTION/ DROPS OPHTHALMIC at 09:50

## 2020-08-11 RX ADMIN — SODIUM CHLORIDE: 9 INJECTION, SOLUTION INTRAVENOUS at 11:01

## 2020-08-11 RX ADMIN — PHENYLEPHRINE HYDROCHLORIDE 1 DROP: 25 SOLUTION/ DROPS OPHTHALMIC at 09:50

## 2020-08-11 RX ADMIN — PHENYLEPHRINE HYDROCHLORIDE 1 DROP: 25 SOLUTION/ DROPS OPHTHALMIC at 09:45

## 2020-08-11 RX ADMIN — TETRACAINE HYDROCHLORIDE 1 DROP: 5 SOLUTION OPHTHALMIC at 09:57

## 2020-08-11 RX ADMIN — FENTANYL CITRATE 50 MCG: 50 INJECTION INTRAMUSCULAR; INTRAVENOUS at 11:01

## 2020-08-11 RX ADMIN — CYCLOPENTOLATE HYDROCHLORIDE 1 DROP: 10 SOLUTION/ DROPS OPHTHALMIC at 09:45

## 2020-08-11 RX ADMIN — SODIUM CHLORIDE: 9 INJECTION, SOLUTION INTRAVENOUS at 09:57

## 2020-08-11 RX ADMIN — CYCLOPENTOLATE HYDROCHLORIDE 1 DROP: 10 SOLUTION/ DROPS OPHTHALMIC at 09:50

## 2020-08-11 ASSESSMENT — PULMONARY FUNCTION TESTS
PIF_VALUE: 0

## 2020-08-11 ASSESSMENT — LIFESTYLE VARIABLES: SMOKING_STATUS: 0

## 2020-08-11 ASSESSMENT — ENCOUNTER SYMPTOMS: SHORTNESS OF BREATH: 0

## 2020-08-11 ASSESSMENT — PAIN SCALES - GENERAL
PAINLEVEL_OUTOF10: 0
PAINLEVEL_OUTOF10: 0

## 2020-08-11 ASSESSMENT — PAIN - FUNCTIONAL ASSESSMENT: PAIN_FUNCTIONAL_ASSESSMENT: 0-10

## 2020-08-11 NOTE — OP NOTE
[facility]  [facility]  [address], [address]  [phone]      8/11/2020    Patient name: Dany Handley  YOB: 1945  MRN: [id number]         OPERATIVE REPORT      DATE OF OPERATION: 8/11/2020     SURGEON: Pierce Calero  ASSISTANT(S): None            PREOPERATIVE DIAGNOSIS:  Age-related Nuclear Sclerotic Cataract -right eye  POSTOPERATIVE DIAGNOSIS:  same    OPERATION PERFORMED: Procedure(s):  Phacoemulsification with intraocular lens implant -right eye  ANESTHESIA:   Monitor Anesthesia Care  ESTIMATED BLOOD LOSS:  None  COMPLICATIONS:  None  IOL USED:  Bausch and Lomb MX60E +15.0    INDICATIONS FOR PROCEDURE:    Best corrected visual acuity of slit lamp confirmation cataract:  20/50 glare  Patient's evaluation of visual status of operative eye:  Decreased vision with reading and night vision times months. DESCRIPTION OF PROCEDURE: Dany Handley, is a 70 y.o. female. After topical  anesthesia and pupillary dilation were obtained, the patient was prepped and draped in the usual sterile fashion for cataract implant surgery. A wire lid speculum was placed and the operating microscope was moved into position over the eye. A paracentesis clear corneal incision was made with a super blade. Approximately 0.5 ml Epi-Shugarcaine was injected into the anterior chamber. This was followed by Viscoat to fill the anterior chamber. A temporal clear corneal incision was made with a 2.75 mm keratome and a bent needle and Utrata forceps were used to perform an anterior capsulorhexis. Hydrodissection was performed on the cataract. Phacoemulsification of the nucleus was performed. Cortex was removed using an automated irrigation/aspiration hand piece and vacuuming of the posterior capsule was also carried out with the same hand piece on minimal settings. Provisc was used to re-inflate the capsular bag and a foldable intraocular lens was placed into the capsular bag.  Provisc was removed from posterior to the implant and anterior to the implant by using a Sachin irrigation/aspiration hand piece. 0.2 ml Cefuroxime was placed in the anterior chamber. The corneal incision was checked and the incision was watertight without suture. The wire lid speculum was removed and the patient received topical Ciprofloxacin, and Alphagan P drops. At the conclusion of the procedure, the cornea was clear and the anterior chamber was deep, the pupil was round, and the implant was in good position. The patient tolerated the procedure well and was returned to the recovery room in good condition to be seen for follow-up the next day. ATTENDING ATTESTATION: I was present and scrubbed for the entire procedure.       ELECTRONICALLY SIGNED BY: Rosa Vargas, 8/11/2020 11:27 AM

## 2020-08-11 NOTE — ANESTHESIA PRE PROCEDURE
Department of Anesthesiology  Preprocedure Note       Name:  Sandra Phillips   Age:  70 y.o.  :  1945                                          MRN:  [id number]         Date:  2020      Surgeon: Genaro Astorga):  Shar Liu MD    Procedure: Procedure(s):  Phacoemulsification with intraocular lens implant    Medications prior to admission:   Prior to Admission medications    Medication Sig Start Date End Date Taking? Authorizing Provider   fenofibrate (TRIGLIDE) 160 MG tablet Take 160 mg by mouth daily    Historical Provider, MD   ferrous sulfate (IRON 325) 325 (65 Fe) MG tablet Take 325 mg by mouth daily (with breakfast)    Historical Provider, MD   atorvastatin (LIPITOR) 40 MG tablet Take 40 mg by mouth daily    Historical Provider, MD   diphenhydrAMINE HCl, Sleep, (SLEEP AID) 25 MG CAPS Take by mouth    Historical Provider, MD   topiramate (TOPAMAX) 100 MG tablet Take 100 mg by mouth 2 times daily    Historical Provider, MD   aspirin 81 MG EC tablet Take 81 mg by mouth daily    Historical Provider, MD   patiromer sorbitex calcium (VELTASSA) 8.4 g PACK packet Take 8.4 g by mouth daily    Historical Provider, MD   vitamin D (ERGOCALCIFEROL) 1.25 MG (67231 UT) CAPS capsule Take 50,000 Units by mouth once a week    Historical Provider, MD   NOVOLOG FLEXPEN 100 UNIT/ML injection pen Inject 12 Units into the skin 3 times daily (with meals)  19   Historical Provider, MD   BD PEN NEEDLE FANNIE U/F 32G X 4 MM MISC  3/4/15   Historical Provider, MD   LANTUS SOLOSTAR 100 UNIT/ML injection pen Inject 76 Units into the skin 2 times daily  14   Historical Provider, MD   gabapentin (NEURONTIN) 300 MG capsule Take 600 mg by mouth 3 times daily. 3/4/15   Historical Provider, MD       Current medications:    No current facility-administered medications for this visit. No current outpatient medications on file.      Facility-Administered Medications Ordered in Other Visits   Medication Dose Route Frequency Provider Last Rate Last Dose    0.9 % sodium chloride infusion   Intravenous Continuous Be Naranjo MD        sodium chloride flush 0.9 % injection 10 mL  10 mL Intravenous 2 times per day Be Naranjo MD        sodium chloride flush 0.9 % injection 10 mL  10 mL Intravenous PRN Be Naranjo MD        ciprofloxacin (CILOXAN) 0.3 % ophthalmic solution 1 drop  1 drop Right Eye See Admin Instructions Delia Hannah MD        cyclopentolate (CYCLOGYL) 1 % ophthalmic solution 1 drop  1 drop Right Eye See Admin Instructions Delia Hannah MD        phenylephrine (MYDFRIN) 2.5 % ophthalmic solution 1 drop  1 drop Right Eye See Admin Instructions Delia Hannah MD        sodium chloride flush 0.9 % injection 10 mL  10 mL Intravenous 2 times per day Delia Hannah MD        sodium chloride flush 0.9 % injection 10 mL  10 mL Intravenous PRN Delia Hannah MD        tetracaine (TETRAVISC) 0.5 % ophthalmic solution 1 drop  1 drop Right Eye See Admin Instructions Delia Hannah MD           Allergies:     Allergies   Allergen Reactions    Ace Inhibitors      Other reaction(s): Kidney Problem   Ok to take small dose of lisinopril    Duloxetine Hcl Diarrhea    Oxycodone Itching       Problem List:    Patient Active Problem List   Diagnosis Code    Pain in joint, lower leg M25.569    Pain in joint, ankle and foot M25.579    Flat foot M21.40    Peroneal tendinitis of left lower extremity M76.72    Neuropathy, diabetic (HCC) E11.40    Type II or unspecified type diabetes mellitus without mention of complication, not stated as uncontrolled E11.9    Primary osteoarthritis of right knee M17.11    Right knee pain M25.561    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    Chronic bilateral low back pain without sciatica M54.5, G89.29    Diabetic polyneuropathy associated with type 2 diabetes mellitus ([facility]) E11.42    Sepsis ([facility]) A41.9    Shock ([facility]) R57.9    Acute cystitis without hematuria N30.00    MICHELLE (acute kidney injury) ([facility] N17.9    Acute metabolic encephalopathy U81.88    Tachycardia R00.0    Tachypnea L26.83    Neutrophilic leukocytosis L48.6    Elevated lactic acid level R79.89    DMII (diabetes mellitus, type 2) ([facility]) E11.9    Hyperlipidemia E78.5    Essential hypertension I10    Morbid obesity due to excess calories ([facility]) E66.01    Stage 3 chronic kidney disease ([facility]) N18.3    Anemia in stage 4 chronic kidney disease ([facility]) N18.4, D63.1    Chronic kidney disease, stage IV (severe) ([facility]) N18.4       Past Medical History:        Diagnosis Date    Anemia in stage 4 chronic kidney disease ([address] 7/24/2020    Arthritis     back, knees      Back pain     Chronic kidney disease, stage IV (severe) ([address] 7/24/2020    Diabetes ([facility]     Hyperlipidemia     Hypertension     MDRO (multiple drug resistant organisms) resistance 05/20/2020    urine    Neuropathy     S/P epidural steroid injection     Sepsis ([address]     Sleep apnea     wears a CPAP        Past Surgical History:        Procedure Laterality Date    ABDOMEN SURGERY      BACK SURGERY      neck surgery too    CHOLECYSTECTOMY  01/01/2017    HYSTERECTOMY      INTRACAPSULAR CATARACT EXTRACTION Left 7/28/2020    Phacoemulsification with intraocular lens implant performed by Darshana Romeo MD at [address] [place] [address] [place]       Social History:    Social History     Tobacco Use    Smoking status: Former Smoker    Smokeless tobacco: Never Used    Tobacco comment: quit 22 yrs ago   Substance Use Topics    Alcohol use: No     Alcohol/week: 0.0 standard drinks                                Counseling given: Not Answered  Comment: quit 22 yrs ago      Vital Signs (Current): There were no vitals filed for this visit. BP Readings from Last 3 Encounters:   08/06/20 108/62   07/28/20 126/67   07/28/20 106/81       NPO Status:                            >8hrs                                                       BMI:   Wt Readings from Last 3 Encounters:   08/04/20 249 lb (112.9 kg)   08/06/20 247 lb 5.7 oz (112.2 kg)   07/21/20 249 lb (112.9 kg)     There is no height or weight on file to calculate BMI.    CBC:   Lab Results   Component Value Date    WBC 10.8 05/25/2020    RBC 3.39 05/25/2020    HGB 10.2 05/25/2020    HCT 30.8 05/25/2020    MCV 90.8 05/25/2020    RDW 16.2 05/25/2020     05/25/2020       CMP:   Lab Results   Component Value Date     05/25/2020    K 3.6 05/25/2020     05/25/2020    CO2 20 05/25/2020    BUN 25 05/25/2020    CREATININE 1.5 05/25/2020    GFRAA 41 05/25/2020    GFRAA 49 12/20/2011    AGRATIO 0.9 05/20/2020    LABGLOM 34 05/25/2020    GLUCOSE 93 05/25/2020    PROT 8.1 05/20/2020    PROT 7.7 10/21/2010    CALCIUM 9.7 05/25/2020    BILITOT 0.5 05/20/2020    ALKPHOS 83 05/20/2020    AST 10 05/20/2020    ALT 9 05/20/2020       POC Tests: No results for input(s): POCGLU, POCNA, POCK, POCCL, POCBUN, POCHEMO, POCHCT in the last 72 hours.     Coags: No results found for: PROTIME, INR, APTT    HCG (If Applicable): No results found for: PREGTESTUR, PREGSERUM, HCG, HCGQUANT     ABGs:   Lab Results   Component Value Date    PHART 7.336 08/03/2019    PO2ART 106.0 08/03/2019    EWZ3TFO 29.9 08/03/2019    PYI7SNK 15.6 08/03/2019    BEART -8.8 08/03/2019    B6HISAMK 98.2 08/03/2019        Type & Screen (If Applicable):  No results found for: LABABO, LABRH    Drug/Infectious Status (If Applicable):  No results found for: HIV, HEPCAB    COVID-19 Screening (If Applicable): No results found for: COVID19      Anesthesia Evaluation  Patient summary reviewed no history of anesthetic complications:   Airway: Mallampati: II  TM distance: >3 FB   Neck ROM: full  Mouth opening: > = 3 FB Dental:    (+) partials      Pulmonary: breath sounds clear to auscultation  (+) sleep apnea: on CPAP,      (-) COPD, asthma, shortness of breath, recent URI and not a current smoker                           Cardiovascular:    (+) hypertension:, hyperlipidemia    (-) valvular problems/murmurs, past MI, CAD, CABG/stent, dysrhythmias,  angina,  CHF and orthopnea      Rhythm: regular  Rate: normal                    Neuro/Psych:   (+) neuromuscular disease:,    (-) seizures, TIA, CVA and headaches           GI/Hepatic/Renal:   (+) renal disease: CRI, morbid obesity     (-) GERD, PUD, hepatitis and liver disease       Endo/Other:    (+) DiabetesType II DM, , : arthritis:., .    (-) hypothyroidism, hyperthyroidism, blood dyscrasia               Abdominal:   (+) obese,         Vascular:                                          Anesthesia Plan      MAC     ASA 3       Induction: intravenous. Anesthetic plan and risks discussed with patient. Plan discussed with CRNA. This pre-anesthesia assessment may be used as a history and physical.    DOS STAFF ADDENDUM:    Pt seen and examined, chart reviewed (including anesthesia, drug and allergy history). No interval changes to history and physical examination. Anesthetic plan, risks, benefits, alternatives, and personnel involved discussed with patient. Patient verbalized an understanding and agrees to proceed.       Shawn Castaneda MD  August 11, 2020  9:34 AM      Shawn Castaneda MD   8/11/2020

## 2020-08-11 NOTE — PROGRESS NOTES
Discharge instructions reviewed with son over the phone. Dr. Saul Fagan spoke with patient at the bedside and reviewed discharge instructions with pt.

## 2020-08-11 NOTE — ANESTHESIA POSTPROCEDURE EVALUATION
Department of Anesthesiology  Postprocedure Note    Patient: Manjula Chavarria  MRN: [id number]  YOB: 1945  Date of evaluation: 8/11/2020  Time:  11:45 AM     Procedure Summary     Date:  08/11/20 Room / Location:  [facility]    Anesthesia Start:  1100 Anesthesia Stop:  1127    Procedure:  Phacoemulsification with intraocular lens implant (Right Eye) Diagnosis:       Nuclear sclerosis, right      (Nuclear sclerosis, right)    Surgeon:  Pieter Grigsby MD Responsible Provider:  Norma Lemus MD    Anesthesia Type:  MAC ASA Status:  3          Anesthesia Type: MAC    Angélica Phase I: Angélica Score: 10    Angélica Phase II: Angélica Score: 10    Last vitals: Reviewed and per EMR flowsheets. Anesthesia Post Evaluation    Patient location during evaluation: PACU  Patient participation: complete - patient participated  Level of consciousness: awake and alert  Pain score: 0  Airway patency: patent  Nausea & Vomiting: no nausea and no vomiting  Complications: no  Cardiovascular status: blood pressure returned to baseline  Respiratory status: acceptable  Hydration status: euvolemic  Comments: Pt very agitated in PACU.  but thought to be because of patients mental state.   Will d/c home without medication per patient request.

## 2020-08-11 NOTE — H&P
Date of Surgery Update:  Karma Collins was seen, history and physical examination reviewed, and patient examined by me today. There have been no significant clinical changes since the completion of the previous history and physical.    The risk, benefits, and alternatives of the proposed procedure have been explained to the patient (or appropriate guardian) and understanding verbalized. All questions answered. Patient wishes to proceed.     Electronically signed by: Carol Cross MD,8/11/2020,10:17 AM

## 2020-08-13 ENCOUNTER — OFFICE VISIT (OUTPATIENT)
Dept: ORTHOPEDIC SURGERY | Age: 75
End: 2020-08-13
Payer: MEDICARE

## 2020-08-13 VITALS — HEIGHT: 67 IN | BODY MASS INDEX: 38.77 KG/M2 | WEIGHT: 247 LBS | TEMPERATURE: 97 F

## 2020-08-13 PROCEDURE — G8400 PT W/DXA NO RESULTS DOC: HCPCS | Performed by: FAMILY MEDICINE

## 2020-08-13 PROCEDURE — 3017F COLORECTAL CA SCREEN DOC REV: CPT | Performed by: FAMILY MEDICINE

## 2020-08-13 PROCEDURE — 1036F TOBACCO NON-USER: CPT | Performed by: FAMILY MEDICINE

## 2020-08-13 PROCEDURE — 20610 DRAIN/INJ JOINT/BURSA W/O US: CPT | Performed by: FAMILY MEDICINE

## 2020-08-13 PROCEDURE — 4040F PNEUMOC VAC/ADMIN/RCVD: CPT | Performed by: FAMILY MEDICINE

## 2020-08-13 PROCEDURE — 1123F ACP DISCUSS/DSCN MKR DOCD: CPT | Performed by: FAMILY MEDICINE

## 2020-08-13 PROCEDURE — G8417 CALC BMI ABV UP PARAM F/U: HCPCS | Performed by: FAMILY MEDICINE

## 2020-08-13 PROCEDURE — G8427 DOCREV CUR MEDS BY ELIG CLIN: HCPCS | Performed by: FAMILY MEDICINE

## 2020-08-13 PROCEDURE — 99214 OFFICE O/P EST MOD 30 MIN: CPT | Performed by: FAMILY MEDICINE

## 2020-08-13 PROCEDURE — 1090F PRES/ABSN URINE INCON ASSESS: CPT | Performed by: FAMILY MEDICINE

## 2020-08-13 NOTE — PROGRESS NOTES
Chief Complaint  Knee Pain ([address] RIGHT KNEE)      Ruthie Ya is a 70 y.o. female is a very pleasant white female very nice patient of  Dr. Ann Humphrey who is a reasonably well-controlled diabetic with her last hemoglobin A1c at 6.1 who has a history of renal disease and neuropathy as well who is being seen today in kind consultation from Dr. Ann Humphrey for evaluation of a new orthopedic condition to her left knee.  We had seen her last in the office on 12/11/2020 will be completed Orthovisc to her right knee which is clinically doing well. History of Present Illness for Follow Up Patient:      Agatha Krishnamurthy is being seen in follow up today for chronic left knee pain. The patient has completed Visco supplementation with Orthovisc to her right knee on 2/11/2020. This did help her substantially. She has noticed increasing soreness of the leg but there is no interim history of injury or no activity. She has had difficulty last several months that she was hospitalized for 5 days what sounds like urosepsis and was treated for multiple weeks with antibiotics and got fairly weak. She did have to have posthospitalization rehab and has been working on her exercise program but this is caused achiness in her knee. Positional changes distance walking and going up and down stairs can produce pain in the range of 4-5 out of 10 but thankfully she is not having a great deal of rest pain. Activities relieving current symptoms include rest and using topical compound cream. Treatment to date includes: rest, ice, NSAID- topical cream, physical therapy, home exercises, knee bracing, and cortisone injections. She has not had visco-supplementation since 2/11/2020 to her left knee but is interested in once again pursuing this as she did get a good response to this in the past.  Has been working with the physical therapist at [facility].   Has been very compliant with her home-based exercises and has been using her cream more consistently. .            Attest: I have reviewed and attest the documentation of the HPI documented by my . I will make any changes if necessary. Enc Date: 8/13/2020  Time: 2:02 PM  Provider: Yoselin Kingsley MD        Social History     Tobacco Use    Smoking status: Former Smoker    Smokeless tobacco: Never Used    Tobacco comment: quit 22 yrs ago   Substance Use Topics    Alcohol use: No     Alcohol/week: 0.0 standard drinks    Drug use: No        Review of Systems  Pertinent items are noted in HPI  Review of systems reviewed from Patient History Form dated on 1/21/2020 and available in the patient's chart under the Media tab. Vital Signs     Temp 97 °F (36.1 °C) (Temporal)   Ht 5' 7\" (1.702 m)   Wt 247 lb (112 kg)   BMI 38.69 kg/m²       General Exam:   Constitutional: Patient is adequately groomed with no evidence of malnutrition  DTRs: Deep tendon reflexes are intact  Mental Status: The patient is oriented to time, place and person. The patient's mood and affect are appropriate. Lymphatic: The lymphatic examination bilaterally reveals all areas to be without enlargement or induration. Vascular: Examination reveals no swelling or calf tenderness. Peripheral pulses are palpable and 2+. Neurological: The patient has good coordination. There is no weakness or sensory deficit.       Left knee Examination  Inspection:  There is no high-grade deformity although she does have some patellofemoral crepitation With a trace to 1+ knee effusion.     Palpation:  She does have some mild focal tenderness over the medial and lateral patellofemoral facet.  She does have a mildly positive grind test and only mild pain over the lateral and medial joint line and to lesser degree IT band.  She does have held pain with medial Maria Luisa's testing.     Rang of Motion:  She is stiff the terminal 5-10° of extension with tightness to her hamstrings.  Flexion to 110.     Strength:  4+ out of 5 with knee flexion extension.     Special Tests: Recurrent mild positive grind testing reproducing majority of her pain.  Less prominent pain with palpation of the medial joint line and seems to have less pain with medial Maria Luisa's.  Questionable click.  Mild pain with lateral Maria Luisa's. . No instability.  Screening hip testing is benign.     Skin: There are no rashes, ulcerations or lesions. Distal vascular exam appears to be intact.  She does have decrease intact sensation to the dorsums of her feet secondary to her neuropathy without ulceration.     Gait: Fluid gait.  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     Reflex 1+ knees and ankles.     Additional Comments:      Additional Examinations:  Contralateral Exam: Examination of the right knee reveals intact skin.  There is no focal tenderness.  The patient demonstrates full painless range of motion with regards to flexion and extension.  Strength is 5/5 thorough out all planes.  Ligamentous stability is grossly intact.  She does have patellofemoral crepitation at best only a trace knee joint effusion on the right at this point. Quiana Barbara grind testing is not overwhelmingly positive and there is no substantial joint line tenderness.  Reasonable strength with tightness to her hamstrings.     Examination of the bilateral hip reveals intact skin.  The patient demonstrates full painless range of motion with regards to flexion, abduction, internal and external rotation.  There is not tenderness about the greater trochanter. Charlcie [place] is a negative straight leg raise against resistance.  Strength is 5/5 thorough out all planes.     Right Lower Extremity: Examination of the right lower extremity does not show any tenderness, deformity or injury.  Range of motion is unremarkable. Charlcie [place] is no gross instability.  There are no rashes, ulcerations or lesions.  Strength and tone are normal.  Left Lower Extremity: Examination of the left lower extremity does not show any tenderness, deformity or injury.  Range of motion is unremarkable. Bethena Lighter is no gross instability.  There are no rashes, ulcerations or lesions.  Strength and tone are normal.        Diagnostic Test Findings:  Left knee AP and PA weightbearing sunrise and lateral films were once again reviewed from 12/17/2019 and does show mild to moderate medial compartment narrowing with patellofemoral arthropathy and spurring          Assessment & Plan:    Encounter Diagnoses   Name Primary?  Primary osteoarthritis of right knee Yes    Chondromalacia of right patella     Chronic pain of right knee        Orders Placed This Encounter   Procedures    IN ARTHROCENTESIS ASPIR&/INJ MAJOR JT/BURSA W/O US         Treatment Plan:  Treatment options were discussed with Kapil Carver. Clinically at this time she  become a little bit more sore which a lot of this may very well be attributable to having medical issues with urosepsis and being very fatigued over the last several months and not as active. After discussion of pros and cons of Visco supplementation, she did receive her first injection of Orthovisc to her knees bilaterally. This was performed using the standard prefilled 2 cc syringe. She was strongly encouraged to continue with her exercise program on a daily basis and may utilize her Voltaren gel. Icing and activity modification was discussed and she will be seen back over the next 2 weeks to continue with her Visco supplementation series. She will contact us in the interim with questions or concerns. This dictation was performed with a verbal recognition program (DRAGON) and it was checked for errors. It is possible that there are still dictated errors within this office note. If so, please bring any errors to my attention for an addendum. All efforts were made to ensure that this office note is accurate.

## 2020-08-14 ENCOUNTER — HOSPITAL ENCOUNTER (OUTPATIENT)
Dept: INFUSION THERAPY | Age: 75
Setting detail: INFUSION SERIES
Discharge: HOME | End: 2020-08-14
Payer: MEDICARE

## 2020-08-14 VITALS
TEMPERATURE: 98.8 F | RESPIRATION RATE: 17 BRPM | DIASTOLIC BLOOD PRESSURE: 59 MMHG | SYSTOLIC BLOOD PRESSURE: 121 MMHG | HEART RATE: 72 BPM | OXYGEN SATURATION: 97 %

## 2020-08-14 DIAGNOSIS — N18.4 ANEMIA IN STAGE 4 CHRONIC KIDNEY DISEASE (HCC): Primary | ICD-10-CM

## 2020-08-14 DIAGNOSIS — D63.1 ANEMIA IN STAGE 4 CHRONIC KIDNEY DISEASE (HCC): Primary | ICD-10-CM

## 2020-08-14 DIAGNOSIS — N18.4 CHRONIC KIDNEY DISEASE, STAGE IV (SEVERE) (HCC): ICD-10-CM

## 2020-08-14 PROCEDURE — 6360000002 HC RX W HCPCS: Performed by: INTERNAL MEDICINE

## 2020-08-14 PROCEDURE — 2580000003 HC RX 258: Performed by: INTERNAL MEDICINE

## 2020-08-14 PROCEDURE — 96365 THER/PROPH/DIAG IV INF INIT: CPT

## 2020-08-14 RX ORDER — SODIUM CHLORIDE 9 MG/ML
INJECTION, SOLUTION INTRAVENOUS CONTINUOUS
Status: CANCELLED | OUTPATIENT
Start: 2020-08-20

## 2020-08-14 RX ORDER — DIPHENHYDRAMINE HYDROCHLORIDE 50 MG/ML
50 INJECTION INTRAMUSCULAR; INTRAVENOUS ONCE
Status: CANCELLED | OUTPATIENT
Start: 2020-08-20

## 2020-08-14 RX ORDER — EPINEPHRINE 1 MG/ML
0.3 INJECTION, SOLUTION, CONCENTRATE INTRAVENOUS PRN
Status: CANCELLED | OUTPATIENT
Start: 2020-08-20

## 2020-08-14 RX ORDER — METHYLPREDNISOLONE SODIUM SUCCINATE 125 MG/2ML
125 INJECTION, POWDER, LYOPHILIZED, FOR SOLUTION INTRAMUSCULAR; INTRAVENOUS ONCE
Status: CANCELLED | OUTPATIENT
Start: 2020-08-20

## 2020-08-14 RX ORDER — SODIUM CHLORIDE 0.9 % (FLUSH) 0.9 %
10 SYRINGE (ML) INJECTION PRN
Status: DISCONTINUED | OUTPATIENT
Start: 2020-08-14 | End: 2020-08-15 | Stop reason: HOSPADM

## 2020-08-14 RX ORDER — SODIUM CHLORIDE 0.9 % (FLUSH) 0.9 %
10 SYRINGE (ML) INJECTION PRN
Status: CANCELLED | OUTPATIENT
Start: 2020-08-20

## 2020-08-14 RX ADMIN — Medication 10 ML: at 09:42

## 2020-08-14 RX ADMIN — Medication 10 ML: at 10:37

## 2020-08-14 RX ADMIN — FERRIC CARBOXYMALTOSE INJECTION 750 MG: 50 INJECTION, SOLUTION INTRAVENOUS at 10:04

## 2020-08-14 ASSESSMENT — PAIN SCALES - GENERAL: PAINLEVEL_OUTOF10: 0

## 2020-08-14 NOTE — PROGRESS NOTES
[facility] VISIT    NAME:  Eduardo Hitchcock OF BIRTH:  1945  MEDICAL RECORD NUMBER:  [id number]  EPISODE DATE:  8/14/2020    Patient arrived to [facility]   [] per wheelchair   [x] ambulatory     Has the patient had a previous problem with Injectafer or Iron Infusions? No  Any current infection or illness? No   Patient on antibiotics? No   History of Hypertension? Yes     Is the patient experiencing any:  Fatigue:   []   None  [x]   Increase over baseline but not altering normal activities  []   Moderate of causing difficulty performing some activities  []   Severe or loss of ability to perform some activities  []   Bedridden or disabling     Dizziness or Lightheadedness:   [x]   None  []   No Interference  []   Interferes with functioning but not activities of daily living  []   Interferes with daily activies  []   Bedridden or disabling     Shortness of Breath:   []   None   [x]   Dyspneic on exertion   []   Dyspnea with normal activities  []   Dyspnea at rest    Edema: none      Tachycardia: No    Heart Palpitations: No      Chest Pain: No      BP (!) 121/59   Pulse 72   Temp 98.8 °F (37.1 °C) (Oral)   Resp 17   SpO2 97%   Patient Vitals for the past 2 hrs:   BP Temp Temp src Pulse Resp SpO2   08/14/20 1034 (!) 121/59 -- -- 72 17 --   08/14/20 0944 113/69 98.8 °F (37.1 °C) Oral 72 17 97 %       Current Lab Data:  Hemoglobin/Hematocrit:    Lab Results   Component Value Date    HGB 10.2 05/25/2020    HCT 30.8 05/25/2020     IRON:    Lab Results   Component Value Date    IRON 14 08/03/2019     Iron Saturation:    Lab Results   Component Value Date    LABIRON 6 08/03/2019     TIBC:    Lab Results   Component Value Date    TIBC 224 08/03/2019     FERRITIN:    Lab Results   Component Value Date    FERRITIN 519.4 08/03/2019       Dose Administered: 750 mg  Todays dose is number 2 out of2 ordered for this patient.     Response to treatment:  Well tolerated by patient.     Education:    Verbalized understanding and printed AVS given to patient         Electronically signed by Kavin Valdovinos RN on 8/14/2020 at 10:46 AM

## 2020-08-20 ENCOUNTER — OFFICE VISIT (OUTPATIENT)
Dept: ORTHOPEDIC SURGERY | Age: 75
End: 2020-08-20
Payer: MEDICARE

## 2020-08-20 VITALS — WEIGHT: 244 LBS | BODY MASS INDEX: 38.3 KG/M2 | TEMPERATURE: 97.3 F | HEIGHT: 67 IN

## 2020-08-20 PROCEDURE — 20610 DRAIN/INJ JOINT/BURSA W/O US: CPT | Performed by: FAMILY MEDICINE

## 2020-08-20 NOTE — PROGRESS NOTES
CC:  FU Knee Osteoarthritis with Viscosupplementation       HPI: Sarah Lofton is a very pleasant 79-year-old white female reasonably well-controlled diabetic with history of neuropathy who is being seen today to continue with her Orthovisc series for her right knee underlying osteoarthritis with chondromalacia. She has noticed a moderate to significant improvement overall of her knee pain symptoms. She did have to miss her appointment last week as she was at home sick with a stomach virus. She is here today for her second Orthovisc injection. She seems to be noticing less popping and grinding. She has not had locking or catching. She has done reasonably well with physical supplementation in the past.      PE: no substantial change in exam.    Assessment:  Knee Osteoarthritis with viscosupplementation      PROCEDURE NOTE:    PRE-PROCEDURE DIAGNOSIS: DJD knee    POST-PROCEDURE DIAGNOSIS: DJD knee    Injection #2 of Orthovisc . PROCEDURE:  With the patient's permission, her right knee was prepped in standard sterile fashion with Betadine and Alcohol and the prefilled 2cc injection of Orthovisc was injected intra-articularly into the right knee via a superolateral approach without difficulty. The patient tolerated this well without difficulty. A band-aid was applied. POST-PROCEDURE INSTRUCTIONS GIVEN TO PATIENT: The patient was advised to ice the knee for 15-20 minutes to relieve any injection site related pain. FOLLOW-UP: as directed. She will continue with her Voltaren gel as well as periodic bracing and physical therapy at [facility]. She'll be seen back next week to complete her Orthovisc series. She will contact us with questions or concerns in the interim.

## 2020-08-27 ENCOUNTER — OFFICE VISIT (OUTPATIENT)
Dept: ORTHOPEDIC SURGERY | Age: 75
End: 2020-08-27
Payer: MEDICARE

## 2020-08-27 VITALS — WEIGHT: 244 LBS | BODY MASS INDEX: 38.3 KG/M2 | HEIGHT: 67 IN

## 2020-08-27 PROCEDURE — 20610 DRAIN/INJ JOINT/BURSA W/O US: CPT | Performed by: FAMILY MEDICINE

## 2020-08-27 NOTE — PROGRESS NOTES
CC:  FU Knee Osteoarthritis with Viscosupplementation       HPI: Simone Crandall is a very pleasant 77-year-old white female reasonably well-controlled diabetic with history of neuropathy who is being seen today to continue with her Orthovisc series for her right knee underlying osteoarthritis with chondromalacia. She has noticed a significant improvement overall of her knee pain symptoms. She did have to miss her appointment last week as she was at home sick with a stomach virus. She is here today for her third Orthovisc injection. She seems to be noticing less popping and grinding. She has not had locking or catching. She has done reasonably well with Visco supplementation in the past.      PE: no substantial change in exam.    Assessment:  Knee Osteoarthritis with viscosupplementation      PROCEDURE NOTE:    PRE-PROCEDURE DIAGNOSIS: DJD knee    POST-PROCEDURE DIAGNOSIS: DJD knee    Injection #3 of Orthovisc . PROCEDURE:  With the patient's permission, her right knee was prepped in standard sterile fashion with Betadine and Alcohol and the prefilled 2cc injection of Orthovisc was injected intra-articularly into the right knee via a superolateral approach without difficulty. The patient tolerated this well without difficulty. A band-aid was applied. POST-PROCEDURE INSTRUCTIONS GIVEN TO PATIENT: The patient was advised to ice the knee for 15-20 minutes to relieve any injection site related pain. FOLLOW-UP: as directed. She will continue with her Voltaren gel as well as periodic bracing and physical therapy home exercise program.   She is aware that she can repeat Visco supplementation in 6 months and will contact us in the interim with questions or concerns.

## 2020-11-03 PROBLEM — E11.9 DMII (DIABETES MELLITUS, TYPE 2) (HCC): Status: RESOLVED | Noted: 2020-05-21 | Resolved: 2020-11-03

## 2021-01-28 DIAGNOSIS — M17.11 PRIMARY OSTEOARTHRITIS OF RIGHT KNEE: Primary | ICD-10-CM

## 2021-01-28 DIAGNOSIS — M25.561 CHRONIC PAIN OF RIGHT KNEE: ICD-10-CM

## 2021-01-28 DIAGNOSIS — M22.41 CHONDROMALACIA OF RIGHT PATELLA: ICD-10-CM

## 2021-01-28 DIAGNOSIS — G89.29 CHRONIC PAIN OF RIGHT KNEE: ICD-10-CM

## 2021-01-29 ENCOUNTER — TELEPHONE (OUTPATIENT)
Dept: ORTHOPEDIC SURGERY | Age: 76
End: 2021-01-29

## 2021-01-29 NOTE — TELEPHONE ENCOUNTER
01/29/2021  [address] [facility]  (SERIES OF 3) RIGHT KNEE. NO AUTHORIZATION REQUIRED. VALID & BILLABLE. CAN BUY& BILL. PER [facility] REF# [id number] & call ref [id number].    AP

## 2021-02-02 ENCOUNTER — TELEPHONE (OUTPATIENT)
Dept: ORTHOPEDIC SURGERY | Age: 76
End: 2021-02-02

## 2021-02-02 NOTE — TELEPHONE ENCOUNTER
Spoke to patient and let them know that [address] injections have been approved for their RIGHT knee. Scheduled patient for those injections.

## 2021-08-18 ENCOUNTER — TELEPHONE (OUTPATIENT)
Dept: INTERNAL MEDICINE CLINIC | Age: 76
End: 2021-08-18

## 2021-08-24 ENCOUNTER — TELEPHONE (OUTPATIENT)
Dept: ORTHOPEDIC SURGERY | Age: 76
End: 2021-08-24

## 2021-08-24 NOTE — TELEPHONE ENCOUNTER
Other Patient is calling requesting a call back. Patient is wanting to get a cortisone injection but had some questions and has been trying to reach the office since last thursday.   [phone]

## 2021-08-31 ENCOUNTER — TELEPHONE (OUTPATIENT)
Dept: ORTHOPEDIC SURGERY | Age: 76
End: 2021-08-31

## 2021-08-31 ENCOUNTER — OFFICE VISIT (OUTPATIENT)
Dept: ORTHOPEDIC SURGERY | Age: 76
End: 2021-08-31
Payer: MEDICARE

## 2021-08-31 VITALS — HEIGHT: 67 IN | WEIGHT: 244 LBS | BODY MASS INDEX: 38.3 KG/M2

## 2021-08-31 DIAGNOSIS — M17.11 PRIMARY OSTEOARTHRITIS OF RIGHT KNEE: Primary | ICD-10-CM

## 2021-08-31 DIAGNOSIS — M25.562 CHRONIC PAIN OF LEFT KNEE: ICD-10-CM

## 2021-08-31 DIAGNOSIS — G89.29 CHRONIC PAIN OF RIGHT KNEE: ICD-10-CM

## 2021-08-31 DIAGNOSIS — M25.561 CHRONIC PAIN OF RIGHT KNEE: ICD-10-CM

## 2021-08-31 DIAGNOSIS — M22.41 CHONDROMALACIA OF RIGHT PATELLA: ICD-10-CM

## 2021-08-31 DIAGNOSIS — G89.29 CHRONIC PAIN OF LEFT KNEE: ICD-10-CM

## 2021-08-31 PROCEDURE — 1036F TOBACCO NON-USER: CPT | Performed by: FAMILY MEDICINE

## 2021-08-31 PROCEDURE — 1090F PRES/ABSN URINE INCON ASSESS: CPT | Performed by: FAMILY MEDICINE

## 2021-08-31 PROCEDURE — G8417 CALC BMI ABV UP PARAM F/U: HCPCS | Performed by: FAMILY MEDICINE

## 2021-08-31 PROCEDURE — 4040F PNEUMOC VAC/ADMIN/RCVD: CPT | Performed by: FAMILY MEDICINE

## 2021-08-31 PROCEDURE — 99214 OFFICE O/P EST MOD 30 MIN: CPT | Performed by: FAMILY MEDICINE

## 2021-08-31 PROCEDURE — 3017F COLORECTAL CA SCREEN DOC REV: CPT | Performed by: FAMILY MEDICINE

## 2021-08-31 PROCEDURE — G8427 DOCREV CUR MEDS BY ELIG CLIN: HCPCS | Performed by: FAMILY MEDICINE

## 2021-08-31 PROCEDURE — G8400 PT W/DXA NO RESULTS DOC: HCPCS | Performed by: FAMILY MEDICINE

## 2021-08-31 PROCEDURE — 20610 DRAIN/INJ JOINT/BURSA W/O US: CPT | Performed by: FAMILY MEDICINE

## 2021-08-31 PROCEDURE — 1123F ACP DISCUSS/DSCN MKR DOCD: CPT | Performed by: FAMILY MEDICINE

## 2021-08-31 RX ORDER — BETAMETHASONE SODIUM PHOSPHATE AND BETAMETHASONE ACETATE 3; 3 MG/ML; MG/ML
24 INJECTION, SUSPENSION INTRA-ARTICULAR; INTRALESIONAL; INTRAMUSCULAR; SOFT TISSUE ONCE
Status: COMPLETED | OUTPATIENT
Start: 2021-08-31 | End: 2021-08-31

## 2021-08-31 RX ORDER — SODIUM BICARBONATE 650 MG/1
TABLET ORAL
COMMUNITY
Start: 2021-08-04

## 2021-08-31 RX ORDER — GLUCOSAM/CHON-MSM1/C/MANG/BOSW 500-416.6
TABLET ORAL
COMMUNITY
Start: 2021-08-24

## 2021-08-31 RX ORDER — BUPIVACAINE HYDROCHLORIDE 2.5 MG/ML
4 INJECTION, SOLUTION INFILTRATION; PERINEURAL ONCE
Status: COMPLETED | OUTPATIENT
Start: 2021-08-31 | End: 2021-08-31

## 2021-08-31 RX ORDER — AMLODIPINE BESYLATE 2.5 MG/1
TABLET ORAL
COMMUNITY
Start: 2021-08-10

## 2021-08-31 RX ORDER — CALCIUM CITRATE/VITAMIN D3 200MG-6.25
TABLET ORAL
COMMUNITY
Start: 2021-08-23

## 2021-08-31 RX ORDER — LIDOCAINE HYDROCHLORIDE 10 MG/ML
2 INJECTION, SOLUTION INFILTRATION; PERINEURAL ONCE
Status: COMPLETED | OUTPATIENT
Start: 2021-08-31 | End: 2021-08-31

## 2021-08-31 RX ORDER — ATORVASTATIN CALCIUM 40 MG/1
TABLET, FILM COATED ORAL
COMMUNITY
Start: 2021-08-29

## 2021-08-31 RX ADMIN — LIDOCAINE HYDROCHLORIDE 2 ML: 10 INJECTION, SOLUTION INFILTRATION; PERINEURAL at 11:23

## 2021-08-31 RX ADMIN — BETAMETHASONE SODIUM PHOSPHATE AND BETAMETHASONE ACETATE 24 MG: 3; 3 INJECTION, SUSPENSION INTRA-ARTICULAR; INTRALESIONAL; INTRAMUSCULAR; SOFT TISSUE at 11:20

## 2021-08-31 RX ADMIN — BUPIVACAINE HYDROCHLORIDE 10 MG: 2.5 INJECTION, SOLUTION INFILTRATION; PERINEURAL at 11:21

## 2021-08-31 NOTE — TELEPHONE ENCOUNTER
General Question     Subject: ?  Patient and /or Facility Request: PATIENT REQUEST THAT [facility] [address]  Contact Number: [phone]

## 2021-08-31 NOTE — TELEPHONE ENCOUNTER
CALLED PATIENT AND LEFT MESSAGE TO INFORM HER THAT EUFLEXXA INJECTIONS HAVE BEEN APPROVED. WANTED TO SEE IF SHE WAS READY TO GO AHEAD AND SCHEDULE THAT OR IF SHE NEEDED TO WAIT A BIT LONGER.  PATIENT CAN CALL BACK AT HER CONVENIENCE TO SCHEDULE

## 2021-08-31 NOTE — PROGRESS NOTES
Chief Complaint  Knee Pain (F/U RIGHT KNEE, LEFT KNEE ACHY)      Duane Bouchard is a 78 y.o. female is a very pleasant white female very nice patient of  Dr. Brigitte Schwartz who is a reasonably well-controlled diabetic with her last hemoglobin A1c at 6.1 who has a history of renal disease and neuropathy as well who is being seen today for follow-up on her bilateral knee osteoarthritis with patellofemoral arthropathy.  We had seen her last in the office on 8/27/2020 will be completed Orthovisc to her right knee. History of Present Illness for Follow Up Patient:      Ritesh Bowman is being seen in follow up today for chronic left knee pain. The patient has completed Visco supplementation with Orthovisc to her right knee on 8/27/2020. Ana Ivan This did help her substantially. She has noticed increasing soreness of the leg but there is no interim history of injury or no activity. She has had difficulty last 4-5 months with pain in her knee. There is no history of actual injury no activity prior to becoming symptomatic but she has able to get back in the [facility] to exercise. She continues to deny pain at rest but will have 5 out of 10 pain with distance walking which does choco relatively quickly after resting. No night pain. Positional changes distance walking and going up and down stairs can produce pain in the range of 4-5 out of 10 but thankfully she is not having a great deal of rest pain. Activities relieving current symptoms include rest and using topical compound cream. Treatment to date includes: rest, ice, NSAID- topical Kemi gel over-the-counter, physical therapy, home exercises, knee bracing, and cortisone injections. She has not had visco-supplementation since 8/27/2020 to her right knee but is interested in once again pursuing this as she did get a good response to this in the past.  Has been through therapy previously at [facility].   Has been very compliant with her home-based exercises and has been using her cream more consistently. . Denies locking catching or true instability symptoms. Pain Assessment  Location of Pain: Knee  Location Modifiers: Right, Left, Lateral  Severity of Pain: 0  Quality of Pain: Aching  Duration of Pain: Persistent  Frequency of Pain: Intermittent  Aggravating Factors: Other (Comment)  Limiting Behavior: Some  Relieving Factors: Rest  Result of Injury: No  Work-Related Injury: No  Are there other pain locations you wish to document?: No     Attest: I have reviewed and attest the documentation of the HPI documented by my . I will make any changes if necessary. Enc Date: 8/31/2021  Time: 11:08 AM  Provider: Kimmy Carlson MD        Social History     Tobacco Use    Smoking status: Former Smoker    Smokeless tobacco: Never Used    Tobacco comment: quit 22 yrs ago   Vaping Use    Vaping Use: Never used   Substance Use Topics    Alcohol use: No     Alcohol/week: 0.0 standard drinks    Drug use: No        Review of Systems  Pertinent items are noted in HPI  Review of systems reviewed from Patient History Form dated on 1/21/2020 and available in the patient's chart under the Media tab. Vital Signs     Ht 5' 7\" (1.702 m)   Wt 244 lb (110.7 kg)   BMI 38.22 kg/m²       General Exam:   Constitutional: Patient is adequately groomed with no evidence of malnutrition  DTRs: Deep tendon reflexes are intact  Mental Status: The patient is oriented to time, place and person. The patient's mood and affect are appropriate. Lymphatic: The lymphatic examination bilaterally reveals all areas to be without enlargement or induration. Vascular: Examination reveals no swelling or calf tenderness. Peripheral pulses are palpable and 2+. Neurological: The patient has good coordination. There is no weakness or sensory deficit.       Left knee Examination  Inspection:  There is no high-grade deformity although she does have some patellofemoral crepitation With a trace to 1+ knee effusion.     Palpation:  She does have some mild focal tenderness over the medial and lateral patellofemoral facet.  She does have a mildly positive grind test and only mild pain over the lateral and medial joint line and to lesser degree IT band.  She does have held pain with medial Maria Luisa's testing.     Rang of Motion:  She is stiff the terminal 5-10° of extension with tightness to her hamstrings.  Flexion to 110.     Strength:  4+ out of 5 with knee flexion extension.     Special Tests: Recurrent mild positive grind testing reproducing majority of her pain.  Less prominent pain with palpation of the medial joint line and seems to have less pain with medial Maria Luisa's.  Questionable click.  Mild pain with lateral Maria Luisa's. . No instability.  Screening hip testing is benign.     Skin: There are no rashes, ulcerations or lesions. Distal vascular exam appears to be intact.  She does have decrease intact sensation to the dorsums of her feet secondary to her neuropathy without ulceration.     Gait: Fluid gait.  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     Reflex 1+ knees and ankles.     Additional Comments:      Additional Examinations:  Contralateral Exam: Examination of the right knee reveals intact skin.  There is no focal tenderness.  The patient demonstrates full painless range of motion with regards to flexion and extension.  Strength is 5/5 thorough out all planes.  Ligamentous stability is grossly intact.  She does have patellofemoral crepitation at best only a trace knee joint effusion on the right at this point. [place] grind testing is not overwhelmingly positive and there is no substantial joint line tenderness.  Reasonable strength with tightness to her hamstrings.     Examination of the bilateral hip reveals intact skin.  The patient demonstrates full painless range of motion with regards to flexion, abduction, internal and external rotation.  There is not tenderness about the greater trochanter. Verena Cheeks is a negative straight leg raise against resistance.  Strength is 5/5 thorough out all planes.     Right Lower Extremity: Examination of the right lower extremity does not show any tenderness, deformity or injury.  Range of motion is unremarkable. Verena Cheeks is no gross instability.  There are no rashes, ulcerations or lesions.  Strength and tone are normal.  Left Lower Extremity: Examination of the left lower extremity does not show any tenderness, deformity or injury.  Range of motion is unremarkable.  There is no gross instability.  There are no rashes, ulcerations or lesions.  Strength and tone are normal.        Diagnostic Test Findings: Bilateral knee AP and PA weightbearing sunrise and lateral films were dated today and does show mild to moderate medial compartment narrowing with patellofemoral arthropathy and spurring          Assessment & Plan:    Encounter Diagnoses   Name Primary?  Primary osteoarthritis of right knee Yes    Chondromalacia of right patella     Chronic pain of right knee     Chronic pain of left knee        Orders Placed This Encounter   Procedures    XR KNEE LEFT (MIN 4 VIEWS)     Standing Status:   Future     Number of Occurrences:   1     Standing Expiration Date:   8/31/2022     Order Specific Question:   Reason for exam:     Answer:   PAIN    XR KNEE RIGHT (MIN 4 VIEWS)     Standing Status:   Future     Number of Occurrences:   1     Standing Expiration Date:   8/31/2022     Order Specific Question:   Reason for exam:     Answer:   PAIN    20610 - [place] DRAIN/INJECT LARGE JOINT/BURSA    EUFLEXXA INJ PER DOSE     PREFERRED FORMULARY [address]     Standing Status:   Future     Standing Expiration Date:   8/31/2022         Treatment Plan:  Treatment options were discussed with Nico Quintin. Clinically at this time she  become a little bit more sore over the last 4 to 5 months but there is no history of actual injury or no activity prior to becoming symptomatic. With her weightbearing pain at 5-6 out of 10, we did opt to inject her knees bilaterally today using 2 cc of Celestone, 2 cc Marcaine, 1 cc Xylocaine. We did have a discussion regarding repeating viscosupplementation with Orthovisc to her knees bilaterally which I think would be very wise as this is helped her substantially in the past.  She may utilize her Voltaren gel we did review her patella protection program today. We will see her back in a couple of weeks to consider repeat viscosupplementation to her knees bilaterally. Icing and activity modification was discussed. She will contact us in the interim with questions or concerns. This dictation was performed with a verbal recognition program (DRAGON) and it was checked for errors. It is possible that there are still dictated errors within this office note. If so, please bring any errors to my attention for an addendum. All efforts were made to ensure that this office note is accurate.

## 2021-08-31 NOTE — TELEPHONE ENCOUNTER
CALLED PATIENT BACK IN REGARDS TO HER EUFLEXXA INJECTIONS. PATIENT IS GOING TO CALL BACK WHEN SHE IS READY TO SCHEDULE.

## 2021-11-09 ENCOUNTER — OFFICE VISIT (OUTPATIENT)
Dept: ORTHOPEDIC SURGERY | Age: 76
End: 2021-11-09
Payer: MEDICARE

## 2021-11-09 VITALS — BODY MASS INDEX: 38.22 KG/M2 | HEIGHT: 67 IN

## 2021-11-09 DIAGNOSIS — M22.42 CHONDROMALACIA OF BOTH PATELLAE: ICD-10-CM

## 2021-11-09 DIAGNOSIS — M25.562 CHRONIC PAIN OF LEFT KNEE: ICD-10-CM

## 2021-11-09 DIAGNOSIS — G89.29 CHRONIC PAIN OF LEFT KNEE: ICD-10-CM

## 2021-11-09 DIAGNOSIS — M17.0 PRIMARY OSTEOARTHRITIS OF BOTH KNEES: Primary | ICD-10-CM

## 2021-11-09 DIAGNOSIS — M22.41 CHONDROMALACIA OF BOTH PATELLAE: ICD-10-CM

## 2021-11-09 DIAGNOSIS — M25.561 CHRONIC PAIN OF RIGHT KNEE: ICD-10-CM

## 2021-11-09 DIAGNOSIS — G89.29 CHRONIC PAIN OF RIGHT KNEE: ICD-10-CM

## 2021-11-09 PROCEDURE — 20610 DRAIN/INJ JOINT/BURSA W/O US: CPT | Performed by: FAMILY MEDICINE

## 2021-11-09 NOTE — PROGRESS NOTES
Chief Complaint  Knee Pain ([address] #1 BILATERAL KNEES)      Rosi Guaman is a 78 y.o. female is a very pleasant white female very nice patient of  Dr. Vale Baxter who is a reasonably well-controlled diabetic with her last hemoglobin A1c at 6.1 who has a history of renal disease and neuropathy as well who is being seen today for follow-up on her bilateral knee osteoarthritis with patellofemoral arthropathy.  We had seen her last in the office on 31 2021 and we last completed Orthovisc to her right knee on 8/27/2020. History of Present Illness for Follow Up Patient:      Rosalio Smith is being seen in follow up today for chronic left knee pain. The patient has completed Visco supplementation with Orthovisc to her right knee on 8/27/2020. Sp Das This did help her substantially. She has noticed increasing soreness of the leg but there is no interim history of injury or no activity. She has had difficulty last 4-5 months with pain in her knee. There is no history of actual injury no activity prior to becoming symptomatic but she has able to get back in the [employer] to exercise. She continues to deny pain at rest but will have 5 out of 10 pain with distance walking which does choco relatively quickly after resting. No night pain. Positional changes distance walking and going up and down stairs can produce pain in the range of 4-5 out of 10 but thankfully she is not having a great deal of rest pain. Activities relieving current symptoms include rest and using topical compound cream. Treatment to date includes: rest, ice, NSAID- topical Kemi gel over-the-counter, physical therapy, home exercises, knee bracing, and cortisone injections. She has not had visco-supplementation since 8/27/2020 to her right knee but is interested in once again pursuing this as she did get a good response to this in the past.  Has been through therapy previously at [facility].   Has been very compliant with her home-based exercises and has been using her cream more consistently. . Denies locking catching or true instability symptoms. Last saw Agnes Hidden in the office on 8/31/2021 and was continued on conservative treatment for bilateral knee osteoarthritis with patellofemoral arthropathy. Initially treatment she did have a temporary improvement of her symptoms which become a little bit more sore lately with pain symptoms being only about a 2-3 out of 10 currently. She continues to have pain with distance walking but in particular repetitive stair climbing. We discussed potentially repeating viscosupplementation and she does seem to be interested in doing this today. She has been episodically compliant with her home-based exercise program but he previously been through physical therapy at [facility]. She has been using her Voltaren gel denies locking catching or true instability symptoms. She does believe she has gotten benefit from viscosupplementation in the past.    Attest: I have reviewed and attest the documentation of the HPI documented by my . I will make any changes if necessary. Enc Date: 11/9/2021  Time: 2:44 PM  Provider: Cosme Jansen MD        Social History     Tobacco Use    Smoking status: Former Smoker    Smokeless tobacco: Never Used    Tobacco comment: quit 22 yrs ago   Vaping Use    Vaping Use: Never used   Substance Use Topics    Alcohol use: No     Alcohol/week: 0.0 standard drinks    Drug use: No        Review of Systems  Pertinent items are noted in HPI  Review of systems reviewed from Patient History Form dated on 1/21/2020 and available in the patient's chart under the Media tab. Vital Signs     Ht 5' 7\" (1.702 m)   BMI 38.22 kg/m²       General Exam:   Constitutional: Patient is adequately groomed with no evidence of malnutrition  DTRs: Deep tendon reflexes are intact  Mental Status: The patient is oriented to time, place and person. The patient's mood and affect are appropriate.   Lymphatic: The lymphatic examination bilaterally reveals all areas to be without enlargement or induration. Vascular: Examination reveals no swelling or calf tenderness. Peripheral pulses are palpable and 2+. Neurological: The patient has good coordination. There is no weakness or sensory deficit. Left knee Examination  Inspection:  There is no high-grade deformity although she does have some patellofemoral crepitation With a trace to 1+ knee effusion.     Palpation:  She does have some mild focal tenderness over the medial and lateral patellofemoral facet.  She does have a mildly positive grind test and only mild pain over the lateral and medial joint line and to lesser degree IT band.  She does have held pain with medial Maria Luisa's testing.     Rang of Motion:  She is stiff the terminal 5-10° of extension with tightness to her hamstrings.  Flexion to 110.     Strength:  4+ out of 5 with knee flexion extension.     Special Tests: Recurrent mild positive grind testing reproducing majority of her pain.  Less prominent pain with palpation of the medial joint line and seems to have less pain with medial Maria Luisa's.  Questionable click.  Mild pain with lateral Maria Luisa's. . No instability.  Screening hip testing is benign.     Skin: There are no rashes, ulcerations or lesions. Distal vascular exam appears to be intact.  She does have decrease intact sensation to the dorsums of her feet secondary to her neuropathy without ulceration.     Gait: Fluid gait.  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     Reflex 1+ knees and ankles.     Additional Comments:      Additional Examinations:  Contralateral Exam: Examination of the right knee reveals intact skin.  There is no focal tenderness.  The patient demonstrates full painless range of motion with regards to flexion and extension.  Strength is 5/5 thorough out all planes.  Ligamentous stability is grossly intact.  She does have patellofemoral crepitation at best only a today.  This was performed using a standard prefilled 2 cc syringe. We we will see her back weekly over the next couple of weeks that she has had a very good response to viscosupplementation with Orthovisc in the past to her knees. .  She may utilize her Voltaren gel we did review her patella protection program today. Icing and activity modification was discussed. She will contact us in the interim with questions or concerns. This dictation was performed with a verbal recognition program (DRAGON) and it was checked for errors. It is possible that there are still dictated errors within this office note. If so, please bring any errors to my attention for an addendum. All efforts were made to ensure that this office note is accurate.

## 2021-11-15 ENCOUNTER — CLINICAL DOCUMENTATION (OUTPATIENT)
Dept: OTHER | Age: 76
End: 2021-11-15

## 2021-11-16 ENCOUNTER — OFFICE VISIT (OUTPATIENT)
Dept: ORTHOPEDIC SURGERY | Age: 76
End: 2021-11-16
Payer: MEDICARE

## 2021-11-16 VITALS — HEIGHT: 67 IN | BODY MASS INDEX: 38.22 KG/M2

## 2021-11-16 DIAGNOSIS — M17.0 PRIMARY OSTEOARTHRITIS OF BOTH KNEES: Primary | ICD-10-CM

## 2021-11-16 DIAGNOSIS — G89.29 CHRONIC PAIN OF LEFT KNEE: ICD-10-CM

## 2021-11-16 DIAGNOSIS — M22.41 CHONDROMALACIA OF BOTH PATELLAE: ICD-10-CM

## 2021-11-16 DIAGNOSIS — G89.29 CHRONIC PAIN OF RIGHT KNEE: ICD-10-CM

## 2021-11-16 DIAGNOSIS — M25.562 CHRONIC PAIN OF LEFT KNEE: ICD-10-CM

## 2021-11-16 DIAGNOSIS — M25.561 CHRONIC PAIN OF RIGHT KNEE: ICD-10-CM

## 2021-11-16 DIAGNOSIS — M22.42 CHONDROMALACIA OF BOTH PATELLAE: ICD-10-CM

## 2021-11-16 PROCEDURE — 20610 DRAIN/INJ JOINT/BURSA W/O US: CPT | Performed by: FAMILY MEDICINE

## 2021-11-16 NOTE — PROGRESS NOTES
CC:  FU Knee Osteoarthritis with Viscosupplementation       HPI: Melodie Russell is a very pleasant 68-year-old white female reasonably well-controlled diabetic with history of neuropathy who is being seen today to continue with her Orthovisc series for her right knee underlying osteoarthritis with chondromalacia. She has noticed a moderate to significant improvement overall of her knee pain symptoms. She did have to miss her appointment last week as she was at home sick with a stomach virus. She is here today for her second Orthovisc injection. She seems to be noticing less popping and grinding. She has not had locking or catching. She has done reasonably well with physical supplementation in the past.      PE: no substantial change in exam.    Assessment:  Knee Osteoarthritis with viscosupplementation      PROCEDURE NOTE:    PRE-PROCEDURE DIAGNOSIS: DJD knee    POST-PROCEDURE DIAGNOSIS: DJD knee    Injection #2 of Orthovisc . PROCEDURE:  With the patient's permission, her right knee was prepped in standard sterile fashion with Betadine and Alcohol and the prefilled 2cc injection of Orthovisc was injected intra-articularly into the right knee via a superolateral approach without difficulty. The patient tolerated this well without difficulty. A band-aid was applied. POST-PROCEDURE INSTRUCTIONS GIVEN TO PATIENT: The patient was advised to ice the knee for 15-20 minutes to relieve any injection site related pain. FOLLOW-UP: as directed. She will continue with her Voltaren gel as well as periodic bracing and physical therapy exercises. She'll be seen back next week to complete her Orthovisc series. She will contact us with questions or concerns in the interim.

## 2021-11-23 ENCOUNTER — OFFICE VISIT (OUTPATIENT)
Dept: ORTHOPEDIC SURGERY | Age: 76
End: 2021-11-23
Payer: MEDICARE

## 2021-11-23 VITALS — BODY MASS INDEX: 38.22 KG/M2 | HEIGHT: 67 IN

## 2021-11-23 DIAGNOSIS — G89.29 CHRONIC PAIN OF LEFT KNEE: ICD-10-CM

## 2021-11-23 DIAGNOSIS — M25.562 CHRONIC PAIN OF LEFT KNEE: ICD-10-CM

## 2021-11-23 DIAGNOSIS — M17.0 PRIMARY OSTEOARTHRITIS OF BOTH KNEES: Primary | ICD-10-CM

## 2021-11-23 DIAGNOSIS — G89.29 CHRONIC PAIN OF RIGHT KNEE: ICD-10-CM

## 2021-11-23 DIAGNOSIS — M22.41 CHONDROMALACIA OF BOTH PATELLAE: ICD-10-CM

## 2021-11-23 DIAGNOSIS — M22.42 CHONDROMALACIA OF BOTH PATELLAE: ICD-10-CM

## 2021-11-23 DIAGNOSIS — M25.561 CHRONIC PAIN OF RIGHT KNEE: ICD-10-CM

## 2021-11-23 PROCEDURE — 20610 DRAIN/INJ JOINT/BURSA W/O US: CPT | Performed by: FAMILY MEDICINE

## 2021-11-23 NOTE — PROGRESS NOTES
CC:  FU Knee Osteoarthritis with Viscosupplementation       HPI: Ruthie Christopher is a very pleasant 82-year-old white female reasonably well-controlled diabetic with history of neuropathy who is being seen today to continue with her Orthovisc series for her right knee underlying osteoarthritis with chondromalacia. She has noticed a moderate to significant improvement overall of her knee pain symptoms. She did have to miss her appointment couple of weeks ago as she was at home sick with a stomach virus. She is here today for her second Orthovisc injection. She seems to be noticing less popping and grinding. She has not had locking or catching. She has done reasonably well with physical supplementation in the past.      PE: no substantial change in exam.    Assessment:  Knee Osteoarthritis with viscosupplementation      PROCEDURE NOTE:    PRE-PROCEDURE DIAGNOSIS: DJD knee    POST-PROCEDURE DIAGNOSIS: DJD knee    Injection #3 of Orthovisc . PROCEDURE:  With the patient's permission, her right knee was prepped in standard sterile fashion with Betadine and Alcohol and the prefilled 2cc injection of Orthovisc was injected intra-articularly into the right knee via a superolateral approach without difficulty. The patient tolerated this well without difficulty. A band-aid was applied. POST-PROCEDURE INSTRUCTIONS GIVEN TO PATIENT: The patient was advised to ice the knee for 15-20 minutes to relieve any injection site related pain. FOLLOW-UP: as directed. She will continue with her Voltaren gel as well as periodic bracing and physical therapy exercises. She is aware that she can repeat viscosupplementation at 6-month intervals or interim steroid injections if necessary. She will contact us with questions or concerns in the interim.

## 2022-01-26 ENCOUNTER — TELEPHONE (OUTPATIENT)
Dept: ORTHOPEDIC SURGERY | Age: 77
End: 2022-01-26

## 2022-01-26 NOTE — TELEPHONE ENCOUNTER
Spoke with patient in regards to the [address] [place] [address] knee joint pain program. Patient stated that her knee pain is doing ok at this time. She uses the medrol dose pack as prescribed and seems to have good results with it. I let her know that if symptoms worsen or fail to improve, she can give us a call back at [phone].

## 2022-05-31 ENCOUNTER — HOSPITAL ENCOUNTER (OUTPATIENT)
Dept: ULTRASOUND IMAGING | Age: 77
Discharge: HOME | End: 2022-05-31
Payer: MEDICARE

## 2022-05-31 DIAGNOSIS — N20.0 CALCULUS OF KIDNEY: ICD-10-CM

## 2022-05-31 PROCEDURE — 76770 US EXAM ABDO BACK WALL COMP: CPT

## 2022-10-27 ENCOUNTER — OFFICE VISIT (OUTPATIENT)
Dept: ORTHOPEDIC SURGERY | Age: 77
End: 2022-10-27
Payer: MEDICARE

## 2022-10-27 DIAGNOSIS — G89.29 CHRONIC PAIN OF LEFT KNEE: ICD-10-CM

## 2022-10-27 DIAGNOSIS — M22.41 CHONDROMALACIA OF BOTH PATELLAE: ICD-10-CM

## 2022-10-27 DIAGNOSIS — M17.0 PRIMARY OSTEOARTHRITIS OF BOTH KNEES: Primary | ICD-10-CM

## 2022-10-27 DIAGNOSIS — M25.562 CHRONIC PAIN OF LEFT KNEE: ICD-10-CM

## 2022-10-27 DIAGNOSIS — G89.29 CHRONIC PAIN OF RIGHT KNEE: ICD-10-CM

## 2022-10-27 DIAGNOSIS — M25.561 CHRONIC PAIN OF RIGHT KNEE: ICD-10-CM

## 2022-10-27 DIAGNOSIS — M22.42 CHONDROMALACIA OF BOTH PATELLAE: ICD-10-CM

## 2022-10-27 PROCEDURE — 20610 DRAIN/INJ JOINT/BURSA W/O US: CPT | Performed by: FAMILY MEDICINE

## 2022-10-27 PROCEDURE — 1123F ACP DISCUSS/DSCN MKR DOCD: CPT | Performed by: FAMILY MEDICINE

## 2022-10-27 PROCEDURE — 3017F COLORECTAL CA SCREEN DOC REV: CPT | Performed by: FAMILY MEDICINE

## 2022-10-27 PROCEDURE — G8417 CALC BMI ABV UP PARAM F/U: HCPCS | Performed by: FAMILY MEDICINE

## 2022-10-27 PROCEDURE — 1090F PRES/ABSN URINE INCON ASSESS: CPT | Performed by: FAMILY MEDICINE

## 2022-10-27 PROCEDURE — 1036F TOBACCO NON-USER: CPT | Performed by: FAMILY MEDICINE

## 2022-10-27 PROCEDURE — G8484 FLU IMMUNIZE NO ADMIN: HCPCS | Performed by: FAMILY MEDICINE

## 2022-10-27 PROCEDURE — G8400 PT W/DXA NO RESULTS DOC: HCPCS | Performed by: FAMILY MEDICINE

## 2022-10-27 PROCEDURE — 99213 OFFICE O/P EST LOW 20 MIN: CPT | Performed by: FAMILY MEDICINE

## 2022-10-27 PROCEDURE — G8427 DOCREV CUR MEDS BY ELIG CLIN: HCPCS | Performed by: FAMILY MEDICINE

## 2022-10-27 RX ORDER — BUPIVACAINE HYDROCHLORIDE 2.5 MG/ML
2 INJECTION, SOLUTION INFILTRATION; PERINEURAL ONCE
Status: COMPLETED | OUTPATIENT
Start: 2022-10-27 | End: 2022-10-27

## 2022-10-27 RX ORDER — BETAMETHASONE SODIUM PHOSPHATE AND BETAMETHASONE ACETATE 3; 3 MG/ML; MG/ML
12 INJECTION, SUSPENSION INTRA-ARTICULAR; INTRALESIONAL; INTRAMUSCULAR; SOFT TISSUE ONCE
Status: COMPLETED | OUTPATIENT
Start: 2022-10-27 | End: 2022-10-27

## 2022-10-27 RX ORDER — LIDOCAINE HYDROCHLORIDE 10 MG/ML
1 INJECTION, SOLUTION INFILTRATION; PERINEURAL ONCE
Status: COMPLETED | OUTPATIENT
Start: 2022-10-27 | End: 2022-10-27

## 2022-10-27 RX ORDER — METHOCARBAMOL 500 MG/1
TABLET, FILM COATED ORAL
COMMUNITY
Start: 2022-09-21

## 2022-10-27 RX ADMIN — BETAMETHASONE SODIUM PHOSPHATE AND BETAMETHASONE ACETATE 12 MG: 3; 3 INJECTION, SUSPENSION INTRA-ARTICULAR; INTRALESIONAL; INTRAMUSCULAR; SOFT TISSUE at 14:06

## 2022-10-27 RX ADMIN — LIDOCAINE HYDROCHLORIDE 1 ML: 10 INJECTION, SOLUTION INFILTRATION; PERINEURAL at 14:08

## 2022-10-27 RX ADMIN — BUPIVACAINE HYDROCHLORIDE 5 MG: 2.5 INJECTION, SOLUTION INFILTRATION; PERINEURAL at 14:07

## 2022-10-27 NOTE — PROGRESS NOTES
Chief Complaint  Knee Pain (FU LOLIS KNEES )    Follow-up recurrent right much greater than left knee pain with known history of mild to moderate osteoarthritis and chondromalacia patella with acute worsening right knee pain 10/23/2022    Candida Subramanian is a 78 y.o. female is a very pleasant white female very nice patient of  Dr. Norma Cohen who is a reasonably well-controlled diabetic with her last hemoglobin A1c at 6.1 who has a history of renal disease and neuropathy as well who is being seen today for follow-up on her bilateral knee osteoarthritis with patellofemoral arthropathy. We had seen her last in the office on 11/23/2021 will be completed Orthovisc to her right knee. History of Present Illness for Follow Up Patient:      Jackson Hemphill is being seen in follow up today for chronic left knee pain. The patient has completed Visco supplementation with Orthovisc to her right knee on 11/23/2021 this did help her substantially. She has noticed increasing soreness of the leg but there is no interim history of injury or no activity. She was doing very well with regard to her knees bilaterally until rather acutely on 10/23/2022 she began noticed increasing soreness to the right much greater than left knee. There is no history of injury fall or trauma although she did do quite a bit of extra walking while she moved early in October 2022. She states she woke up on 10/23/2024 with soreness to the knee which progressively worsened to the point where she had a definite difficult time bearing weight with swelling and motion loss with both posterior and some medial knee pain on the right. They did go to urgent care and she requested a steroid injection which they declined and placed her to Medrol pack which is helped her substantially which she rates at about 75% at least.  She was initially using a walker. She has not been requiring this recently and is having more achy discomfort on her contralateral left knee. .  She is now having more achy type discomfort and does try to go to the gym and workout 4 to 5 days/week if she can. She has been using her Voltaren gel. There is no history of injury fall or trauma. She had been through therapy previously at [facility]. Denies locking catching or true instability symptoms. She is being seen today for orthopedic and sports consultation with reevaluation. Attest: I have reviewed and attest the documentation of the HPI documented by my . I will make any changes if necessary. Enc Date: 10/27/2022  Time: 1:37 PM  Provider: Surinder Chamberlain MD        Social History     Tobacco Use    Smoking status: Former    Smokeless tobacco: Never    Tobacco comments:     quit 22 yrs ago   Vaping Use    Vaping Use: Never used   Substance Use Topics    Alcohol use: No     Alcohol/week: 0.0 standard drinks    Drug use: No        Review of Systems  Pertinent items are noted in HPI  Review of systems reviewed from Patient History Form dated on 10/27/2022 and available in the patient's chart under the Media tab. Vital Signs     There were no vitals taken for this visit. General Exam:   Constitutional: Patient is adequately groomed with no evidence of malnutrition  DTRs: Deep tendon reflexes are intact  Mental Status: The patient is oriented to time, place and person. The patient's mood and affect are appropriate. Lymphatic: The lymphatic examination bilaterally reveals all areas to be without enlargement or induration. Vascular: Examination reveals no swelling or calf tenderness. Peripheral pulses are palpable and 2+. Neurological: The patient has good coordination. There is no weakness or sensory deficit. Left knee Examination  Inspection:  There is no high-grade deformity although she does have some patellofemoral crepitation With a trace to 1+ knee effusion.      Palpation:  She does have some mild focal tenderness over the medial and lateral patellofemoral facet.  She does have a mildly positive grind test and only mild pain over the lateral and medial joint line and to lesser degree IT band. She does have held pain with medial Maria Luisa's testing. Rang of Motion:  She is stiff the terminal 5-10° of extension with tightness to her hamstrings. Flexion to 110. Strength:  4+ out of 5 with knee flexion extension. Special Tests: Recurrent mild positive grind testing reproducing majority of her pain. Less prominent pain with palpation of the medial joint line and seems to have less pain with medial Maria Luisa's. Questionable click. Mild pain with lateral Maria Luisa's. . No instability. Screening hip testing is benign. Skin: There are no rashes, ulcerations or lesions. Distal vascular exam appears to be intact. She does have decrease intact sensation to the dorsums of her feet secondary to her neuropathy without ulceration. Gait: Fluid gait. 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 Reflex 1+ knees and ankles. Additional Comments:      Additional Examinations:  Contralateral Exam: Examination of the right knee reveals intact skin. There is no focal tenderness. The patient demonstrates full painless range of motion with regards to flexion and extension. Strength is 5/5 thorough out all planes. Ligamentous stability is grossly intact. She does have patellofemoral crepitation at best only a trace knee joint effusion on the right at this point. Her grind testing is not overwhelmingly positive and there is no substantial joint line tenderness. Reasonable strength with tightness to her hamstrings. Examination of the bilateral hip reveals intact skin. The patient demonstrates full painless range of motion with regards to flexion, abduction, internal and external rotation. There is not tenderness about the greater trochanter. There is a negative straight leg raise against resistance. Strength is 5/5 thorough out all planes. Right Lower Extremity: Examination of the right lower extremity does not show any tenderness, deformity or injury. Range of motion is unremarkable. There is no gross instability. There are no rashes, ulcerations or lesions. Strength and tone are normal.  Left Lower Extremity: Examination of the left lower extremity does not show any tenderness, deformity or injury. Range of motion is unremarkable. There is no gross instability. There are no rashes, ulcerations or lesions. Strength and tone are normal.        Diagnostic Test Findings: Bilateral knee AP and PA weightbearing sunrise and lateral films were reviewed from 8/31/2022 and does show mild to moderate medial compartment narrowing with patellofemoral arthropathy and spurring          Assessment & Plan:    No diagnosis found. No orders of the defined types were placed in this encounter. Treatment Plan:  Treatment options were discussed with Clarita Ham. We did review her previous plain films but given lack of trauma I think we can hold off on updating these today. He was doing very well following completion of viscosupplementation last to her knee which was finished on 11/23/2021 on the right. She did move earlier this month but became quite symptomatic on 10/23/2022. Since starting a couple days on the Medrol pack this is improved about 75% but with the severity of her pain earlier this week, we did opt to inject her more symptomatic right knee using 2 cc of Celestone, 2 cc of Marcaine, 1 cc Xylocaine. She is certainly eligible to repeat her viscosupplementation with Orthovisc to her knees bilaterally which I think she should strongly consider as she did quite well up until recently with regard to her knees even with moving. She will continue with her exercise and stretching program as well as her Voltaren gel 4 g 4 times daily.   We will see her back in a few weeks for follow-up to ensure that she is improving to consider viscosupplementation. She will contact us in the interim with questions or concerns. This dictation was performed with a verbal recognition program (DRAGON) and it was checked for errors. It is possible that there are still dictated errors within this office note. If so, please bring any errors to my attention for an addendum. All efforts were made to ensure that this office note is accurate.

## 2022-11-15 ENCOUNTER — TELEPHONE (OUTPATIENT)
Dept: ORTHOPEDIC SURGERY | Age: 77
End: 2022-11-15

## 2022-11-15 NOTE — TELEPHONE ENCOUNTER
SPOKE TO THE PT LETTING HER KNOW SHE IS ELIGIBLE. PT CALLING INSURANCE ABOUT CO PAY.      [facility] ELIGIBLE 10/27/22-01/27/2023

## 2022-11-29 ENCOUNTER — OFFICE VISIT (OUTPATIENT)
Dept: ORTHOPEDIC SURGERY | Age: 77
End: 2022-11-29

## 2022-11-29 VITALS — HEIGHT: 67 IN | BODY MASS INDEX: 38.3 KG/M2 | WEIGHT: 244 LBS

## 2022-11-29 DIAGNOSIS — M25.562 CHRONIC PAIN OF LEFT KNEE: ICD-10-CM

## 2022-11-29 DIAGNOSIS — M22.41 CHONDROMALACIA OF BOTH PATELLAE: ICD-10-CM

## 2022-11-29 DIAGNOSIS — M25.561 CHRONIC PAIN OF RIGHT KNEE: ICD-10-CM

## 2022-11-29 DIAGNOSIS — M22.42 CHONDROMALACIA OF BOTH PATELLAE: ICD-10-CM

## 2022-11-29 DIAGNOSIS — G89.29 CHRONIC PAIN OF LEFT KNEE: ICD-10-CM

## 2022-11-29 DIAGNOSIS — M17.0 PRIMARY OSTEOARTHRITIS OF BOTH KNEES: Primary | ICD-10-CM

## 2022-11-29 DIAGNOSIS — G89.29 CHRONIC PAIN OF RIGHT KNEE: ICD-10-CM

## 2022-11-29 NOTE — PROGRESS NOTES
Chief Complaint  Knee Pain (Orthovisc #1 B knee)    Follow-up recurrent right much greater than left knee pain with known history of mild to moderate osteoarthritis and chondromalacia patella with acute worsening right knee pain 10/23/2022    Vasyl Ang is a 78 y.o. female is a very pleasant white female very nice patient of  Dr. Eileen Moss who is a reasonably well-controlled diabetic with her last hemoglobin A1c at 6.1 who has a history of renal disease and neuropathy as well who is being seen today for follow-up on her bilateral knee osteoarthritis with patellofemoral arthropathy. We had seen her last in the office on 11/23/2021 will be completed Orthovisc to her right knee. History of Present Illness for Follow Up Patient:      Johanny Powell is being seen in follow up today for chronic left knee pain. The patient has completed Visco supplementation with Orthovisc to her right knee on 11/23/2021 this did help her substantially. She has noticed increasing soreness of the leg but there is no interim history of injury or no activity. She was doing very well with regard to her knees bilaterally until rather acutely on 10/23/2022 she began noticed increasing soreness to the right much greater than left knee. There is no history of injury fall or trauma although she did do quite a bit of extra walking while she moved early in October 2022. She states she woke up on 10/23/2024 with soreness to the knee which progressively worsened to the point where she had a definite difficult time bearing weight with swelling and motion loss with both posterior and some medial knee pain on the right. They did go to urgent care and she requested a steroid injection which they declined and placed her to Medrol pack which is helped her substantially which she rates at about 75% at least.  She was initially using a walker. She has not been requiring this recently and is having more achy discomfort on her contralateral left knee. Pink Julio She is now having more achy type discomfort and does try to go to the gym and workout 4 to 5 days/week if she can. She has been using her Voltaren gel. There is no history of injury fall or trauma. She had been through therapy previously at [facility]. Denies locking catching or true instability symptoms. She is being seen today for orthopedic and sports consultation with reevaluation. With I saw Kylah in the office on 10/27/2022 for her bilateral knee osteoarthritis with patellofemoral arthropathy. She presents back today stating that she is doing somewhat better than she was her last visit and denies locking catching or true instability symptoms. She will have some soreness with use and difficulty going downstairs. She has noted that if she goes downstairs backwards she does not have pain the majority of her discomfort is anterior to lesser degree medial in nature. She has been using her Voltaren gel and denies locking catching or true instability symptoms. She would like to consider viscosupplementation once again. She did have this to her right knee last completed 11/23/2021 with improvement and this is her first time trying this on the left. Pain Assessment  Location of Pain: Knee  Location Modifiers: Left, Right  Severity of Pain: 0  Quality of Pain: Other (Comment)  Frequency of Pain: Other (Comment)  Limiting Behavior: Yes  Result of Injury: No  Work-Related Injury: No  Are there other pain locations you wish to document?: No     Attest: I have reviewed and attest the documentation of the HPI documented by my . I will make any changes if necessary.      Enc Date: 11/29/2022  Time: 1:45 PM  Provider: Pierre Carson MD        Social History     Tobacco Use    Smoking status: Former    Smokeless tobacco: Never    Tobacco comments:     quit 22 yrs ago   Vaping Use    Vaping Use: Never used   Substance Use Topics    Alcohol use: No     Alcohol/week: 0.0 standard drinks    Drug use: No        Review of Systems  Pertinent items are noted in HPI  Review of systems reviewed from Patient History Form dated on 10/27/2022 and available in the patient's chart under the Media tab. Vital Signs     Ht 5' 7\" (1.702 m)   Wt 244 lb (110.7 kg)   BMI 38.22 kg/m²       General Exam:   Constitutional: Patient is adequately groomed with no evidence of malnutrition  DTRs: Deep tendon reflexes are intact  Mental Status: The patient is oriented to time, place and person. The patient's mood and affect are appropriate. Lymphatic: The lymphatic examination bilaterally reveals all areas to be without enlargement or induration. Vascular: Examination reveals no swelling or calf tenderness. Peripheral pulses are palpable and 2+. Neurological: The patient has good coordination. There is no weakness or sensory deficit. Left knee Examination  Inspection:  There is no high-grade deformity although she does have some patellofemoral crepitation With a trace residual knee effusion. Palpation:  She does have some mild focal tenderness over the medial and lateral patellofemoral facet. She does have a mildly positive grind test and only mild pain over the lateral and medial joint line and to lesser degree IT band. She does have held pain with medial Maria Luisa's testing. Rang of Motion:  She is stiff the terminal 5-10° of extension with tightness to her hamstrings. Flexion to 110. Strength:  4+ out of 5 with knee flexion extension. Special Tests: She has less pain with patellar grind testing reproducing majority of her pain. Less prominent pain with palpation of the medial joint line and seems to have less pain with medial Mari aLuisa's. Questionable click. Mild pain with lateral Maria Luisa's. . No instability. Screening hip testing is benign. Skin: There are no rashes, ulcerations or lesions. Distal vascular exam appears to be intact.   She does have decrease intact sensation to the dorsums of her feet secondary to her neuropathy without ulceration. Gait: Fluid gait. 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 Reflex 1+ knees and ankles. Additional Comments:      Additional Examinations:  Contralateral Exam: Examination of the right knee reveals intact skin. There is no focal tenderness. The patient demonstrates full painless range of motion with regards to flexion and extension. Strength is 5/5 thorough out all planes. Ligamentous stability is grossly intact. She does have patellofemoral crepitation at best only a trace knee joint effusion on the right at this point. Her grind testing is not overwhelmingly positive and there is no substantial joint line tenderness. Reasonable strength with tightness to her hamstrings. Examination of the bilateral hip reveals intact skin. The patient demonstrates full painless range of motion with regards to flexion, abduction, internal and external rotation. There is not tenderness about the greater trochanter. There is a negative straight leg raise against resistance. Strength is 5/5 thorough out all planes. Right Lower Extremity: Examination of the right lower extremity does not show any tenderness, deformity or injury. Range of motion is unremarkable. There is no gross instability. There are no rashes, ulcerations or lesions. Strength and tone are normal.  Left Lower Extremity: Examination of the left lower extremity does not show any tenderness, deformity or injury. Range of motion is unremarkable. There is no gross instability. There are no rashes, ulcerations or lesions. Strength and tone are normal.        Diagnostic Test Findings: Bilateral knee AP and PA weightbearing sunrise and lateral films were reviewed from 8/31/2022 and does show mild to moderate medial compartment narrowing with patellofemoral arthropathy and spurring          Assessment & Plan:    Encounter Diagnoses   Name Primary?     Primary osteoarthritis of both knees Yes    Chondromalacia of both patellae     Chronic pain of right knee     Chronic pain of left knee          Orders Placed This Encounter   Procedures    RI ARTHROCENTESIS ASPIR&/INJ MAJOR JT/BURSA W/O US           Treatment Plan:  Treatment options were discussed with Arlene Rubio. We did review her previous plain films but given lack of trauma I think we can hold off on updating these today. He was doing very well following completion of viscosupplementation last to her knee which was finished on 11/23/2021 on the right. She did move earlier this month but became quite symptomatic on 10/23/2022. While her knee has improved moderately, she still is having primarily anterior pain with going down stairs. After discussion of pros and cons of viscosupplementation, she did receive her first injection of Orthovisc to her knees bilaterally. This was performed intra-articularly using a standard prefilled 2 cc syringe. We will see her back each next 2 weeks to finish up her Orthovisc series. She will continue with her exercise and stretching program as well as icing. She will continue with her Voltaren gel 4 g 4 times daily and we will see her back each the next 2 weeks to finish up her viscosupplementation series. Once again she had next response to this previously on the right in the past and this is her first time trying this on the left. She will contact us in the interim with questions or concerns. This dictation was performed with a verbal recognition program (DRAGON) and it was checked for errors. It is possible that there are still dictated errors within this office note. If so, please bring any errors to my attention for an addendum. All efforts were made to ensure that this office note is accurate.

## 2022-12-06 ENCOUNTER — OFFICE VISIT (OUTPATIENT)
Dept: ORTHOPEDIC SURGERY | Age: 77
End: 2022-12-06

## 2022-12-06 VITALS — BODY MASS INDEX: 38.3 KG/M2 | WEIGHT: 244 LBS | HEIGHT: 67 IN

## 2022-12-06 DIAGNOSIS — M25.561 CHRONIC PAIN OF RIGHT KNEE: ICD-10-CM

## 2022-12-06 DIAGNOSIS — G89.29 CHRONIC PAIN OF LEFT KNEE: ICD-10-CM

## 2022-12-06 DIAGNOSIS — M22.41 CHONDROMALACIA OF BOTH PATELLAE: ICD-10-CM

## 2022-12-06 DIAGNOSIS — M22.42 CHONDROMALACIA OF BOTH PATELLAE: ICD-10-CM

## 2022-12-06 DIAGNOSIS — G89.29 CHRONIC PAIN OF RIGHT KNEE: ICD-10-CM

## 2022-12-06 DIAGNOSIS — M17.0 PRIMARY OSTEOARTHRITIS OF BOTH KNEES: Primary | ICD-10-CM

## 2022-12-06 DIAGNOSIS — M25.562 CHRONIC PAIN OF LEFT KNEE: ICD-10-CM

## 2022-12-06 NOTE — PROGRESS NOTES
CC:  FU Knee Osteoarthritis with Viscosupplementation      Frankie Broussard is a 78 y.o. female is a very pleasant white female very nice patient of  Dr. Joselyn Garcia who is a reasonably well-controlled diabetic with her last hemoglobin A1c at 6.1 who has a history of renal disease and neuropathy as well who is being seen today for follow-up on her bilateral knee osteoarthritis with patellofemoral arthropathy. We had seen her last in the office on 11/23/2021 will be completed Orthovisc to her right knee. History of Present Illness for Follow Up Patient:      Charu Gong is being seen in follow up today for chronic left knee pain. The patient has completed Visco supplementation with Orthovisc to her right knee on 11/23/2021 this did help her substantially. She has noticed increasing soreness of the leg but there is no interim history of injury or no activity. She was doing very well with regard to her knees bilaterally until rather acutely on 10/23/2022 she began noticed increasing soreness to the right much greater than left knee. There is no history of injury fall or trauma although she did do quite a bit of extra walking while she moved early in October 2022. She states she woke up on 10/23/2024 with soreness to the knee which progressively worsened to the point where she had a definite difficult time bearing weight with swelling and motion loss with both posterior and some medial knee pain on the right. They did go to urgent care and she requested a steroid injection which they declined and placed her to Medrol pack which is helped her substantially which she rates at about 75% at least.  She was initially using a walker. She has not been requiring this recently and is having more achy discomfort on her contralateral left knee. .  She is now having more achy type discomfort and does try to go to the gym and workout 4 to 5 days/week if she can. She has been using her Voltaren gel.   There is no history of injury fall or trauma. She had been through therapy previously at [facility]. Denies locking catching or true instability symptoms. She is being seen today for orthopedic and sports consultation with reevaluation. With I saw Delfino Salmon in the office on 10/27/2022 for her bilateral knee osteoarthritis with patellofemoral arthropathy. She presents back today stating that she is doing somewhat better than she was her last visit and denies locking catching or true instability symptoms. She will have some soreness with use and difficulty going downstairs. She has noted that if she goes downstairs backwards she does not have pain the majority of her discomfort is anterior to lesser degree medial in nature. She has been using her Voltaren gel and denies locking catching or true instability symptoms. She would like to consider viscosupplementation once again. She did have this to her right knee last completed 11/23/2021 with improvement and this is her first time trying this on the left. With I saw Delfino Salmon in the office on 11/29/2022 for her bilateral knee osteoarthritis with patellofemoral arthropathy. She is still having soreness and achiness primarily anteriorly and did have an episode where she tried to kneel down on her left knee over the weekend and had a difficult time getting to get something out from under her bed. She has been using her Voltaren gel and denies locking catching or true instability symptoms. She had previously had viscosupplementation only to her right knee completed 11/23/2021 and is here today to continue with her viscosupplementation bilaterally and this is the first time trying this on the left knee.       PE: no substantial change in exam.    Assessment:  Knee Osteoarthritis with viscosupplementation      PROCEDURE NOTE:    PRE-PROCEDURE DIAGNOSIS: DJD knee    POST-PROCEDURE DIAGNOSIS: DJD knee    Injection #2 Orthovisc bilaterally     PROCEDURE:  With the patient's permission, her bilaterally knee was prepped in standard sterile fashion with Betadine and Alcohol and the prefilled 2cc injection of Orthovisc was injected intra-articularly into the bilaterally knee via a superolateral approach without difficulty. The patient tolerated this well without difficulty. A band-aid was applied. POST-PROCEDURE INSTRUCTIONS GIVEN TO PATIENT: The patient was advised to ice the knee for 15-20 minutes to relieve any injection site related pain. FOLLOW-UP: as directed. She will continue with her Voltaren gel 4 g 4 times daily as well as her exercise program and episodic brace usage. We will see her back next week to finish up her Orthovisc series. She is typically once again done well with this on her right knee but this is the first time trying this on the left knee.   She is also experiencing some left gluteal and lateral hip discomfort but will try using her Voltaren gel and getting back into her back stretching program and we can evaluate this next week if remains problematic for her

## 2022-12-13 ENCOUNTER — OFFICE VISIT (OUTPATIENT)
Dept: ORTHOPEDIC SURGERY | Age: 77
End: 2022-12-13

## 2022-12-13 VITALS — HEIGHT: 67 IN | WEIGHT: 244 LBS | BODY MASS INDEX: 38.3 KG/M2

## 2022-12-13 DIAGNOSIS — M17.0 PRIMARY OSTEOARTHRITIS OF BOTH KNEES: Primary | ICD-10-CM

## 2022-12-13 DIAGNOSIS — M22.41 CHONDROMALACIA OF BOTH PATELLAE: ICD-10-CM

## 2022-12-13 DIAGNOSIS — G89.29 CHRONIC PAIN OF LEFT KNEE: ICD-10-CM

## 2022-12-13 DIAGNOSIS — M25.561 CHRONIC PAIN OF RIGHT KNEE: ICD-10-CM

## 2022-12-13 DIAGNOSIS — M22.42 CHONDROMALACIA OF BOTH PATELLAE: ICD-10-CM

## 2022-12-13 DIAGNOSIS — G89.29 CHRONIC PAIN OF RIGHT KNEE: ICD-10-CM

## 2022-12-13 DIAGNOSIS — M25.562 CHRONIC PAIN OF LEFT KNEE: ICD-10-CM

## 2022-12-13 NOTE — PROGRESS NOTES
CC:  FU Knee Osteoarthritis with Viscosupplementation      Bishop Knight is a 78 y.o. female is a very pleasant white female very nice patient of  Dr. Leonor Robertson who is a reasonably well-controlled diabetic with her last hemoglobin A1c at 6.1 who has a history of renal disease and neuropathy as well who is being seen today for follow-up on her bilateral knee osteoarthritis with patellofemoral arthropathy. We had seen her last in the office on 11/23/2021 will be completed Orthovisc to her right knee. History of Present Illness for Follow Up Patient:      Piedad Montano is being seen in follow up today for chronic left knee pain. The patient has completed Visco supplementation with Orthovisc to her right knee on 11/23/2021 this did help her substantially. She has noticed increasing soreness of the leg but there is no interim history of injury or no activity. She was doing very well with regard to her knees bilaterally until rather acutely on 10/23/2022 she began noticed increasing soreness to the right much greater than left knee. There is no history of injury fall or trauma although she did do quite a bit of extra walking while she moved early in October 2022. She states she woke up on 10/23/2024 with soreness to the knee which progressively worsened to the point where she had a definite difficult time bearing weight with swelling and motion loss with both posterior and some medial knee pain on the right. They did go to urgent care and she requested a steroid injection which they declined and placed her to Medrol pack which is helped her substantially which she rates at about 75% at least.  She was initially using a walker. She has not been requiring this recently and is having more achy discomfort on her contralateral left knee. .  She is now having more achy type discomfort and does try to go to the gym and workout 4 to 5 days/week if she can. She has been using her Voltaren gel.   There is no history of injury fall or trauma. She had been through therapy previously at [facility]. Denies locking catching or true instability symptoms. She is being seen today for orthopedic and sports consultation with reevaluation. With I saw Fátima Tavera in the office on 10/27/2022 for her bilateral knee osteoarthritis with patellofemoral arthropathy. She presents back today stating that she is doing somewhat better than she was her last visit and denies locking catching or true instability symptoms. She will have some soreness with use and difficulty going downstairs. She has noted that if she goes downstairs backwards she does not have pain the majority of her discomfort is anterior to lesser degree medial in nature. She has been using her Voltaren gel and denies locking catching or true instability symptoms. She would like to consider viscosupplementation once again. She did have this to her right knee last completed 11/23/2021 with improvement and this is her first time trying this on the left. With I saw Fátima Tavera in the office on 11/29/2022 for her bilateral knee osteoarthritis with patellofemoral arthropathy. She is still having soreness and achiness primarily anteriorly and did have an episode where she tried to kneel down on her left knee over the weekend and had a difficult time getting to get something out from under her bed. She has been using her Voltaren gel and denies locking catching or true instability symptoms. She had previously had viscosupplementation only to her right knee completed 11/23/2021 and is here today to continue with her viscosupplementation bilaterally and this is the first time trying this on the left knee. Saw Fátima Padgettivonne in the office on 12/6/2022 and was continued on Orthovisc injections to her knees bilaterally. She is doing much better overall.   She is here today for final Orthovisc injection and has been trying to work on exercise program has been reasonably compliant with use of her Voltaren gel and bracing. Denies locking catching or true instability symptoms. This is her first time trying viscosupplementation on the left knee but is typically helped her right knee in the past.      PE: no substantial change in exam.    Assessment:  Knee Osteoarthritis with viscosupplementation      PROCEDURE NOTE:    PRE-PROCEDURE DIAGNOSIS: DJD knee    POST-PROCEDURE DIAGNOSIS: DJD knee    Injection #2 Orthovisc bilaterally     PROCEDURE:  With the patient's permission, her bilaterally knee was prepped in standard sterile fashion with Betadine and Alcohol and the prefilled 2cc injection of Orthovisc was injected intra-articularly into the bilaterally knee via a superolateral approach without difficulty. The patient tolerated this well without difficulty. A band-aid was applied. POST-PROCEDURE INSTRUCTIONS GIVEN TO PATIENT: The patient was advised to ice the knee for 15-20 minutes to relieve any injection site related pain. FOLLOW-UP: as directed. She will continue with her Voltaren gel 4 g 4 times daily as well as her exercise program and episodic brace usage. She is aware that take 6 to 8 weeks to see maximum efficacy from her Orthovisc injection series. She is typically once again done well with this on her right knee but this is the first time trying this on the left knee. She is also experiencing some left gluteal and lateral hip discomfort but will try using her Voltaren gel and getting back into her back stretching program and more than likely this does relate to her chronic mechanical back pain and does have an appointment with her pain management physician in the next week or 2 will bring this up to them.   She will be seen back in 6 months but was encouraged to contact us in the interim with questions or concerns

## 2023-08-01 ENCOUNTER — OFFICE VISIT (OUTPATIENT)
Dept: ORTHOPEDIC SURGERY | Age: 78
End: 2023-08-01

## 2023-08-01 VITALS — WEIGHT: 251 LBS | HEIGHT: 67 IN | BODY MASS INDEX: 39.39 KG/M2

## 2023-08-01 DIAGNOSIS — M22.42 CHONDROMALACIA OF BOTH PATELLAE: ICD-10-CM

## 2023-08-01 DIAGNOSIS — G89.29 CHRONIC PAIN OF LEFT KNEE: ICD-10-CM

## 2023-08-01 DIAGNOSIS — M25.561 CHRONIC PAIN OF RIGHT KNEE: ICD-10-CM

## 2023-08-01 DIAGNOSIS — G89.29 CHRONIC PAIN OF RIGHT KNEE: ICD-10-CM

## 2023-08-01 DIAGNOSIS — M22.41 CHONDROMALACIA OF BOTH PATELLAE: ICD-10-CM

## 2023-08-01 DIAGNOSIS — M25.562 CHRONIC PAIN OF LEFT KNEE: ICD-10-CM

## 2023-08-01 DIAGNOSIS — M17.0 PRIMARY OSTEOARTHRITIS OF BOTH KNEES: Primary | ICD-10-CM

## 2023-08-01 RX ORDER — BUPIVACAINE HYDROCHLORIDE 2.5 MG/ML
4 INJECTION, SOLUTION INFILTRATION; PERINEURAL ONCE
Status: COMPLETED | OUTPATIENT
Start: 2023-08-01 | End: 2023-08-01

## 2023-08-01 RX ORDER — LIDOCAINE HYDROCHLORIDE 10 MG/ML
2 INJECTION, SOLUTION INFILTRATION; PERINEURAL ONCE
Status: COMPLETED | OUTPATIENT
Start: 2023-08-01 | End: 2023-08-01

## 2023-08-01 RX ORDER — BETAMETHASONE SODIUM PHOSPHATE AND BETAMETHASONE ACETATE 3; 3 MG/ML; MG/ML
24 INJECTION, SUSPENSION INTRA-ARTICULAR; INTRALESIONAL; INTRAMUSCULAR; SOFT TISSUE ONCE
Status: COMPLETED | OUTPATIENT
Start: 2023-08-01 | End: 2023-08-01

## 2023-08-01 RX ADMIN — LIDOCAINE HYDROCHLORIDE 2 ML: 10 INJECTION, SOLUTION INFILTRATION; PERINEURAL at 10:40

## 2023-08-01 RX ADMIN — BUPIVACAINE HYDROCHLORIDE 10 MG: 2.5 INJECTION, SOLUTION INFILTRATION; PERINEURAL at 10:40

## 2023-08-01 RX ADMIN — BETAMETHASONE SODIUM PHOSPHATE AND BETAMETHASONE ACETATE 24 MG: 3; 3 INJECTION, SUSPENSION INTRA-ARTICULAR; INTRALESIONAL; INTRAMUSCULAR; SOFT TISSUE at 10:39

## 2023-08-01 NOTE — PROGRESS NOTES
Chief Complaint  Follow-up (Kvng Knee Pain  )      Follow-up recurrent right much greater than left knee pain with known history of mild to moderate osteoarthritis and chondromalacia patella with acute worsening right knee pain 13 2022    Carmen Boles is a 78 y.o. female is a very pleasant white female very nice patient of  Dr. Joe Roberts who is a reasonably well-controlled diabetic with her last hemoglobin A1c at 6.1 who has a history of renal disease and neuropathy as well who is being seen today for follow-up on her bilateral knee osteoarthritis with patellofemoral arthropathy. We had seen her last in the office on 11/23/2021 will be completed Orthovisc to her right knee. History of Present Illness for Follow Up Patient:      Elton Trivedi is being seen in follow up today for chronic left knee pain. The patient has completed Visco supplementation with Orthovisc to her right knee on 12/13/2022 this did help her substantially. She has noticed increasing soreness of the leg but there is no interim history of injury or no activity. She was doing very well with regard to her knees bilaterally until in roughly mid July 2023 she began noticed increasing soreness to the right much greater than left knee. There was no history of injury no activity prior to becoming symptomatic and admits she has been a little bit lax in performing her home-based exercise program.  Most of her pain is anterior but also somewhat medial in nature. She has been using her Voltaren gel and admits she could be better about work on her exercises. Denies locking catching or true instability symptoms. She does believe she got substantial benefit with viscosupplementation over last couple of years and would like to avoid surgical intervention if at all possible. She is having pain positional changes and stair climbing and does feel a little bit tight but denies locking catching or true instability symptoms.             Attest: I have reviewed

## 2023-08-04 ENCOUNTER — TELEPHONE (OUTPATIENT)
Dept: ORTHOPEDIC SURGERY | Age: 78
End: 2023-08-04

## 2023-08-04 NOTE — TELEPHONE ENCOUNTER
Spoke to patient and let them know that [address] injections have been approved for their bilateral knees. Patient is going to talk to family about arranging transportation and will get back to me.

## 2023-08-10 ENCOUNTER — OFFICE VISIT (OUTPATIENT)
Dept: ORTHOPEDIC SURGERY | Age: 78
End: 2023-08-10

## 2023-08-10 DIAGNOSIS — G89.29 CHRONIC PAIN OF RIGHT KNEE: ICD-10-CM

## 2023-08-10 DIAGNOSIS — M17.0 PRIMARY OSTEOARTHRITIS OF BOTH KNEES: Primary | ICD-10-CM

## 2023-08-10 DIAGNOSIS — M22.41 CHONDROMALACIA OF BOTH PATELLAE: ICD-10-CM

## 2023-08-10 DIAGNOSIS — M22.42 CHONDROMALACIA OF BOTH PATELLAE: ICD-10-CM

## 2023-08-10 DIAGNOSIS — M25.562 CHRONIC PAIN OF LEFT KNEE: ICD-10-CM

## 2023-08-10 DIAGNOSIS — G89.29 CHRONIC PAIN OF LEFT KNEE: ICD-10-CM

## 2023-08-10 DIAGNOSIS — M25.561 CHRONIC PAIN OF RIGHT KNEE: ICD-10-CM

## 2023-08-10 NOTE — PROGRESS NOTES
Chief Complaint  Knee Pain (FU  LOLIS KNEES)      Follow-up recurrent right much greater than left knee pain with known history of mild to moderate osteoarthritis and chondromalacia patella with acute worsening right knee pain     Quincy Tran is a 70 y.o. female is a very pleasant white female very nice patient of  Dr. Marine Valdez who is a reasonably well-controlled diabetic with her last hemoglobin A1c at 6.1 who has a history of renal disease and neuropathy as well who is being seen today for follow-up on her bilateral knee osteoarthritis with patellofemoral arthropathy. We had seen her last in the office on 11/23/2021 will be completed Orthovisc to her right knee. History of Present Illness for Follow Up Patient:      Romelia Parker is being seen in follow up today for chronic left knee pain. The patient has completed Visco supplementation with Orthovisc to her right knee on 12/13/2022 this did help her substantially. She has noticed increasing soreness of the leg but there is no interim history of injury or no activity. She was doing very well with regard to her knees bilaterally until in roughly mid July 2023 she began noticed increasing soreness to the right much greater than left knee. There was no history of injury no activity prior to becoming symptomatic and admits she has been a little bit lax in performing her home-based exercise program.  Most of her pain is anterior but also somewhat medial in nature. She has been using her Voltaren gel and admits she could be better about work on her exercises. Denies locking catching or true instability symptoms. She does believe she got substantial benefit with viscosupplementation over last couple of years and would like to avoid surgical intervention if at all possible. She is having pain positional changes and stair climbing and does feel a little bit tight but denies locking catching or true instability symptoms.       I saw Romelia Parker in the office on 8/1/2023 for

## 2023-08-24 ENCOUNTER — OFFICE VISIT (OUTPATIENT)
Dept: ORTHOPEDIC SURGERY | Age: 78
End: 2023-08-24

## 2023-08-24 DIAGNOSIS — M22.42 CHONDROMALACIA OF BOTH PATELLAE: ICD-10-CM

## 2023-08-24 DIAGNOSIS — M22.41 CHONDROMALACIA OF BOTH PATELLAE: ICD-10-CM

## 2023-08-24 DIAGNOSIS — G89.29 CHRONIC PAIN OF LEFT KNEE: ICD-10-CM

## 2023-08-24 DIAGNOSIS — G89.29 CHRONIC PAIN OF RIGHT KNEE: ICD-10-CM

## 2023-08-24 DIAGNOSIS — M17.0 PRIMARY OSTEOARTHRITIS OF BOTH KNEES: Primary | ICD-10-CM

## 2023-08-24 DIAGNOSIS — M25.562 CHRONIC PAIN OF LEFT KNEE: ICD-10-CM

## 2023-08-24 DIAGNOSIS — M25.561 CHRONIC PAIN OF RIGHT KNEE: ICD-10-CM

## 2023-08-24 NOTE — PROGRESS NOTES
second Orthovisc injection stating that her shoulder knee pain is doing much better. She is continue with her Voltaren gel denies locking catching or true instability symptoms. Her last round of viscosupplementation on the right was completed on 11/23/2021. Pain positional changes and walking. No substantial rest or night pain currently. PE: no substantial change in exam.    Assessment:  Knee Osteoarthritis with viscosupplementation      PROCEDURE NOTE:    PRE-PROCEDURE DIAGNOSIS: DJD knee    POST-PROCEDURE DIAGNOSIS: DJD knee    Injection #2 Orthovisc bilaterally     PROCEDURE:  With the patient's permission, her bilaterally knee was prepped in standard sterile fashion with Betadine and Alcohol and the prefilled 2cc injection of Orthovisc was injected intra-articularly into the bilaterally knee via a superolateral approach without difficulty. The patient tolerated this well without difficulty. A band-aid was applied. POST-PROCEDURE INSTRUCTIONS GIVEN TO PATIENT: The patient was advised to ice the knee for 15-20 minutes to relieve any injection site related pain. FOLLOW-UP: as directed. She will continue with her Voltaren gel 4 g 4 times daily as well as her exercise program and episodic brace usage. We will see her back next week to finish up her Orthovisc series. She is typically once again done well with this on her right knee but this is the first time trying this on the left knee.   She is also experiencing some left gluteal and lateral hip discomfort but will try using her Voltaren gel and getting back into her back stretching program and we can evaluate this next week if remains problematic for her

## 2023-08-31 ENCOUNTER — OFFICE VISIT (OUTPATIENT)
Dept: ORTHOPEDIC SURGERY | Age: 78
End: 2023-08-31

## 2023-08-31 DIAGNOSIS — M22.41 CHONDROMALACIA OF BOTH PATELLAE: ICD-10-CM

## 2023-08-31 DIAGNOSIS — M25.561 CHRONIC PAIN OF RIGHT KNEE: ICD-10-CM

## 2023-08-31 DIAGNOSIS — M17.0 PRIMARY OSTEOARTHRITIS OF BOTH KNEES: Primary | ICD-10-CM

## 2023-08-31 DIAGNOSIS — G89.29 CHRONIC PAIN OF RIGHT KNEE: ICD-10-CM

## 2023-08-31 DIAGNOSIS — G89.29 CHRONIC PAIN OF LEFT KNEE: ICD-10-CM

## 2023-08-31 DIAGNOSIS — M22.42 CHONDROMALACIA OF BOTH PATELLAE: ICD-10-CM

## 2023-08-31 DIAGNOSIS — M25.562 CHRONIC PAIN OF LEFT KNEE: ICD-10-CM

## 2023-08-31 NOTE — PROGRESS NOTES
CC:  FU Knee Osteoarthritis with Viscosupplementation      Almaz Nation is a 78 y.o. female is a very pleasant white female very nice patient of  Dr. Ella Denson who is a reasonably well-controlled diabetic with her last hemoglobin A1c at 6.1 who has a history of renal disease and neuropathy as well who is being seen today for follow-up on her bilateral knee osteoarthritis with patellofemoral arthropathy. We had seen her last in the office on 11/23/2021 will be completed Orthovisc to her right knee. History of Present Illness for Follow Up Patient:      Sera Gallagher is being seen in follow up today for chronic left knee pain. The patient has completed Visco supplementation with Orthovisc to her right knee on 11/23/2021 this did help her substantially. She has noticed increasing soreness of the leg but there is no interim history of injury or no activity. She was doing very well with regard to her knees bilaterally until rather acutely on 10/23/2022 she began noticed increasing soreness to the right much greater than left knee. There is no history of injury fall or trauma although she did do quite a bit of extra walking while she moved early in October 2022. She states she woke up on 10/23/2024 with soreness to the knee which progressively worsened to the point where she had a definite difficult time bearing weight with swelling and motion loss with both posterior and some medial knee pain on the right. They did go to urgent care and she requested a steroid injection which they declined and placed her to Medrol pack which is helped her substantially which she rates at about 75% at least.  She was initially using a walker. She has not been requiring this recently and is having more achy discomfort on her contralateral left knee. .  She is now having more achy type discomfort and does try to go to the gym and workout 4 to 5 days/week if she can. She has been using her Voltaren gel.   There is no history of injury

## 2024-04-01 RX ORDER — ACETAMINOPHEN/DIPHENHYDRAMINE 500MG-25MG
1 TABLET ORAL NIGHTLY PRN
COMMUNITY

## 2024-04-01 NOTE — PROGRESS NOTES
note these are generalized instructions for all surgical cases, you may be provided with more specific instructions according to your surgery.    C-Difficile admission screening and protocol:       * Admitted with diarrhea?                         [] YES    [x]  NO     *Prior history of C-Diff. In last 3 months? [] YES    [x]  NO     *Antibiotic use in the past 6-8 weeks?      [x]  NO    []  YES                 If yes, which ANTIBIOTIC AND REASON______     *Prior hospitalization or nursing home in the last month? []  YES    [x]  NO        SAFETY FIRST..call before you fall

## 2024-04-12 ENCOUNTER — ANESTHESIA EVENT (OUTPATIENT)
Dept: SURGERY | Age: 79
End: 2024-04-12
Payer: MEDICARE

## 2024-04-12 NOTE — PRE-PROCEDURE INSTRUCTIONS
[address] [facility] [address]  [phone]        Pre-Op Phone Call:     Patient Name: Devora Malone     Telephone Information:   Mobile [phone]     Home phone:  [phone]    Surgery Time:   10:10 AM     Arrival Time:  0840     Left extended Message:  No     Message left with:     Recent change in health status:  No     Advised of transportation/ policy:  Yes     NPO policy reviewed:  Yes     Advised to take morning heart/blood pressure medications with sips of water morning of surgery?  Yes     Instructed to bring eye drops, photo identification, and insurance card day of surgery?  Yes     Advised to wear short sleeved button down shirt (no T-shirt underneath):  Yes     Advised not to wear jewelry, hairpins, or pantyhose day of surgery?  Yes     Advised not to wear make-up and to wash face day of surgery?  Yes    Remarks:        Electronically signed by:  Kathleen Vaughn RN at 4/12/2024 10:11 AM

## 2024-04-15 ENCOUNTER — HOSPITAL ENCOUNTER (OUTPATIENT)
Age: 79
Setting detail: OUTPATIENT SURGERY
Discharge: HOME | End: 2024-04-15
Attending: OPHTHALMOLOGY | Admitting: OPHTHALMOLOGY
Payer: MEDICARE

## 2024-04-15 ENCOUNTER — ANESTHESIA (OUTPATIENT)
Dept: SURGERY | Age: 79
End: 2024-04-15
Payer: MEDICARE

## 2024-04-15 VITALS
RESPIRATION RATE: 13 BRPM | SYSTOLIC BLOOD PRESSURE: 144 MMHG | BODY MASS INDEX: 39.71 KG/M2 | WEIGHT: 253 LBS | OXYGEN SATURATION: 98 % | DIASTOLIC BLOOD PRESSURE: 79 MMHG | TEMPERATURE: 97.8 F | HEIGHT: 67 IN | HEART RATE: 77 BPM

## 2024-04-15 LAB
GLUCOSE BLD-MCNC: 102 MG/DL (ref 70–99)
GLUCOSE BLD-MCNC: 110 MG/DL (ref 70–99)
PERFORMED ON: ABNORMAL
PERFORMED ON: ABNORMAL

## 2024-04-15 PROCEDURE — 2500000003 HC RX 250 WO HCPCS: Performed by: NURSE ANESTHETIST, CERTIFIED REGISTERED

## 2024-04-15 PROCEDURE — 3700000001 HC ADD 15 MINUTES (ANESTHESIA): Performed by: OPHTHALMOLOGY

## 2024-04-15 PROCEDURE — 2500000003 HC RX 250 WO HCPCS: Performed by: OPHTHALMOLOGY

## 2024-04-15 PROCEDURE — 2709999900 HC NON-CHARGEABLE SUPPLY: Performed by: OPHTHALMOLOGY

## 2024-04-15 PROCEDURE — 3700000000 HC ANESTHESIA ATTENDED CARE: Performed by: OPHTHALMOLOGY

## 2024-04-15 PROCEDURE — 3600000002 HC SURGERY LEVEL 2 BASE: Performed by: OPHTHALMOLOGY

## 2024-04-15 PROCEDURE — 6370000000 HC RX 637 (ALT 250 FOR IP): Performed by: OPHTHALMOLOGY

## 2024-04-15 PROCEDURE — 7100000010 HC PHASE II RECOVERY - FIRST 15 MIN: Performed by: OPHTHALMOLOGY

## 2024-04-15 PROCEDURE — 6360000002 HC RX W HCPCS: Performed by: NURSE ANESTHETIST, CERTIFIED REGISTERED

## 2024-04-15 PROCEDURE — 3600000012 HC SURGERY LEVEL 2 ADDTL 15MIN: Performed by: OPHTHALMOLOGY

## 2024-04-15 PROCEDURE — 2580000003 HC RX 258: Performed by: ANESTHESIOLOGY

## 2024-04-15 PROCEDURE — 6360000002 HC RX W HCPCS: Performed by: OPHTHALMOLOGY

## 2024-04-15 PROCEDURE — 7100000011 HC PHASE II RECOVERY - ADDTL 15 MIN: Performed by: OPHTHALMOLOGY

## 2024-04-15 RX ORDER — PROPOFOL 10 MG/ML
INJECTION, EMULSION INTRAVENOUS PRN
Status: DISCONTINUED | OUTPATIENT
Start: 2024-04-15 | End: 2024-04-15 | Stop reason: SDUPTHER

## 2024-04-15 RX ORDER — SODIUM CHLORIDE 0.9 % (FLUSH) 0.9 %
5-40 SYRINGE (ML) INJECTION PRN
Status: DISCONTINUED | OUTPATIENT
Start: 2024-04-15 | End: 2024-04-15 | Stop reason: HOSPADM

## 2024-04-15 RX ORDER — SODIUM CHLORIDE 9 MG/ML
INJECTION, SOLUTION INTRAVENOUS PRN
Status: DISCONTINUED | OUTPATIENT
Start: 2024-04-15 | End: 2024-04-15 | Stop reason: HOSPADM

## 2024-04-15 RX ORDER — TETRACAINE HYDROCHLORIDE 5 MG/ML
SOLUTION OPHTHALMIC
Status: COMPLETED | OUTPATIENT
Start: 2024-04-15 | End: 2024-04-15

## 2024-04-15 RX ORDER — SODIUM CHLORIDE 0.9 % (FLUSH) 0.9 %
5-40 SYRINGE (ML) INJECTION EVERY 12 HOURS SCHEDULED
Status: DISCONTINUED | OUTPATIENT
Start: 2024-04-15 | End: 2024-04-15 | Stop reason: HOSPADM

## 2024-04-15 RX ORDER — LIDOCAINE HYDROCHLORIDE 20 MG/ML
INJECTION, SOLUTION EPIDURAL; INFILTRATION; INTRACAUDAL; PERINEURAL PRN
Status: DISCONTINUED | OUTPATIENT
Start: 2024-04-15 | End: 2024-04-15 | Stop reason: SDUPTHER

## 2024-04-15 RX ORDER — FENTANYL CITRATE 50 UG/ML
INJECTION, SOLUTION INTRAMUSCULAR; INTRAVENOUS PRN
Status: DISCONTINUED | OUTPATIENT
Start: 2024-04-15 | End: 2024-04-15 | Stop reason: SDUPTHER

## 2024-04-15 RX ADMIN — LIDOCAINE HYDROCHLORIDE 60 MG: 20 INJECTION, SOLUTION EPIDURAL; INFILTRATION; INTRACAUDAL; PERINEURAL at 10:37

## 2024-04-15 RX ADMIN — PROPOFOL 20 MG: 10 INJECTION, EMULSION INTRAVENOUS at 10:38

## 2024-04-15 RX ADMIN — SODIUM CHLORIDE: 9 INJECTION, SOLUTION INTRAVENOUS at 09:29

## 2024-04-15 RX ADMIN — PROPOFOL 60 MG: 10 INJECTION, EMULSION INTRAVENOUS at 10:37

## 2024-04-15 RX ADMIN — PROPOFOL 20 MG: 10 INJECTION, EMULSION INTRAVENOUS at 10:39

## 2024-04-15 RX ADMIN — FENTANYL CITRATE 50 MCG: 50 INJECTION INTRAMUSCULAR; INTRAVENOUS at 10:48

## 2024-04-15 RX ADMIN — PROPOFOL 40 MG: 10 INJECTION, EMULSION INTRAVENOUS at 10:48

## 2024-04-15 ASSESSMENT — PAIN - FUNCTIONAL ASSESSMENT
PAIN_FUNCTIONAL_ASSESSMENT: 0-10

## 2024-04-15 NOTE — ANESTHESIA PRE PROCEDURE
Department of Anesthesiology  Preprocedure Note       Name:  Devora Malone   Age:  78 y.o.  :  1945                                          MRN:  [id number]         Date:  4/15/2024      Surgeon: Surgeon(s):  Martha Pepe MD    Procedure: Procedure(s):  BLEPHAROPLASTY - BILATERAL UPPER LIDS    Medications prior to admission:   Prior to Admission medications    Medication Sig Start Date End Date Taking? Authorizing Provider   diphenhydrAMINE-APAP, sleep, (TYLENOL PM EXTRA STRENGTH)  MG tablet Take 1 tablet by mouth nightly as needed for Sleep   Yes ProviderPastor MD   methocarbamol (ROBAXIN) 500 MG tablet Take by mouth 3 times daily 22   Pastor Dodd MD   amLODIPine (NORVASC) 2.5 MG tablet  8/10/21   Pastor Dodd MD   atorvastatin (LIPITOR) 40 MG tablet  21   Pastor Dodd MD   TRUE METRIX BLOOD GLUCOSE TEST strip  21   Pastor Dodd MD   TRUEplus Lancets 30G MISC  21   Pastor Dodd MD   sodium bicarbonate 650 MG tablet TAKE 1 TABLET BY MOUTH DAILY 21   Pastor Dodd MD   fenofibrate (TRIGLIDE) 160 MG tablet Take 1 tablet by mouth daily    Pastor Dodd MD   ferrous sulfate (IRON 325) 325 (65 Fe) MG tablet Take 1 tablet by mouth daily (with breakfast)    Pastor Dodd MD   diphenhydrAMINE HCl, Sleep, (SLEEP AID) 25 MG CAPS Take by mouth  Patient not taking: Reported on 2024    Pastor Dodd MD   topiramate (TOPAMAX) 100 MG tablet Take 1 tablet by mouth 2 times daily  Patient not taking: Reported on 2024    Pastor Dodd MD   aspirin 81 MG EC tablet Take 1 tablet by mouth daily    Pastor Dodd MD   patiromer sorbitex calcium (VELTASSA) 8.4 g PACK packet Take 1 packet by mouth daily    Pastor Dodd MD   vitamin D (ERGOCALCIFEROL) 1.25 MG (59233 UT) CAPS capsule Take 1 capsule by mouth once a week Takes on Friday  Patient not taking: Reported on 4/15/2024

## 2024-04-15 NOTE — DISCHARGE INSTRUCTIONS
Post-Operative Instructions for Ophthalmic Plastic Surgery      ACTIVITY:     Today, rest quietly at home. Sit upright as much as possible and sleep with your head elevated for 1 week. Do not perform strenuous activity for 1 week.  You may take a shower or bath 24 hours after surgery.  It is Ok if the sutures get wet.  A responsible person must accompany you home.  Do not drive for 24 hours.    EYE  CARE:   Place baggies of frozen peas or corn on each operated eye for 20 min on and 20 min off while awake; discontinue at bedtime.  Do this for 2 days.  Apply antibiotic ointment to the sutures twice a day for 2 weeks.  Small amounts of bloody drainage may occur after surgery and will usually stop with firm pressure applied for 5 minutes; use a clean washcloth.  If this does not stop the bleeding, then call immediately.  Some soreness, swelling, bruising, and slightly blurry vision are normal.  If you have decreased vision, excessive swelling or bruising, or bulging forward of the eyeball, then please call immediately.    DIET:    You may resume your regular diet.  No alcohol for 24 hours.  Resume your regular medications.  Refer to surgical packet for instructions on when to restart Coumadin, Plavix and aspirin.    PAIN:   You may take Tylenol (acetaminophen) for pain.  If this does not relieve your pain, then please call.    OTHER:   If you experience nausea, vomiting, or excessive pain, please contact your surgeon immediately.      Please call our main number if you have any questions or problems:  [phone]

## 2024-04-15 NOTE — ANESTHESIA POSTPROCEDURE EVALUATION
Department of Anesthesiology  Postprocedure Note    Patient: Devora Malone  MRN: [id number]  YOB: 1945  Date of evaluation: 4/15/2024    Procedure Summary       Date: 04/15/24 Room / Location: [address]    Anesthesia Start: 1033 Anesthesia Stop: 1103    Procedure: BLEPHAROPLASTY - BILATERAL UPPER LIDS (Bilateral: Eye) Diagnosis:       Dermatochalasis of both upper eyelids      (Dermatochalasis of both upper eyelids [H02.831, H02.834])    Surgeons: Martha Pepe MD Responsible Provider: Tristian Fisher MD    Anesthesia Type: MAC ASA Status: 3            Anesthesia Type: MAC    Angélica Phase I: Angélica Score: 10    Angélica Phase II: Angélica Score: 10    Anesthesia Post Evaluation    Patient location during evaluation: PACU  Patient participation: complete - patient participated  Level of consciousness: awake  Airway patency: patent  Nausea & Vomiting: no nausea and no vomiting  Cardiovascular status: hemodynamically stable and blood pressure returned to baseline  Respiratory status: spontaneous ventilation, nonlabored ventilation and room air  Hydration status: stable  Comments: Uneventful MAC anesthetic. Ms. Malone was seen resting comfortably following her procedure. Appropriate for discharge home with .   Pain management: adequate    No notable events documented.

## 2024-04-15 NOTE — H&P
Date of Surgery Update:  Devora Malone was seen, history and physical examination reviewed, and patient examined by me today. There have been no significant clinical changes since the completion of the previous history and physical. The surgical site was confirmed by the patient and me.     The risk, benefits, and alternatives of the proposed procedure have been explained to the patient (or appropriate guardian) and understanding verbalized. All questions answered. Patient wishes to proceed.    Electronically signed by: Martha Pepe MD,4/15/2024,10:27 AM

## 2024-04-15 NOTE — OP NOTE
Title of Operation:  Bilateral upper lid functional blepharoplasty, CPT code 60005-90  Indications for Surgery:  78 y.o. female who presented visually significant bilateral upper lid dermatochalasis, for which medically necessary blepharoplasty was indicated. After benefits, risks and alternatives were discussed, the patient elected to proceed with surgical repair.   Preoperative Diagnosis:  Bilateral upper lid dermatochalasis  Postoperative Diagnosis:  Bilateral upper lid dermatochalasis  Anesthesia:   Monitored anesthesia care (MAC) and Local.  Specimen (Bacteriological, Pathological or other):  None  Prosthetic Device/Implant:  None  Surgeon:  Martha Pepe MD  Assistant:  LOLIS Ham  Estimated blood loss:  Less than 5mL  Surgeon's Narrative:  The patient was greeted in the preoperative area.  The correct place for surgery was identified and marked.  The patient was taken back to the operating room and placed in supine position.  Time-out for safety was held.  The upper eyelid crease and an ellipse of upper lid skin were measured and marked on each side with a marking pen. Intravenous sedation was administered. A 50:50 mix of 2% lidocaine with 1:100,000 epinephrine and 0.75% marcaine was injected in these areas for local anesthesia. The patient was then prepped and draped in the usual sterile fashion. The right upper lid was addressed first. The skin was incised with a #15 blade. The strip of excess skin was removed with Arturo scissors. Careful hemostasis was achieved with bipolar cautery. The orbital septum was incised, the preaponeurotic fat pads were identified and trimmed as needed, with a clamp/cut/cautery technique. The skin was then closed with a running 6-0 plain gut suture. The exact same procedure was performed in the left upper lid. Antibiotic ointment was applied in the eyes and on the incision sites. The patient tolerated the procedure very well. There were no complications.

## 2024-11-11 SDOH — HEALTH STABILITY: PHYSICAL HEALTH: ON AVERAGE, HOW MANY DAYS PER WEEK DO YOU ENGAGE IN MODERATE TO STRENUOUS EXERCISE (LIKE A BRISK WALK)?: 3 DAYS

## 2024-11-11 SDOH — HEALTH STABILITY: PHYSICAL HEALTH: ON AVERAGE, HOW MANY MINUTES DO YOU ENGAGE IN EXERCISE AT THIS LEVEL?: 120 MIN

## 2024-11-14 ENCOUNTER — OFFICE VISIT (OUTPATIENT)
Dept: ORTHOPEDIC SURGERY | Age: 79
End: 2024-11-14

## 2024-11-14 DIAGNOSIS — Z98.1 STATUS POST LUMBAR SPINAL FUSION: ICD-10-CM

## 2024-11-14 DIAGNOSIS — M25.552 LEFT HIP PAIN: ICD-10-CM

## 2024-11-14 DIAGNOSIS — M70.62 GREATER TROCHANTERIC BURSITIS OF LEFT HIP: ICD-10-CM

## 2024-11-14 DIAGNOSIS — M54.42 LOW BACK PAIN WITH LEFT-SIDED SCIATICA, UNSPECIFIED BACK PAIN LATERALITY, UNSPECIFIED CHRONICITY: Primary | ICD-10-CM

## 2024-11-14 RX ORDER — BETAMETHASONE SODIUM PHOSPHATE AND BETAMETHASONE ACETATE 3; 3 MG/ML; MG/ML
6 INJECTION, SUSPENSION INTRA-ARTICULAR; INTRALESIONAL; INTRAMUSCULAR; SOFT TISSUE ONCE
Status: COMPLETED | OUTPATIENT
Start: 2024-11-14 | End: 2024-11-14

## 2024-11-14 RX ORDER — BUPIVACAINE HYDROCHLORIDE 2.5 MG/ML
1 INJECTION, SOLUTION INFILTRATION; PERINEURAL ONCE
Status: COMPLETED | OUTPATIENT
Start: 2024-11-14 | End: 2024-11-14

## 2024-11-14 RX ORDER — LIDOCAINE HYDROCHLORIDE 10 MG/ML
1 INJECTION, SOLUTION INFILTRATION; PERINEURAL ONCE
Status: COMPLETED | OUTPATIENT
Start: 2024-11-14 | End: 2024-11-14

## 2024-11-14 RX ADMIN — LIDOCAINE HYDROCHLORIDE 1 ML: 10 INJECTION, SOLUTION INFILTRATION; PERINEURAL at 14:14

## 2024-11-14 RX ADMIN — BETAMETHASONE SODIUM PHOSPHATE AND BETAMETHASONE ACETATE 6 MG: 3; 3 INJECTION, SUSPENSION INTRA-ARTICULAR; INTRALESIONAL; INTRAMUSCULAR; SOFT TISSUE at 14:13

## 2024-11-14 RX ADMIN — BUPIVACAINE HYDROCHLORIDE 2.5 MG: 2.5 INJECTION, SOLUTION INFILTRATION; PERINEURAL at 14:14

## 2024-11-14 NOTE — PROGRESS NOTES
Chief Complaint    Hip Pain (OPNP L HIP/BACK )      History of Present Illness:  Devora Malone is a 79 y.o. female is a very pleasant white female very nice patient of  Dr. Martina Gill who is a reasonably well-controlled diabetic with her last hemoglobin A1c at 6.1 who has a history of renal disease and neuropathy as well who is being seen today for follow-up on her bilateral knee osteoarthritis with patellofemoral arthropathy who is being seen today for evaluation of ongoing pain to her left lumbar spine hip and gluteal region.  She does have a significant chronic mechanical back pain history and does have a spinal cord stimulator and is seeing Dr. Loaiza her back in the past and is in pain management with them.  She states that since roughly early to mid October 2024 she began noticing insidious onset of pain to the lateral aspect of her left hip.  There is no history of fall trauma or new activity prior to becoming symptomatic although she admits she did cut back on the frequency of her hip exercises.  She is having some mild groin pain as well as her chronic lower back discomfort and some gluteal pain.  She does not seem to be complaining of high-grade radicular symptoms although she does have an appointment with her pain management physician next month in December 2024.  She is having some mild groin pain and feels occasional catching but this is negligible if she is good about keeping up with her exercises.  She has continued with her chronic gabapentin and Robaxin but does not really utilize her Voltaren gel to her lateral hip.  She does have a new nephrologist after her nephrologist retired this is likely can be progressing towards dialysis down the road.  Denies neurogenic bowel or bladder symptoms.  Most of her pain is laterally about 3-6 out of 10 involving the left hip which will worsen if she lays on this.  She is being seen today for initial evaluation of her hip screening back films and further

## 2024-11-21 ENCOUNTER — TELEPHONE (OUTPATIENT)
Dept: ORTHOPEDIC SURGERY | Age: 79
End: 2024-11-21

## 2024-11-21 NOTE — TELEPHONE ENCOUNTER
General Question     Subject: REQ A CALL BACK/HIP   Patient and /or Facility Request: Devora Malone   Contact Number: [phone]     PATIENT CALLED IN TO SEE IF SHE CAN SPEAK TO SOMEONE IN THE OFFICE ABOUT HER RECENT INJECTION.. HAS A FEW QUESTIONS...    REQ A CALL BACK..    PLEASE ADVISE

## 2024-11-21 NOTE — TELEPHONE ENCOUNTER
Spoke to patient, answered all questions, forwarded Dr. Reddy's office notes to Dr. Loaiza, her pain mgmt physician for when she follows up with him.

## 2024-12-26 ENCOUNTER — OFFICE VISIT (OUTPATIENT)
Dept: ORTHOPEDIC SURGERY | Age: 79
End: 2024-12-26
Payer: MEDICARE

## 2024-12-26 VITALS — HEIGHT: 67 IN | WEIGHT: 253 LBS | BODY MASS INDEX: 39.71 KG/M2

## 2024-12-26 DIAGNOSIS — M25.552 LEFT HIP PAIN: ICD-10-CM

## 2024-12-26 DIAGNOSIS — M70.62 GREATER TROCHANTERIC BURSITIS OF LEFT HIP: ICD-10-CM

## 2024-12-26 DIAGNOSIS — Z98.1 STATUS POST LUMBAR SPINAL FUSION: ICD-10-CM

## 2024-12-26 DIAGNOSIS — M54.42 LOW BACK PAIN WITH LEFT-SIDED SCIATICA, UNSPECIFIED BACK PAIN LATERALITY, UNSPECIFIED CHRONICITY: Primary | ICD-10-CM

## 2024-12-26 PROCEDURE — G8400 PT W/DXA NO RESULTS DOC: HCPCS | Performed by: FAMILY MEDICINE

## 2024-12-26 PROCEDURE — G8417 CALC BMI ABV UP PARAM F/U: HCPCS | Performed by: FAMILY MEDICINE

## 2024-12-26 PROCEDURE — 1159F MED LIST DOCD IN RCRD: CPT | Performed by: FAMILY MEDICINE

## 2024-12-26 PROCEDURE — 1090F PRES/ABSN URINE INCON ASSESS: CPT | Performed by: FAMILY MEDICINE

## 2024-12-26 PROCEDURE — 99213 OFFICE O/P EST LOW 20 MIN: CPT | Performed by: FAMILY MEDICINE

## 2024-12-26 PROCEDURE — 1123F ACP DISCUSS/DSCN MKR DOCD: CPT | Performed by: FAMILY MEDICINE

## 2024-12-26 PROCEDURE — G8427 DOCREV CUR MEDS BY ELIG CLIN: HCPCS | Performed by: FAMILY MEDICINE

## 2024-12-26 PROCEDURE — G8484 FLU IMMUNIZE NO ADMIN: HCPCS | Performed by: FAMILY MEDICINE

## 2024-12-26 PROCEDURE — 1036F TOBACCO NON-USER: CPT | Performed by: FAMILY MEDICINE

## 2024-12-26 NOTE — PROGRESS NOTES
intact  Mental Status: The patient is oriented to time, place and person. The patient's mood and affect are appropriate.  Lymphatic: The lymphatic examination bilaterally reveals all areas to be without enlargement or induration.  Vascular: Examination reveals no swelling or calf tenderness.  Peripheral pulses are palpable and 2+.  Neurological: The patient has good coordination.  There is no weakness or sensory deficit.    Hip Examination    Inspection: There is no high-grade deformities or substantial soft tissue swelling.  No palpable masses.    Palpation: She does have much less prominent tenderness and 1-2 with palpation of the greater trochanteric bursa on the left more so than right side.  She does have lateral gluteal without anterior hip flexor or adductor tenderness.  She does have gluteal tenderness and some tenderness over the SI joints.    Rang of Motion: She is still somewhat tight with the greater IT bands and hamstrings.  She has no high-grade discomfort into the groin and terminal 10 degrees of internal rotation.  She is very tight.    Strength: Reasonable strength 4+ out of 5 with hip flexion and abduction.    Special Tests: No current discomfort into the groin and terminal 10 degrees of extension.  Mild discomfort with hip impingement testing but no evidence of instability.  Her straight leg raising appears to be benign.    Skin: There are no rashes, ulcerations or lesions.  Distal motor sensorivascular appears intact although she does have a history of neuropathy as well as she is on gabapentin.    Gait: Mild antalgia.  She is using a cane.    Reflex symmetrically preserved    Additional Comments:     Additional Examinations:  Contralateral Exam: Examination of the right hip reveals intact skin.  The patient demonstrates full painless range of motion with regards to flexion, abduction, internal and external rotation.  There is not tenderness about the greater trochanter.  There is a negative

## 2025-03-25 ENCOUNTER — TELEPHONE (OUTPATIENT)
Dept: ORTHOPEDIC SURGERY | Age: 80
End: 2025-03-25

## 2025-03-25 NOTE — TELEPHONE ENCOUNTER
General Question     Subject: [facility] PLEASE CALL Pt   Patient and /or Facility Request: Devora Malone   Contact Number: [phone]      [facility], PLEASE CALL Pt BACK. SHE WAS SUPPOSED TO CALL YOU SEVERAL WEEKS AGO.

## 2025-07-21 ENCOUNTER — TELEPHONE (OUTPATIENT)
Dept: ORTHOPEDIC SURGERY | Age: 80
End: 2025-07-21

## 2025-08-14 ENCOUNTER — OFFICE VISIT (OUTPATIENT)
Dept: ORTHOPEDIC SURGERY | Age: 80
End: 2025-08-14

## 2025-08-14 DIAGNOSIS — M22.42 CHONDROMALACIA OF BOTH PATELLAE: Primary | ICD-10-CM

## 2025-08-14 DIAGNOSIS — G89.29 CHRONIC PAIN OF RIGHT KNEE: ICD-10-CM

## 2025-08-14 DIAGNOSIS — M22.41 CHONDROMALACIA OF BOTH PATELLAE: Primary | ICD-10-CM

## 2025-08-14 DIAGNOSIS — M17.0 PRIMARY OSTEOARTHRITIS OF BOTH KNEES: ICD-10-CM

## 2025-08-14 DIAGNOSIS — M25.561 CHRONIC PAIN OF RIGHT KNEE: ICD-10-CM

## 2025-08-14 RX ORDER — BETAMETHASONE SODIUM PHOSPHATE AND BETAMETHASONE ACETATE 3; 3 MG/ML; MG/ML
24 INJECTION, SUSPENSION INTRA-ARTICULAR; INTRALESIONAL; INTRAMUSCULAR; SOFT TISSUE ONCE
Status: COMPLETED | OUTPATIENT
Start: 2025-08-14 | End: 2025-08-14

## 2025-08-14 RX ORDER — BUPIVACAINE HYDROCHLORIDE 2.5 MG/ML
4 INJECTION, SOLUTION INFILTRATION; PERINEURAL ONCE
Status: COMPLETED | OUTPATIENT
Start: 2025-08-14 | End: 2025-08-14

## 2025-08-14 RX ADMIN — BUPIVACAINE HYDROCHLORIDE 10 MG: 2.5 INJECTION, SOLUTION INFILTRATION; PERINEURAL at 11:47

## 2025-08-14 RX ADMIN — BETAMETHASONE SODIUM PHOSPHATE AND BETAMETHASONE ACETATE 24 MG: 3; 3 INJECTION, SUSPENSION INTRA-ARTICULAR; INTRALESIONAL; INTRAMUSCULAR; SOFT TISSUE at 11:47

## 2025-08-14 RX ADMIN — Medication 2 ML: at 11:47

## 2025-08-22 ENCOUNTER — TELEPHONE (OUTPATIENT)
Dept: ORTHOPEDIC SURGERY | Age: 80
End: 2025-08-22

## 2025-09-04 ENCOUNTER — OFFICE VISIT (OUTPATIENT)
Dept: ORTHOPEDIC SURGERY | Age: 80
End: 2025-09-04

## 2025-09-04 DIAGNOSIS — M17.0 PRIMARY OSTEOARTHRITIS OF BOTH KNEES: ICD-10-CM

## 2025-09-04 DIAGNOSIS — M22.42 CHONDROMALACIA OF BOTH PATELLAE: Primary | ICD-10-CM

## 2025-09-04 DIAGNOSIS — M22.41 CHONDROMALACIA OF BOTH PATELLAE: Primary | ICD-10-CM

## 2025-09-04 DIAGNOSIS — G89.29 CHRONIC PAIN OF RIGHT KNEE: ICD-10-CM

## 2025-09-04 DIAGNOSIS — M25.561 CHRONIC PAIN OF RIGHT KNEE: ICD-10-CM

## (undated) DEVICE — 1 ML TUBERCULIN SYRINGE REGULAR TIP: Brand: MONOJECT

## (undated) DEVICE — SYRINGE 30CC LUER LOCK: Brand: CARDINAL HEALTH

## (undated) DEVICE — STOPCOCK IV PRIMING 0.26ML 3 W W/ TWO FEM LUERLOK PRT 1

## (undated) DEVICE — SOLUTION IRRIGATION BAL SALT SOLUTION 15 ML STRL BSS

## (undated) DEVICE — CORD ES L12FT BPLR FRCP

## (undated) DEVICE — SOLUTION IRRIG BSS ST 500ML

## (undated) DEVICE — Device

## (undated) DEVICE — GLOVE SURG SZ 65 CRM LTX FREE POLYISOPRENE POLYMER BEAD ANTI

## (undated) DEVICE — ENT I-LF: Brand: MEDLINE INDUSTRIES, INC.

## (undated) DEVICE — GLOVE SURG SZ 7.5 L11.73IN FNGR THK9.8MIL STRW LTX POLYMER

## (undated) DEVICE — MICROSURGICAL INSTRUMENT "J" SHAPED CANNULA 27GA: Brand: ALCON

## (undated) DEVICE — SOLUTION IV IRRIG 250ML ST LF 0.9% SODIUM 2F7122

## (undated) DEVICE — WIPE INSTR W73XL73CM VISITEC

## (undated) DEVICE — SOLUTION IV IRRIG WATER 500ML POUR BRL ST 2F7113

## (undated) DEVICE — SYSTEM FLD MGMT DIA0.9MM 45DEG ULT BAL VISN ACT INTREPID

## (undated) DEVICE — INTENDED FOR TISSUE SEPARATION, AND OTHER PROCEDURES THAT REQUIRE A SHARP SURGICAL BLADE TO PUNCTURE OR CUT.: Brand: BARD-PARKER ® STAINLESS STEEL BLADES

## (undated) DEVICE — SPONGE GZ W4XL4IN COT 12 PLY TYP VII WVN C FLD DSGN STERILE

## (undated) DEVICE — APPLICATOR,COTTON-TIP,WOOD,3,STRL: Brand: MEDLINE

## (undated) DEVICE — NEEDLE HYPO 30GA L0.5IN BGE POLYPR HUB S STL REG BVL STR

## (undated) DEVICE — COVER LT HNDL BLU PLAS

## (undated) DEVICE — SUTURE ABSORBABLE MONOFILAMENT 6-0 G-1 18 IN PLN GUT 770G